# Patient Record
Sex: MALE | Race: WHITE | NOT HISPANIC OR LATINO | Employment: FULL TIME | URBAN - METROPOLITAN AREA
[De-identification: names, ages, dates, MRNs, and addresses within clinical notes are randomized per-mention and may not be internally consistent; named-entity substitution may affect disease eponyms.]

---

## 2018-02-13 ENCOUNTER — LAB (OUTPATIENT)
Dept: LAB | Facility: HOSPITAL | Age: 38
End: 2018-02-13

## 2018-02-13 DIAGNOSIS — E87.0 TYPE I DIABETES MELLITUS WITH HYPEROSMOLAR COMA (HCC): Primary | ICD-10-CM

## 2018-02-13 DIAGNOSIS — E10.69 TYPE I DIABETES MELLITUS WITH HYPEROSMOLAR COMA (HCC): Primary | ICD-10-CM

## 2018-02-13 DIAGNOSIS — E10.65 TYPE I DIABETES MELLITUS WITH HYPEROSMOLAR COMA (HCC): Primary | ICD-10-CM

## 2018-02-13 PROCEDURE — 36415 COLL VENOUS BLD VENIPUNCTURE: CPT

## 2018-02-13 PROCEDURE — 86341 ISLET CELL ANTIBODY: CPT

## 2018-02-14 LAB — PANC ISLET CELL AB TITR SER: NEGATIVE {TITER}

## 2018-02-15 LAB — GAD65 AB SER-ACNC: <5 U/ML (ref 0–5)

## 2020-12-07 ENCOUNTER — OFFICE VISIT (OUTPATIENT)
Dept: ENDOCRINOLOGY | Facility: CLINIC | Age: 40
End: 2020-12-07

## 2020-12-07 VITALS — WEIGHT: 210 LBS | BODY MASS INDEX: 26.11 KG/M2 | HEIGHT: 75 IN

## 2020-12-07 DIAGNOSIS — E78.00 HYPERCHOLESTEROLEMIA: ICD-10-CM

## 2020-12-07 DIAGNOSIS — E11.9 TYPE 2 DIABETES MELLITUS WITHOUT COMPLICATION, WITHOUT LONG-TERM CURRENT USE OF INSULIN (HCC): Primary | ICD-10-CM

## 2020-12-07 PROCEDURE — 99442 PR PHYS/QHP TELEPHONE EVALUATION 11-20 MIN: CPT | Performed by: PHYSICIAN ASSISTANT

## 2020-12-07 RX ORDER — METFORMIN HYDROCHLORIDE 500 MG/1
1000 TABLET, EXTENDED RELEASE ORAL DAILY
Qty: 60 TABLET | Refills: 5 | Status: SHIPPED | OUTPATIENT
Start: 2020-12-07 | End: 2020-12-29

## 2020-12-07 NOTE — PROGRESS NOTES
"You have chosen to receive care through a telephone visit. Do you consent to use a telephone visit for your medical care today?   Yes         Office Note      Date: 2020  Patient Name: David Sanchez  MRN: [id number]  : 1980    Chief Complaint   Patient presents with   • Follow-up   • Diabetes       History of Present Illness:   David Sanchez is a 40 y.o. male who presents today for type 2 diabetes.  He remains on metformin ER.  He is testing FSBS several times per week.  Most readings are around 130 or less.  He admits that his diet has not been good.  He asks about Ozempic.  [place]  He denies any problems with his feet.  Eye exam up-to-date.    Subjective      Review of Systems:   Review of Systems   Constitutional: Negative for appetite change, chills, fatigue, fever and unexpected weight change.   Respiratory: Negative for cough, shortness of breath and wheezing.    Cardiovascular: Negative for chest pain, palpitations and leg swelling.   Gastrointestinal: Negative for abdominal pain, constipation, diarrhea, nausea and vomiting.   Endocrine: Negative for cold intolerance, heat intolerance, polydipsia, polyphagia and polyuria.   Neurological: Negative for tremors, syncope, weakness, numbness and headaches.       The following portions of the patient's history were reviewed and updated as appropriate: allergies, current medications, past family history, past medical history, past social history, past surgical history and problem list.    Objective     Vitals:    20 1338   Weight: 95.3 kg (210 lb)   Height: 190.5 cm (75\")     Body mass index is 26.25 kg/m².    Physical Exam    Current Outpatient Medications   Medication Instructions   • glucose blood test strip Use to test blood sugar twice daily   • Lancets (ONETOUCH DELICA PLUS [id number]) misc Use to test blood sugar twice daily   • metFORMIN ER (GLUCOPHAGE-XR) 1,000 mg, " Oral, Daily       Assessment / Plan      Assessment & Plan:  1. Type 2 diabetes mellitus without complication, without long-term current use of insulin (CMS/[facility])  FSBS okay.  Discussed treatment options.  Continue metformin.  Encouraged to work on healthy dietary choices and regular exercise.  Lab order mailed for A1c.  - Hemoglobin A1c; Future      11 minutes spent on phone with patient.    Return in about 4 months (around 4/7/2021).     QI Ward  12/07/2020

## 2020-12-21 ENCOUNTER — TELEPHONE (OUTPATIENT)
Dept: ENDOCRINOLOGY | Facility: CLINIC | Age: 40
End: 2020-12-21

## 2020-12-29 ENCOUNTER — TELEPHONE (OUTPATIENT)
Dept: ENDOCRINOLOGY | Facility: CLINIC | Age: 40
End: 2020-12-29

## 2020-12-29 DIAGNOSIS — E11.65 TYPE 2 DIABETES MELLITUS WITH HYPERGLYCEMIA, WITHOUT LONG-TERM CURRENT USE OF INSULIN (HCC): Primary | ICD-10-CM

## 2020-12-29 RX ORDER — METFORMIN HYDROCHLORIDE 500 MG/1
2000 TABLET, EXTENDED RELEASE ORAL DAILY
Qty: 120 TABLET | Refills: 3 | Status: SHIPPED | OUTPATIENT
Start: 2020-12-29 | End: 2021-02-23 | Stop reason: SDUPTHER

## 2020-12-29 NOTE — TELEPHONE ENCOUNTER
I received copy of his A1c from last week - 7.9%.  I recommend increasing metformin ER to 4 pills/day (this is the most effective dose) in addition to working on his diet.  I sent in new Rx to his pharmacy.  Please let him know.  Thank you.

## 2021-02-12 ENCOUNTER — IMMUNIZATION (OUTPATIENT)
Dept: VACCINE CLINIC | Facility: HOSPITAL | Age: 41
End: 2021-02-12

## 2021-02-12 PROCEDURE — 0001A: CPT | Performed by: INTERNAL MEDICINE

## 2021-02-12 PROCEDURE — 91300 HC SARSCOV02 VAC 30MCG/0.3ML IM: CPT | Performed by: INTERNAL MEDICINE

## 2021-02-23 DIAGNOSIS — E11.65 TYPE 2 DIABETES MELLITUS WITH HYPERGLYCEMIA, WITHOUT LONG-TERM CURRENT USE OF INSULIN (HCC): ICD-10-CM

## 2021-02-24 RX ORDER — METFORMIN HYDROCHLORIDE 500 MG/1
2000 TABLET, EXTENDED RELEASE ORAL DAILY
Qty: 120 TABLET | Refills: 3 | Status: SHIPPED | OUTPATIENT
Start: 2021-02-24 | End: 2021-07-09 | Stop reason: SDUPTHER

## 2021-03-05 ENCOUNTER — IMMUNIZATION (OUTPATIENT)
Dept: VACCINE CLINIC | Facility: HOSPITAL | Age: 41
End: 2021-03-05

## 2021-03-05 ENCOUNTER — APPOINTMENT (OUTPATIENT)
Dept: VACCINE CLINIC | Facility: HOSPITAL | Age: 41
End: 2021-03-05

## 2021-03-05 PROCEDURE — 0002A: CPT | Performed by: INTERNAL MEDICINE

## 2021-03-05 PROCEDURE — 91300 HC SARSCOV02 VAC 30MCG/0.3ML IM: CPT | Performed by: INTERNAL MEDICINE

## 2021-06-08 ENCOUNTER — TELEPHONE (OUTPATIENT)
Dept: ENDOCRINOLOGY | Facility: CLINIC | Age: 41
End: 2021-06-08

## 2021-06-08 DIAGNOSIS — E11.65 TYPE 2 DIABETES MELLITUS WITH HYPERGLYCEMIA, WITHOUT LONG-TERM CURRENT USE OF INSULIN (HCC): Primary | ICD-10-CM

## 2021-06-08 DIAGNOSIS — E78.00 HYPERCHOLESTEROLEMIA: ICD-10-CM

## 2021-06-08 NOTE — TELEPHONE ENCOUNTER
Is he coming here for labs, going to a [facility] [place], operated by [facility] lab, or does he need them mailed to him?

## 2021-06-08 NOTE — TELEPHONE ENCOUNTER
PATIENT WANTS ORDERS IN CHART FOR ALL BLOOD WORK NOT JUST A1C. HE WANTS TO HAVE THEM DRAWN BEFORE NEXT APPOINTMENT.

## 2021-07-09 DIAGNOSIS — E11.65 TYPE 2 DIABETES MELLITUS WITH HYPERGLYCEMIA, WITHOUT LONG-TERM CURRENT USE OF INSULIN (HCC): ICD-10-CM

## 2021-07-09 RX ORDER — METFORMIN HYDROCHLORIDE 500 MG/1
2000 TABLET, EXTENDED RELEASE ORAL DAILY
Qty: 120 TABLET | Refills: 5 | Status: SHIPPED | OUTPATIENT
Start: 2021-07-09 | End: 2021-09-10 | Stop reason: SDUPTHER

## 2021-07-09 RX ORDER — LANCETS 33 GAUGE
EACH MISCELLANEOUS 2 TIMES DAILY
Qty: 100 EACH | Refills: 5 | Status: SHIPPED | OUTPATIENT
Start: 2021-07-09

## 2021-08-05 DIAGNOSIS — E78.00 HYPERCHOLESTEROLEMIA: ICD-10-CM

## 2021-08-05 DIAGNOSIS — E11.65 TYPE 2 DIABETES MELLITUS WITH HYPERGLYCEMIA, WITHOUT LONG-TERM CURRENT USE OF INSULIN (HCC): ICD-10-CM

## 2021-09-10 ENCOUNTER — OFFICE VISIT (OUTPATIENT)
Dept: ENDOCRINOLOGY | Facility: CLINIC | Age: 41
End: 2021-09-10

## 2021-09-10 VITALS
HEIGHT: 75 IN | BODY MASS INDEX: 25.61 KG/M2 | DIASTOLIC BLOOD PRESSURE: 80 MMHG | OXYGEN SATURATION: 98 % | HEART RATE: 77 BPM | SYSTOLIC BLOOD PRESSURE: 120 MMHG | WEIGHT: 206 LBS

## 2021-09-10 DIAGNOSIS — E11.9 TYPE 2 DIABETES MELLITUS WITHOUT COMPLICATION, WITHOUT LONG-TERM CURRENT USE OF INSULIN (HCC): Primary | Chronic | ICD-10-CM

## 2021-09-10 DIAGNOSIS — E78.00 HYPERCHOLESTEROLEMIA: Chronic | ICD-10-CM

## 2021-09-10 PROCEDURE — 99214 OFFICE O/P EST MOD 30 MIN: CPT | Performed by: PHYSICIAN ASSISTANT

## 2021-09-10 RX ORDER — BLOOD SUGAR DIAGNOSTIC
STRIP MISCELLANEOUS
Qty: 100 EACH | Refills: 3 | Status: SHIPPED | OUTPATIENT
Start: 2021-09-10

## 2021-09-10 RX ORDER — METFORMIN HYDROCHLORIDE 500 MG/1
2000 TABLET, EXTENDED RELEASE ORAL DAILY
Qty: 180 TABLET | Refills: 1 | Status: SHIPPED | OUTPATIENT
Start: 2021-09-10 | End: 2021-12-17 | Stop reason: SDUPTHER

## 2021-09-10 RX ORDER — ROSUVASTATIN CALCIUM 10 MG/1
10 TABLET, COATED ORAL DAILY
Qty: 90 TABLET | Refills: 1 | Status: SHIPPED | OUTPATIENT
Start: 2021-09-10 | End: 2021-12-17

## 2021-09-10 NOTE — PROGRESS NOTES
"     Office Note      Date: 09/10/2021  Patient Name: David Sanchez  MRN: [id number]  : 1980    Chief Complaint   Patient presents with   • Diabetes       History of Present Illness:   David Sanchez is a 40 y.o. male who presents today for follow up on type 2 diabetes, diagnosed in 2017.  Known diabetic complications: none.  Current diabetic medications include metformin.  He does miss pills some days.  Past diabetic medications include none.  Current monitoring regimen: none recently.  Home blood sugar records: n/a  Any episodes of hypoglycemia? no  Feet: No sores or other issues.   Eye exam current (within one year): yes, 2020. No retinopathy with macular edema.  Current diet: Eating fast food regularly, diet low in vegetables and fruit.  Current exercise: active at work, no dedicated exercise otherwise.  ACE inhibitor/ARB: No.  Statin: No.  He reports taking a atorvastatin in the past.  He stopped taking this, but cannot remember why.  He thinks that he was having a side effect though.    Subjective      Review of Systems:   Review of Systems   Constitutional: Negative.    Cardiovascular: Negative.    Gastrointestinal: Negative.    Endocrine: Negative.        The following portions of the patient's history were reviewed and updated as appropriate: allergies, current medications, past family history, past medical history, past social history, past surgical history and problem list.    Objective     Vitals:    09/10/21 1032   BP: 120/80   Pulse: 77   SpO2: 98%   Weight: 93.4 kg (206 lb)   Height: 190.5 cm (75\")   PainSc: 0-No pain     Body mass index is 25.75 kg/m².    Physical Exam  Vitals reviewed.   Constitutional:       General: He is not in acute distress.  Cardiovascular:      Pulses:           Dorsalis pedis pulses are 2+ on the right side and 2+ on the left side.        Posterior tibial pulses are 2+ on the right side and 2+ on the left side.   Musculoskeletal:      Right foot: No deformity.    "   Left foot: No deformity.   Feet:      Right foot:      Protective Sensation: 10 sites tested. 10 sites sensed.      Skin integrity: Skin integrity normal.      Toenail Condition: Right toenails are normal.      Left foot:      Protective Sensation: 10 sites tested. 10 sites sensed.      Skin integrity: Skin integrity normal.      Toenail Condition: Left toenails are normal.      Comments:   Neurological:      Mental Status: He is alert and oriented to person, place, and time.   Psychiatric:         Mood and Affect: Affect normal.       Labs on 7/29/2021  A1c 6.6%  Urine albumin/creatinine ratio 6  CMP - glucose 130, creatinine 0.97, eGFR 97, potassium 5.0, AST 35, ALT 69  Lipids - , , HDL 45, triglycerides 112  TSH 2.79    Current Outpatient Medications   Medication Instructions   • Lancets (OneTouch Delica Plus [id number]) misc Use to test blood sugar twice daily   • metFORMIN ER (GLUCOPHAGE-XR) 2,000 mg, Oral, Daily   • OneTouch Verio test strip Use to test blood sugar once daily   • rosuvastatin (CRESTOR) 10 mg, Oral, Daily       Assessment / Plan      Assessment & Plan:  1. Type 2 diabetes mellitus without complication, without long-term current use of insulin (CMS/[facility])  A1c at goal.  His diet is unhealthy.  Encouraged to limit fast food, increase vegetables and fruits, whole grains, lean protein.  ALT is mildly elevated.  He is often drinking 5-6 light beers on Saturdays.  He does not typically drink alcohol on any other days.  Encouraged to reduce to 2 drinks or less.  - metFORMIN ER (GLUCOPHAGE-XR) 500 MG 24 hr tablet; Take 4 tablets by mouth Daily.  Dispense: 180 tablet; Refill: 1  - Lipid Panel; Future  - Comprehensive Metabolic Panel; Future  - Hemoglobin A1c; Future    2. Hypercholesterolemia  Encouraged nutritious dietary choices and regular exercise.  Add rosuvastatin.  - rosuvastatin (Crestor) 10 MG tablet; Take 1 tablet by mouth Daily.  Dispense: 90 tablet; Refill: 1  - Lipid Panel;  Future    Encouraged to call with any questions or concerns before next visit.  Return in about 3 months (around 12/10/2021) for next scheduled follow up. He will have A1c, lipids, CMP prior to visit.  Lab order provided today.    QI Ward  Endocrinology  09/10/2021

## 2021-09-15 ENCOUNTER — TELEPHONE (OUTPATIENT)
Dept: ENDOCRINOLOGY | Facility: CLINIC | Age: 41
End: 2021-09-15

## 2021-09-15 NOTE — TELEPHONE ENCOUNTER
PT CALLED REQUESTING A CONSULT ON HIS MEDICATION HE STARTED ON Friday. HE STATED HIS NECK HAS BEEN STIFF SENSE HE STARTED IT. PLEASE REACH OUT TO PT AT EARLIEST CONVENIENCE. THANK YOU

## 2021-09-15 NOTE — TELEPHONE ENCOUNTER
It is not likely related, but have him stop the rosuvastatin.  He can try restarting again in about 2 weeks.  If symptoms recur, then stop and let me know.  Thank you.

## 2021-12-02 ENCOUNTER — TELEPHONE (OUTPATIENT)
Dept: ENDOCRINOLOGY | Facility: CLINIC | Age: 41
End: 2021-12-02

## 2021-12-02 NOTE — TELEPHONE ENCOUNTER
Pt called stated his blood pressure has been running anywhere from 130/100 to 155/100. Pt wanted to know if Tammy would call in Blood pressure medicine or if pt needs to wait until his appt on 12/17/21. Please notify pt

## 2021-12-03 NOTE — TELEPHONE ENCOUNTER
Spoke with patient.  BP this morning 130/90.  More often 130-150/100.  He reports resting heart rate upper 80s-120.  He reports that his resting heart rate has been in the 90s/low 100s for many years.  He reports being on blood pressure a few years back, but this was stopped and blood pressure remained okay.  He does not know the name of the medication.  He is not established with a PCP at this time.  Started paying attention to elevated resting heart rate after getting an apple watch about a month ago.  He is asymptomatic.  He denies any new medications.  No recent illness.  He reports that he vapes occasionally, no other nicotine use.  Encouraged him to significantly reduce fast food and decrease sodium in diet.  He has an appointment in 2 weeks.  He has lab orders to be done prior to that visit and may have this done on Saturday.  He will continue to monitor BP and call with readings early next week.

## 2021-12-17 ENCOUNTER — OFFICE VISIT (OUTPATIENT)
Dept: ENDOCRINOLOGY | Facility: CLINIC | Age: 41
End: 2021-12-17

## 2021-12-17 VITALS
DIASTOLIC BLOOD PRESSURE: 79 MMHG | BODY MASS INDEX: 26.11 KG/M2 | HEIGHT: 75 IN | HEART RATE: 95 BPM | WEIGHT: 210 LBS | OXYGEN SATURATION: 97 % | SYSTOLIC BLOOD PRESSURE: 118 MMHG

## 2021-12-17 DIAGNOSIS — E11.65 TYPE 2 DIABETES MELLITUS WITH HYPERGLYCEMIA, WITHOUT LONG-TERM CURRENT USE OF INSULIN: Primary | Chronic | ICD-10-CM

## 2021-12-17 DIAGNOSIS — E78.00 HYPERCHOLESTEROLEMIA: Chronic | ICD-10-CM

## 2021-12-17 DIAGNOSIS — R03.0 ELEVATED BLOOD PRESSURE READING WITHOUT DIAGNOSIS OF HYPERTENSION: ICD-10-CM

## 2021-12-17 PROBLEM — E11.9 TYPE 2 DIABETES MELLITUS (HCC): Chronic | Status: ACTIVE | Noted: 2017-01-01

## 2021-12-17 LAB
EXPIRATION DATE: ABNORMAL
GLUCOSE BLDC GLUCOMTR-MCNC: 161 MG/DL (ref 70–130)
Lab: ABNORMAL

## 2021-12-17 PROCEDURE — 99214 OFFICE O/P EST MOD 30 MIN: CPT | Performed by: PHYSICIAN ASSISTANT

## 2021-12-17 PROCEDURE — 82947 ASSAY GLUCOSE BLOOD QUANT: CPT | Performed by: PHYSICIAN ASSISTANT

## 2021-12-17 RX ORDER — METFORMIN HYDROCHLORIDE 500 MG/1
2000 TABLET, EXTENDED RELEASE ORAL DAILY
Qty: 180 TABLET | Refills: 2 | Status: SHIPPED | OUTPATIENT
Start: 2021-12-17 | End: 2022-03-18 | Stop reason: SDUPTHER

## 2021-12-17 RX ORDER — IBUPROFEN 800 MG/1
800 TABLET ORAL EVERY 6 HOURS PRN
Qty: 20 TABLET | Refills: 0 | Status: CANCELLED | OUTPATIENT
Start: 2021-12-17

## 2022-01-03 DIAGNOSIS — E11.9 TYPE 2 DIABETES MELLITUS WITHOUT COMPLICATION, WITHOUT LONG-TERM CURRENT USE OF INSULIN: Chronic | ICD-10-CM

## 2022-01-03 DIAGNOSIS — E78.00 HYPERCHOLESTEROLEMIA: Chronic | ICD-10-CM

## 2022-01-14 RX ORDER — IBUPROFEN 800 MG/1
800 TABLET ORAL EVERY 6 HOURS PRN
Qty: 20 TABLET | Refills: 0 | Status: SHIPPED | OUTPATIENT
Start: 2022-01-14 | End: 2022-03-18 | Stop reason: SDUPTHER

## 2022-01-14 NOTE — TELEPHONE ENCOUNTER
Spoke with patient.  States he thought you had filled it in the past.  States he takes it for his back and arms every once in a while.  Asking if you would refill.

## 2022-01-17 ENCOUNTER — IMMUNIZATION (OUTPATIENT)
Dept: VACCINE CLINIC | Facility: HOSPITAL | Age: 42
End: 2022-01-17

## 2022-01-17 PROCEDURE — 0004A HC ADM SARSCOV2 30MCG/0.3ML BOOSTER: CPT | Performed by: INTERNAL MEDICINE

## 2022-01-17 PROCEDURE — 91300 HC SARSCOV02 VAC 30MCG/0.3ML IM: CPT | Performed by: INTERNAL MEDICINE

## 2022-03-18 ENCOUNTER — OFFICE VISIT (OUTPATIENT)
Dept: ENDOCRINOLOGY | Facility: CLINIC | Age: 42
End: 2022-03-18

## 2022-03-18 ENCOUNTER — SPECIALTY PHARMACY (OUTPATIENT)
Dept: PHARMACY | Facility: HOSPITAL | Age: 42
End: 2022-03-18

## 2022-03-18 VITALS
WEIGHT: 206 LBS | SYSTOLIC BLOOD PRESSURE: 141 MMHG | DIASTOLIC BLOOD PRESSURE: 89 MMHG | BODY MASS INDEX: 25.61 KG/M2 | OXYGEN SATURATION: 96 % | TEMPERATURE: 98.4 F | HEART RATE: 96 BPM | HEIGHT: 75 IN

## 2022-03-18 DIAGNOSIS — E11.65 TYPE 2 DIABETES MELLITUS WITH HYPERGLYCEMIA, WITHOUT LONG-TERM CURRENT USE OF INSULIN: Chronic | ICD-10-CM

## 2022-03-18 DIAGNOSIS — E11.65 TYPE 2 DIABETES MELLITUS WITH HYPERGLYCEMIA, WITHOUT LONG-TERM CURRENT USE OF INSULIN: Primary | Chronic | ICD-10-CM

## 2022-03-18 LAB
EXPIRATION DATE: ABNORMAL
GLUCOSE BLDC GLUCOMTR-MCNC: 135 MG/DL (ref 70–130)
HBA1C MFR BLD: 7.1 %
Lab: ABNORMAL

## 2022-03-18 PROCEDURE — 82962 GLUCOSE BLOOD TEST: CPT | Performed by: NURSE PRACTITIONER

## 2022-03-18 PROCEDURE — 99213 OFFICE O/P EST LOW 20 MIN: CPT | Performed by: NURSE PRACTITIONER

## 2022-03-18 PROCEDURE — 83036 HEMOGLOBIN GLYCOSYLATED A1C: CPT | Performed by: NURSE PRACTITIONER

## 2022-03-18 RX ORDER — IBUPROFEN 800 MG/1
800 TABLET ORAL EVERY 8 HOURS PRN
Qty: 20 TABLET | Refills: 1 | Status: SHIPPED | OUTPATIENT
Start: 2022-03-18 | End: 2022-12-02 | Stop reason: SDUPTHER

## 2022-03-18 RX ORDER — METFORMIN HYDROCHLORIDE 500 MG/1
1000 TABLET, EXTENDED RELEASE ORAL 2 TIMES DAILY
Qty: 120 TABLET | Refills: 5 | Status: SHIPPED | OUTPATIENT
Start: 2022-03-18 | End: 2022-09-22 | Stop reason: SDUPTHER

## 2022-03-18 NOTE — PROGRESS NOTES
Specialty Pharmacy Patient Management Program  Endocrinology Initial Assessment       David Sanchez is a 41 y.o. male with Type 2 Diabetes seen by an Endocrinology provider and enrolled in the Endocrinology Patient Management program offered by [facility].  An initial outreach was conducted, including assessment of therapy appropriateness and specialty medication education for Metformin. The patient was introduced to services offered by [facility], including: regular assessments, refill coordination, curbside pick-up or mail order delivery options, prior authorization maintenance, and financial assistance programs as applicable. The patient was also provided with contact information for the pharmacy team.     Patient is taking metformin 500 mg ER 2,000 mg daily to control BG. He checks his sugar occasionally. He reports no low BG <70. Metformin is the only medication he has tried. He reports occasional stomach upset, but nothing that is affecting QOL.     Insurance Coverage & Financial Support  [facility] employee insurance    Relevant Past Medical History and Comorbidities  Relevant medical history and concomitant health conditions were discussed with the patient. The patient's chart has been reviewed for relevant past medical history and comorbid health conditions and updated as necessary.   Past Medical History:   Diagnosis Date   • Disorder associated with type 2 diabetes mellitus (HCC)    • Hypercholesterolemia    • Type 2 diabetes mellitus (HCC) 2017     Social History     Socioeconomic History   • Marital status:    Tobacco Use   • Smoking status: Former Smoker     Types: Cigarettes     Quit date:      Years since quittin.2   • Smokeless tobacco: Never Used   Vaping Use   • Vaping Use: Former   Substance and Sexual Activity   • Alcohol use: Yes     Comment: occ   • Drug use: Never   • Sexual activity: Defer       Allergies  Known  allergies and reactions were discussed with the patient. The patient's chart has been reviewed for  allergy information and updated as necessary.   Patient has no known allergies.    Current Medication List  This medication list has been reviewed with the patient and evaluated for any interactions or necessary modifications/recommendations, and updated to include all prescription medications, OTC medications, and supplements the patient is currently taking.  This list reflects what is contained in the patient's profile, which has also been marked as reviewed to communicate to other providers it is the most up to date version of the patient's current medication therapy.     Current Outpatient Medications:   •  ibuprofen (ADVIL,MOTRIN) 800 MG tablet, Take 1 tablet by mouth Every 8 (Eight) Hours As Needed (pain)., Disp: 20 tablet, Rfl: 1  •  Lancets (OneTouch Delica Plus [id number]) misc, Use to test blood sugar twice daily, Disp: 100 each, Rfl: 5  •  metFORMIN ER (GLUCOPHAGE-XR) 500 MG 24 hr tablet, Take 2 tablets by mouth 2 (Two) Times a Day for 30 days., Disp: 120 tablet, Rfl: 5  •  OneTouch Verio test strip, Use to test blood sugar once daily (Patient taking differently: Use to test blood sugar once daily), Disp: 100 each, Rfl: 3    Drug Interactions  Coadministration of metformin and NSAIDs may increase the risk of acute renal failure and lactic acidosis - we will continue to monitor kidney function (he only uses IBU PRN)    Recommended Medications Assessment  • Aspirin - not indicated  • Statin -  not taking (he was on before but medication was stopped as PCP thought it was increasing his BG)  • ACEi/ARB - not taking      Relevant Laboratory Values  A1C Last 3 Results    HGBA1C Last 3 Results 3/18/22   Hemoglobin A1C 7.1           Lab Results   Component Value Date    HGBA1C 7.1 03/18/2022     No results found for: GLUCOSE, CALCIUM, NA, K, CO2, CL, BUN, CREATININE, EGFRIFAFRI, EGFRIFNONA, BCR, ANIONGAP  No results  found for: CHOL, CHLPL, TRIG, HDL, LDL, LDLDIRECT      Initial Education Provided for Specialty Medication  None    Adherence and Self-Administration  • Barriers to Patient Adherence and/or Self-Administration: None  • Methods for Supporting Patient Adherence and/or Self-Administration: None required       Vaccination Status:   COVID 19: UTD (boosted)  Influenza: UTD  Pneumococcal: not vaccinated  Hep B: not vaccinated      Goals of Therapy  • Patient Goals of Therapy: Take medication everyday and monitor blood sugar more frequently  • Clinical Goals or Therapeutic Targets, If Applicable: A1C reduction <7%      Reassessment Plan & Follow-Up  1. Pharmacist to perform regular reassessments no more than (6) months from the previous assessment.  2. Welcome information and patient satisfaction survey to be sent by retail team with patient's initial fill.  3. Care Coordinator to set up future refill outreaches, coordinate prescription delivery, and escalate clinical questions to pharmacist.     Additional Plans, Therapy Recommendations or Therapy Problems to Be Addressed: Patient's diabetes is improving with current treatment - A1C: 7.1% at today's visit. No pharmacological recommendations at this time. He needs vaccinations - recommended to patient, reassess at next visit.   Recent Provider Changes:  None    Attestation  I attest that the initiated specialty medication(s) are appropriate for the patient based on my assessment.  If the prescribed therapy is at any point deemed not appropriate based on the current or future assessments, a consultation will be initiated with the patient's specialty care provider to determine the best course of action. The revised plan of therapy will be documented along with any additional patient education provided.       Breanna Ramirez, PharmD  3/18/2022  11:55 EDT

## 2022-03-18 NOTE — PROGRESS NOTES
"Chief Complaint   Patient presents with   • Diabetes     Follow up          HPI   David Sanchez is a 41 y.o. male had concerns including Diabetes (Follow up).      He is checking blood sugars rarely times per day.  Current medications for diabetes include Metformin 1,000 mg BID.  Most recent optho exam: 1 year ago  Place of optho exam: [facility]    The following portions of the patient's history were reviewed and updated as appropriate: allergies, current medications, past family history, past medical history, past social history, past surgical history and problem list.      Review of Systems   Constitutional: Positive for fatigue.   Eyes: Negative.    Endocrine: Negative for polydipsia, polyphagia and polyuria.   Musculoskeletal: Positive for arthralgias.   Psychiatric/Behavioral: Negative for sleep disturbance.   All other systems reviewed and are negative.       Physical Exam  Vitals reviewed.   Constitutional:       Appearance: Normal appearance.   Eyes:      Extraocular Movements: Extraocular movements intact.   Pulmonary:      Effort: Pulmonary effort is normal.   Neurological:      Mental Status: He is alert and oriented to person, place, and time.   Psychiatric:         Mood and Affect: Mood normal.         Behavior: Behavior normal.         Thought Content: Thought content normal.         Judgment: Judgment normal.        /89 (BP Location: Left arm, Patient Position: Sitting, Cuff Size: Adult)   Pulse 96   Temp 98.4 °F (36.9 °C) (Infrared)   Ht 190.5 cm (75\")   Wt 93.4 kg (206 lb)   SpO2 96%   BMI 25.75 kg/m²      Labs and imaging    CMP:     Lipid Panel:  No results found for: CHOL, TRIG, HDL, VLDL, LDL  HbA1c:  Lab Results   Component Value Date    HGBA1C 7.1 03/18/2022     Glucose:    Lab Results   Component Value Date    POCGLU 135 (A) 03/18/2022     Microalbumin:  No results found for: MALBCRERATIO  TSH:  No results found for: TSH    Assessment and plan  Diagnoses and all orders for " this visit:    1. Type 2 diabetes mellitus with hyperglycemia, without long-term current use of insulin (HCC) (Primary)  Assessment & Plan:  Diabetes is improving with treatment.   Continue current treatment regimen.  Diabetes will be reassessed in 3 months.    Orders:  -     POC Glucose Fingerstick  -     POC Glycosylated Hemoglobin (Hb A1C)       Return in about 3 months (around 6/18/2022) for Follow-up appointment, A1C. The patient was instructed to contact the clinic with any interval questions or concerns.    AKIL Byrne   Endocrinology

## 2022-04-13 ENCOUNTER — SPECIALTY PHARMACY (OUTPATIENT)
Dept: PHARMACY | Facility: HOSPITAL | Age: 42
End: 2022-04-13

## 2022-05-06 ENCOUNTER — SPECIALTY PHARMACY (OUTPATIENT)
Dept: PHARMACY | Facility: HOSPITAL | Age: 42
End: 2022-05-06

## 2022-06-14 ENCOUNTER — SPECIALTY PHARMACY (OUTPATIENT)
Dept: PHARMACY | Facility: HOSPITAL | Age: 42
End: 2022-06-14

## 2022-07-06 ENCOUNTER — SPECIALTY PHARMACY (OUTPATIENT)
Dept: PHARMACY | Facility: HOSPITAL | Age: 42
End: 2022-07-06

## 2022-07-08 ENCOUNTER — LAB (OUTPATIENT)
Dept: LAB | Facility: HOSPITAL | Age: 42
End: 2022-07-08

## 2022-07-08 ENCOUNTER — OFFICE VISIT (OUTPATIENT)
Dept: ENDOCRINOLOGY | Facility: CLINIC | Age: 42
End: 2022-07-08

## 2022-07-08 VITALS
OXYGEN SATURATION: 98 % | HEART RATE: 89 BPM | DIASTOLIC BLOOD PRESSURE: 84 MMHG | BODY MASS INDEX: 24.94 KG/M2 | HEIGHT: 75 IN | SYSTOLIC BLOOD PRESSURE: 146 MMHG | WEIGHT: 200.6 LBS

## 2022-07-08 DIAGNOSIS — E11.65 TYPE 2 DIABETES MELLITUS WITH HYPERGLYCEMIA, WITHOUT LONG-TERM CURRENT USE OF INSULIN: Primary | ICD-10-CM

## 2022-07-08 DIAGNOSIS — I10 ESSENTIAL HYPERTENSION: ICD-10-CM

## 2022-07-08 LAB
EXPIRATION DATE: NORMAL
GLUCOSE BLDC GLUCOMTR-MCNC: 160 MG/DL (ref 70–130)
HBA1C MFR BLD: 7.5 %
Lab: NORMAL

## 2022-07-08 PROCEDURE — 83036 HEMOGLOBIN GLYCOSYLATED A1C: CPT | Performed by: NURSE PRACTITIONER

## 2022-07-08 PROCEDURE — 99214 OFFICE O/P EST MOD 30 MIN: CPT | Performed by: NURSE PRACTITIONER

## 2022-07-08 PROCEDURE — 82962 GLUCOSE BLOOD TEST: CPT | Performed by: NURSE PRACTITIONER

## 2022-07-08 RX ORDER — LISINOPRIL 5 MG/1
5 TABLET ORAL DAILY
Qty: 30 TABLET | Refills: 2 | Status: SHIPPED | OUTPATIENT
Start: 2022-07-08 | End: 2022-09-22 | Stop reason: SDUPTHER

## 2022-07-08 RX ORDER — DAPAGLIFLOZIN 5 MG/1
5 TABLET, FILM COATED ORAL DAILY
Qty: 30 TABLET | Refills: 2 | Status: SHIPPED | OUTPATIENT
Start: 2022-07-08 | End: 2022-09-22 | Stop reason: SDUPTHER

## 2022-07-08 NOTE — ASSESSMENT & PLAN NOTE
Hypertension is newly identified.  Start lisinopril 5 mg daily  Blood pressure will be reassessed at the next regular appointment.

## 2022-07-08 NOTE — PROGRESS NOTES
Chief Complaint   Patient presents with   • Diabetes        David Sanchez is a 41 y.o. male had concerns including Diabetes.      He is checking blood sugars rarely.  Current medications for diabetes include Metformin 1,000 mg BID.  Most recent optho exam: is upcoming  Place of optho exam: [facility]  Hypos: none  He has not been following any dietary/exercise guidelines.  He does not plan to change this at this time.    The following portions of the patient's history were reviewed and updated as appropriate: allergies, current medications, past family history, past medical history, past social history, past surgical history and problem list.      Review of Systems   Constitutional: Positive for fatigue.   Eyes: Negative.    Endocrine: Negative for polydipsia, polyphagia and polyuria.   Musculoskeletal: Positive for arthralgias.   Psychiatric/Behavioral: Negative for sleep disturbance.   All other systems reviewed and are negative.       Physical Exam  Vitals reviewed.   Constitutional:       Appearance: Normal appearance.   Eyes:      Extraocular Movements: Extraocular movements intact.   Cardiovascular:      Rate and Rhythm: Normal rate.      Pulses: Normal pulses.           Dorsalis pedis pulses are 2+ on the right side and 2+ on the left side.        Posterior tibial pulses are 2+ on the right side.   Pulmonary:      Effort: Pulmonary effort is normal.   Feet:      Right foot:      Protective Sensation: 10 sites tested. 10 sites sensed.      Skin integrity: Skin integrity normal.      Toenail Condition: Right toenails are normal.      Left foot:      Protective Sensation: 10 sites tested. 10 sites sensed.      Skin integrity: Skin integrity normal.      Toenail Condition: Left toenails are normal.      Comments: Diabetic Foot Exam Performed and Monofilament Test Performed  Neurological:      Mental Status: He is alert and oriented to person, place, and time.   Psychiatric:         Mood and Affect: Mood  "normal.         Behavior: Behavior normal.         Thought Content: Thought content normal.         Judgment: Judgment normal.        /84 (BP Location: Left arm, Patient Position: Sitting, Cuff Size: Adult)   Pulse 89   Ht 190.5 cm (75\")   Wt 91 kg (200 lb 9.6 oz)   SpO2 98%   BMI 25.07 kg/m²      Labs and imaging    CMP:     Lipid Panel:  No results found for: CHOL, TRIG, HDL, VLDL, LDL  HbA1c:  Lab Results   Component Value Date    HGBA1C 7.5 07/08/2022    HGBA1C 7.1 03/18/2022     Glucose:    Lab Results   Component Value Date    POCGLU 160 (A) 07/08/2022     Microalbumin:  No results found for: MALBCRERATIO  TSH:  No results found for: TSH    Assessment and plan  Diagnoses and all orders for this visit:    1. Type 2 diabetes mellitus with hyperglycemia, without long-term current use of insulin (HCC) (Primary)  Assessment & Plan:  - Diabetes is above goal with A1c 7.5.  -Discussed dietary and exercise guidelines.  -Discussed importance of yearly eye exams.  -Discussed importance of maintaining optimal blood sugars.  -Continue metformin 1000 mg twice daily.  -Start Farxiga 5 mg daily. Discussed the medication's MOA and that it may cause more frequent urination particularly upon starting the medication. Take one tablet in the morning with or without food. Encouraged to drink plenty of water as to not get dehydrated especially in warmer weather or with activity. Also discussed there may be an increased incidence of UTIs or yeast infections and to monitor for signs/symptoms.  -Signs and symptoms of hypoglycemia reviewed with rule 15's advised.  -Fasting labs today, will follow as indicated.  -Follow-up in 3 months.      Orders:  -     POC Glucose Fingerstick  -     POC Glycosylated Hemoglobin (Hb A1C)  -     Comprehensive Metabolic Panel  -     Lipid Panel  -     Microalbumin / Creatinine Urine Ratio - Urine, Clean Catch  -     TSH    2. Essential hypertension  Assessment & Plan:  Hypertension is newly " identified.  Start lisinopril 5 mg daily  Blood pressure will be reassessed at the next regular appointment.      Other orders  -     lisinopril (PRINIVIL,ZESTRIL) 5 MG tablet; Take 1 tablet by mouth Daily.  Dispense: 30 tablet; Refill: 2  -     dapagliflozin (Farxiga) 5 MG tablet tablet; Take 1 tablet by mouth Daily.  Dispense: 30 tablet; Refill: 2       Return in about 3 months (around 10/8/2022) for Follow-up appointment, A1C. The patient was instructed to contact the clinic with any interval questions or concerns.    This document has been electronically signed by AKIL Byrne  July 8, 2022 10:12 EDT  Endocrinology    Please note that portions of this document were completed with a voice recognition program. Efforts were made to edit the dictations, but occasionally words are mis-transcribed.

## 2022-07-08 NOTE — ASSESSMENT & PLAN NOTE
- Diabetes is above goal with A1c 7.5.  -Discussed dietary and exercise guidelines.  -Discussed importance of yearly eye exams.  -Discussed importance of maintaining optimal blood sugars.  -Continue metformin 1000 mg twice daily.  -Start Farxiga 5 mg daily. Discussed the medication's MOA and that it may cause more frequent urination particularly upon starting the medication. Take one tablet in the morning with or without food. Encouraged to drink plenty of water as to not get dehydrated especially in warmer weather or with activity. Also discussed there may be an increased incidence of UTIs or yeast infections and to monitor for signs/symptoms.  -Signs and symptoms of hypoglycemia reviewed with rule 15's advised.  -Fasting labs today, will follow as indicated.  -Follow-up in 3 months.

## 2022-07-27 ENCOUNTER — SPECIALTY PHARMACY (OUTPATIENT)
Dept: PHARMACY | Facility: HOSPITAL | Age: 42
End: 2022-07-27

## 2022-08-15 ENCOUNTER — SPECIALTY PHARMACY (OUTPATIENT)
Dept: PHARMACY | Facility: HOSPITAL | Age: 42
End: 2022-08-15

## 2022-09-06 ENCOUNTER — SPECIALTY PHARMACY (OUTPATIENT)
Dept: PHARMACY | Facility: HOSPITAL | Age: 42
End: 2022-09-06

## 2022-09-22 ENCOUNTER — SPECIALTY PHARMACY (OUTPATIENT)
Dept: PHARMACY | Facility: HOSPITAL | Age: 42
End: 2022-09-22

## 2022-09-22 DIAGNOSIS — E11.65 TYPE 2 DIABETES MELLITUS WITH HYPERGLYCEMIA, WITHOUT LONG-TERM CURRENT USE OF INSULIN: Chronic | ICD-10-CM

## 2022-09-22 RX ORDER — METFORMIN HYDROCHLORIDE 500 MG/1
1000 TABLET, EXTENDED RELEASE ORAL 2 TIMES DAILY
Qty: 120 TABLET | Refills: 5 | Status: SHIPPED | OUTPATIENT
Start: 2022-09-22 | End: 2022-10-14 | Stop reason: SDUPTHER

## 2022-09-22 RX ORDER — LISINOPRIL 5 MG/1
5 TABLET ORAL DAILY
Qty: 30 TABLET | Refills: 5 | Status: SHIPPED | OUTPATIENT
Start: 2022-09-22 | End: 2022-10-14 | Stop reason: SDUPTHER

## 2022-09-22 RX ORDER — DAPAGLIFLOZIN 5 MG/1
5 TABLET, FILM COATED ORAL DAILY
Qty: 30 TABLET | Refills: 5 | Status: SHIPPED | OUTPATIENT
Start: 2022-09-22 | End: 2022-10-14 | Stop reason: SDUPTHER

## 2022-09-22 NOTE — PROGRESS NOTES
Specialty Pharmacy Patient Management Program  Endocrinology Reassessment     David Sanchez is a 41 y.o. male with Type 2 Diabetes enrolled in the Endocrinology Patient Management program offered by [facility].  A follow-up was conducted, including assessment of continued therapy appropriateness, medication adherence, and side effect incidence and management for Metformin and Farxiga.     Patient is taking metformin ER 2,000 mg daily and Farxiga 5 mg daily to control BG. He reports tolerating the medications well, denies changes in medications, and denies missing any doses of his medications.     Changes to Insurance Coverage or Financial Support  None     Relevant Past Medical History and Comorbidities  Relevant medical history and concomitant health conditions were discussed with the patient. The patient's chart has been reviewed for relevant past medical history and comorbid health conditions and updated as necessary.   Past Medical History:   Diagnosis Date   • Disorder associated with type 2 diabetes mellitus (HCC)    • Hypercholesterolemia    • Type 2 diabetes mellitus (HCC) 2017     Social History     Socioeconomic History   • Marital status:    Tobacco Use   • Smoking status: Former Smoker     Types: Cigarettes     Quit date:      Years since quittin.7   • Smokeless tobacco: Never Used   Vaping Use   • Vaping Use: Former   Substance and Sexual Activity   • Alcohol use: Yes     Comment: occ   • Drug use: Never   • Sexual activity: Defer       Problem list reviewed by Breanna Ramirez RPH on 2022 at 12:20 PM    Allergies  Known allergies and reactions were discussed with the patient. The patient's chart has been reviewed for allergy information and updated as necessary.   Patient has no known allergies.    Allergies reviewed by Breanna Ramirez RPH on 2022 at 12:19 PM    Relevant Laboratory Values  A1C Last 3 Results    HGBA1C Last 3 Results 3/18/22 7/8/22    Hemoglobin A1C 7.1 7.5           Lab Results   Component Value Date    HGBA1C 7.5 07/08/2022     No results found for: GLUCOSE, CALCIUM, NA, K, CO2, CL, BUN, CREATININE, EGFRIFAFRI, EGFRIFNONA, BCR, ANIONGAP  No results found for: CHOL, CHLPL, TRIG, HDL, LDL, LDLDIRECT      Current Medication List  This medication list has been reviewed with the patient and evaluated for any interactions or necessary modifications/recommendations, and updated to include all prescription medications, OTC medications, and supplements the patient is currently taking.  This list reflects what is contained in the patient's profile, which has also been marked as reviewed to communicate to other providers it is the most up to date version of the patient's current medication therapy.     Current Outpatient Medications:   •  dapagliflozin (Farxiga) 5 MG tablet tablet, Take 1 tablet by mouth Daily., Disp: 30 tablet, Rfl: 5  •  ibuprofen (ADVIL,MOTRIN) 800 MG tablet, Take 1 tablet by mouth Every 8 (Eight) Hours As Needed (pain)., Disp: 20 tablet, Rfl: 1  •  Lancets (OneTouch Delica Plus [id number]) misc, Use to test blood sugar twice daily, Disp: 100 each, Rfl: 5  •  lisinopril (PRINIVIL,ZESTRIL) 5 MG tablet, Take 1 tablet by mouth Daily., Disp: 30 tablet, Rfl: 5  •  metFORMIN ER (GLUCOPHAGE-XR) 500 MG 24 hr tablet, Take 2 tablets by mouth 2 (Two) Times a Day for 30 days., Disp: 120 tablet, Rfl: 5  •  OneTouch Verio test strip, Use to test blood sugar once daily (Patient taking differently: Use to test blood sugar once daily), Disp: 100 each, Rfl: 3    Medicines reviewed by Breanna Ramirez [facility] on 9/22/2022 at 12:20 PM    Drug Interactions  · Coadministration of metformin and NSAIDs may increase the risk of acute renal failure and lactic acidosis - we will continue to monitor kidney function (he only uses IBU PRN)  · ACE inhibitors may enhance the adverse/toxic effect of metformin. This includes both a risk for hypoglycemia and for lactic  acidosis.    Adverse Drug Reactions  • Adverse Reactions Experienced: None  • Plan for ADR Management: Not Required    Hospitalizations and Urgent Care Since Last Assessment  • Hospitalizations or Admissions: None  • ED Visits: None  • Urgent Office Visits: None    Adherence and Self-Administration  Adherence related patient's specialty therapy was discussed with the patient. The Adherence segment of this outreach has been reviewed and updated.     • Ongoing or New Barriers to Patient Adherence and/or Self-Administration: None   • Methods for Supporting Patient Adherence and/or Self-Administration: None Required     Goals of Therapy  Goals related to the patient's specialty therapy was discussed with the patient. The Patient Goals segment of this outreach has been reviewed and updated.    Goals     • Consistently take medications as prescribed     • HEMOGLOBIN A1C < 7           Reassessment Plan & Follow-Up  1. Medication Therapy Changes: None today. Patient needs refills on all medications. [facility] Farxiga, Metformin, and Lisinopril.   2. Additional Plans, Therapy Recommendations, or Therapy Problems to Be Addressed: None - Will follow-up with patient next month at scheduled office visit.   3. Pharmacist to perform regular reassessments no more than (6) months from the previous assessment.  4. Care Coordinator to set up future refill outreaches, coordinate prescription delivery, and escalate clinical questions to pharmacist.     Attestation  I attest that the specialty medication(s) addressed above are appropriate for the patient based on my reassessment.  If the prescribed therapy is at any point deemed not appropriate based on the current or future assessments, a consultation will be initiated with the patient's specialty care provider to determine the best course of action. The revised plan of therapy will be documented along with any additional patient education provided.     Breanna  James PharmTANVIR   9/22/2022  12:33 EDT

## 2022-10-05 DIAGNOSIS — E11.65 TYPE 2 DIABETES MELLITUS WITH HYPERGLYCEMIA, WITHOUT LONG-TERM CURRENT USE OF INSULIN: Primary | ICD-10-CM

## 2022-10-08 LAB
ALBUMIN SERPL-MCNC: 4.7 G/DL (ref 4–5)
ALBUMIN/CREAT UR: 17 MG/G CREAT (ref 0–29)
ALBUMIN/GLOB SERPL: 2 {RATIO} (ref 1.2–2.2)
ALP SERPL-CCNC: 76 IU/L (ref 44–121)
ALT SERPL-CCNC: 97 IU/L (ref 0–44)
AMBIG ABBREV CMP14 DEFAULT: NORMAL
AMBIG ABBREV LP DEFAULT: NORMAL
AST SERPL-CCNC: 43 IU/L (ref 0–40)
BILIRUB SERPL-MCNC: 0.6 MG/DL (ref 0–1.2)
BUN SERPL-MCNC: 10 MG/DL (ref 6–24)
BUN/CREAT SERPL: 9 (ref 9–20)
CALCIUM SERPL-MCNC: 9.6 MG/DL (ref 8.7–10.2)
CHLORIDE SERPL-SCNC: 102 MMOL/L (ref 96–106)
CHOLEST SERPL-MCNC: 219 MG/DL (ref 100–199)
CO2 SERPL-SCNC: 22 MMOL/L (ref 20–29)
CREAT SERPL-MCNC: 1.08 MG/DL (ref 0.76–1.27)
CREAT UR-MCNC: 231.3 MG/DL
EGFRCR SERPLBLD CKD-EPI 2021: 88 ML/MIN/1.73
GLOBULIN SER CALC-MCNC: 2.3 G/DL (ref 1.5–4.5)
GLUCOSE SERPL-MCNC: 114 MG/DL (ref 70–99)
HDLC SERPL-MCNC: 40 MG/DL
LDLC SERPL CALC-MCNC: 146 MG/DL (ref 0–99)
MICROALBUMIN UR-MCNC: 39.6 UG/ML
POTASSIUM SERPL-SCNC: 4.1 MMOL/L (ref 3.5–5.2)
PROT SERPL-MCNC: 7 G/DL (ref 6–8.5)
SODIUM SERPL-SCNC: 142 MMOL/L (ref 134–144)
TRIGL SERPL-MCNC: 184 MG/DL (ref 0–149)
TSH SERPL DL<=0.005 MIU/L-ACNC: 1.33 UIU/ML (ref 0.45–4.5)
VLDLC SERPL CALC-MCNC: 33 MG/DL (ref 5–40)

## 2022-10-10 RX ORDER — ROSUVASTATIN CALCIUM 5 MG/1
5 TABLET, COATED ORAL NIGHTLY
Qty: 30 TABLET | Refills: 11 | Status: SHIPPED | OUTPATIENT
Start: 2022-10-10 | End: 2023-01-19

## 2022-10-14 ENCOUNTER — OFFICE VISIT (OUTPATIENT)
Dept: ENDOCRINOLOGY | Facility: CLINIC | Age: 42
End: 2022-10-14

## 2022-10-14 ENCOUNTER — SPECIALTY PHARMACY (OUTPATIENT)
Dept: PHARMACY | Facility: HOSPITAL | Age: 42
End: 2022-10-14

## 2022-10-14 VITALS
HEART RATE: 90 BPM | OXYGEN SATURATION: 98 % | HEIGHT: 76 IN | SYSTOLIC BLOOD PRESSURE: 128 MMHG | DIASTOLIC BLOOD PRESSURE: 70 MMHG | WEIGHT: 205.6 LBS | BODY MASS INDEX: 25.04 KG/M2

## 2022-10-14 DIAGNOSIS — E11.65 TYPE 2 DIABETES MELLITUS WITH HYPERGLYCEMIA, WITHOUT LONG-TERM CURRENT USE OF INSULIN: Primary | Chronic | ICD-10-CM

## 2022-10-14 DIAGNOSIS — E11.65 TYPE 2 DIABETES MELLITUS WITH HYPERGLYCEMIA, WITHOUT LONG-TERM CURRENT USE OF INSULIN: Chronic | ICD-10-CM

## 2022-10-14 LAB
EXPIRATION DATE: ABNORMAL
GLUCOSE BLDC GLUCOMTR-MCNC: 134 MG/DL (ref 70–130)
HBA1C MFR BLD: 6.6 %
Lab: ABNORMAL

## 2022-10-14 PROCEDURE — 82962 GLUCOSE BLOOD TEST: CPT | Performed by: NURSE PRACTITIONER

## 2022-10-14 PROCEDURE — 99213 OFFICE O/P EST LOW 20 MIN: CPT | Performed by: NURSE PRACTITIONER

## 2022-10-14 PROCEDURE — 83036 HEMOGLOBIN GLYCOSYLATED A1C: CPT | Performed by: NURSE PRACTITIONER

## 2022-10-14 RX ORDER — METFORMIN HYDROCHLORIDE 500 MG/1
1000 TABLET, EXTENDED RELEASE ORAL 2 TIMES DAILY
Qty: 360 TABLET | Refills: 1 | Status: SHIPPED | OUTPATIENT
Start: 2022-10-14 | End: 2023-01-19 | Stop reason: SDUPTHER

## 2022-10-14 RX ORDER — DAPAGLIFLOZIN 5 MG/1
5 TABLET, FILM COATED ORAL DAILY
Qty: 90 TABLET | Refills: 1 | Status: SHIPPED | OUTPATIENT
Start: 2022-10-14 | End: 2023-01-19

## 2022-10-14 RX ORDER — LISINOPRIL 5 MG/1
5 TABLET ORAL DAILY
Qty: 90 TABLET | Refills: 1 | Status: SHIPPED | OUTPATIENT
Start: 2022-10-14 | End: 2023-01-19 | Stop reason: SDUPTHER

## 2022-10-14 RX ORDER — LORATADINE 10 MG/1
CAPSULE, LIQUID FILLED ORAL
COMMUNITY
End: 2023-03-10

## 2022-10-14 RX ORDER — LANSOPRAZOLE 15 MG/1
15 CAPSULE, DELAYED RELEASE ORAL DAILY
COMMUNITY
End: 2023-03-10

## 2022-10-14 NOTE — PROGRESS NOTES
Specialty Pharmacy Patient Management Program  Endocrinology Reassessment     David Sanchez is a 41 y.o. male with Type 2 Diabetes enrolled in the Endocrinology Patient Management program offered by [facility].  A follow-up was conducted, including assessment of continued therapy appropriateness, medication adherence, and side effect incidence and management for Metformin and Farxiga.     Patient is taking metformin ER 2,000 mg daily and Farxiga 5 mg daily to control BG. He reports tolerating the medications well, denies changes in medications, and denies missing any doses of his medications.     Changes to Insurance Coverage or Financial Support  None     Relevant Past Medical History and Comorbidities  Relevant medical history and concomitant health conditions were discussed with the patient. The patient's chart has been reviewed for relevant past medical history and comorbid health conditions and updated as necessary.   Past Medical History:   Diagnosis Date   • Disorder associated with type 2 diabetes mellitus (HCC)    • Hypercholesterolemia    • Type 2 diabetes mellitus (HCC) 2017     Social History     Socioeconomic History   • Marital status:    Tobacco Use   • Smoking status: Former     Years: 5.00     Types: Cigarettes     Start date: 2000     Quit date: 2008     Years since quittin.7   • Smokeless tobacco: Never   Vaping Use   • Vaping Use: Former   Substance and Sexual Activity   • Alcohol use: Yes     Alcohol/week: 10.0 standard drinks     Types: 10 Cans of beer per week     Comment: occ   • Drug use: Never   • Sexual activity: Yes     Partners: Male     Birth control/protection: I.U.D.       Problem list reviewed by Delia Aguiar, PharmD on 10/14/2022 at  9:16 AM    Allergies  Known allergies and reactions were discussed with the patient. The patient's chart has been reviewed for allergy information and updated as necessary.   Patient has no known  allergies.    Allergies reviewed by Delia Aguiar, PharmD on 10/14/2022 at  9:16 AM    Relevant Laboratory Values  A1C Last 3 Results    HGBA1C Last 3 Results 3/18/22 7/8/22 10/14/22   Hemoglobin A1C 7.1 7.5 6.6           Lab Results   Component Value Date    HGBA1C 6.6 10/14/2022     Lab Results   Component Value Date    GLUCOSE 114 (H) 10/07/2022    CALCIUM 9.6 10/07/2022     10/07/2022    K 4.1 10/07/2022    CO2 22 10/07/2022     10/07/2022    BUN 10 10/07/2022    CREATININE 1.08 10/07/2022    BCR 9 10/07/2022     Lab Results   Component Value Date    CHLPL 219 (H) 10/07/2022    TRIG 184 (H) 10/07/2022    HDL 40 10/07/2022     (H) 10/07/2022     Microalbumin    Microalbumin 10/7/22   Microalbumin, Urine 39.6             Current Medication List  This medication list has been reviewed with the patient and evaluated for any interactions or necessary modifications/recommendations, and updated to include all prescription medications, OTC medications, and supplements the patient is currently taking.  This list reflects what is contained in the patient's profile, which has also been marked as reviewed to communicate to other providers it is the most up to date version of the patient's current medication therapy.     Current Outpatient Medications:   •  dapagliflozin (Farxiga) 5 MG tablet tablet, Take 1 tablet by mouth Daily., Disp: 90 tablet, Rfl: 1  •  lisinopril (PRINIVIL,ZESTRIL) 5 MG tablet, Take 1 tablet by mouth Daily., Disp: 90 tablet, Rfl: 1  •  metFORMIN ER (GLUCOPHAGE-XR) 500 MG 24 hr tablet, Take 2 tablets by mouth 2 (Two) Times a Day for 30 days., Disp: 360 tablet, Rfl: 1  •  ibuprofen (ADVIL,MOTRIN) 800 MG tablet, Take 1 tablet by mouth Every 8 (Eight) Hours As Needed (pain)., Disp: 20 tablet, Rfl: 1  •  Lancets (OneTouch Delica Plus [id number]) misc, Use to test blood sugar twice daily, Disp: 100 each, Rfl: 5  •  lansoprazole (PREVACID) 15 MG capsule, Take 1 capsule by mouth  Daily., Disp: , Rfl:   •  Loratadine (Claritin) 10 MG capsule, Take  by mouth., Disp: , Rfl:   •  OneTouch Verio test strip, Use to test blood sugar once daily (Patient taking differently: Use to test blood sugar once daily), Disp: 100 each, Rfl: 3  •  rosuvastatin (Crestor) 5 MG tablet, Take 1 tablet by mouth Every Night., Disp: 30 tablet, Rfl: 11    Medicines reviewed by Delia Aguiar, PharmD on 10/14/2022 at  9:16 AM    Drug Interactions  · Coadministration of metformin and NSAIDs may increase the risk of acute renal failure and lactic acidosis - we will continue to monitor kidney function (he only uses IBU PRN)  · ACE inhibitors may enhance the adverse/toxic effect of metformin. This includes both a risk for hypoglycemia and for lactic acidosis.    Adverse Drug Reactions  • Adverse Reactions Experienced: None  • Plan for ADR Management: Not Required    Hospitalizations and Urgent Care Since Last Assessment  • Hospitalizations or Admissions: None  • ED Visits: None  • Urgent Office Visits: None    Adherence and Self-Administration  Adherence related patient's specialty therapy was discussed with the patient. The Adherence segment of this outreach has been reviewed and updated.     • Ongoing or New Barriers to Patient Adherence and/or Self-Administration: None   • Methods for Supporting Patient Adherence and/or Self-Administration: None Required     Goals of Therapy  Goals related to the patient's specialty therapy was discussed with the patient. The Patient Goals segment of this outreach has been reviewed and updated.    Goals     • Consistently take medications as prescribed     • HEMOGLOBIN A1C < 7           Reassessment Plan & Follow-Up  1. Medication Therapy Changes: None today. Patient needs refills on all medications. Will send new RX to [facility] for Farxiga, Metformin, and Lisinopril.   2. Additional Plans, Therapy Recommendations, or Therapy Problems to Be Addressed: None - Will follow-up with  patient next month at scheduled office visit.   3. Pharmacist to perform regular reassessments no more than (6) months from the previous assessment.  4. Care Coordinator to set up future refill outreaches, coordinate prescription delivery, and escalate clinical questions to pharmacist.     Attestation  I attest that the specialty medication(s) addressed above are appropriate for the patient based on my reassessment.  If the prescribed therapy is at any point deemed not appropriate based on the current or future assessments, a consultation will be initiated with the patient's specialty care provider to determine the best course of action. The revised plan of therapy will be documented along with any additional patient education provided.     Delia Aguiar, PharmTANVIR  10/14/2022  09:20 EDT

## 2022-10-14 NOTE — ASSESSMENT & PLAN NOTE
-Diabetes is improving with A1c down from 7.5 to 6.6.  -Discussed dietary and exercise guidelines.  -Discussed importance of yearly eye exams.  -Discussed importance of maintaining optimal blood sugars.  -Continue metformin 1000 mg twice daily.  -Continue Farxiga 5 mg daily. Discussed the medication's MOA and that it may cause more frequent urination particularly upon starting the medication. Take one tablet in the morning with or without food. Encouraged to drink plenty of water as to not get dehydrated especially in warmer weather or with activity. Also discussed there may be an increased incidence of UTIs or yeast infections and to monitor for signs/symptoms.  -Signs and symptoms of hypoglycemia reviewed with rule 15's advised.  -Follow-up in 3 months.

## 2022-10-14 NOTE — PROGRESS NOTES
"Chief Complaint   Patient presents with   • Diabetes     Type 2        David Sanchez is a 41 y.o. male had concerns including Diabetes (Type 2).    T2DM.     He is checking blood sugars rarely.  Current medications for diabetes include Metformin 1,000 mg BID, Farxiga 5 mg QD.  Most recent optho exam: is upcoming  Place of optho exam: [facility]  Hypos: none  He has not been following any dietary/exercise guidelines.  He does not plan to change this at this time.    He is on Lisinopril.  He has Crestor but has not started that yet.    The following portions of the patient's history were reviewed and updated as appropriate: allergies, current medications, past family history, past medical history, past social history, past surgical history and problem list.      Review of Systems   Constitutional: Positive for fatigue.   Eyes: Negative.    Endocrine: Negative for polydipsia, polyphagia and polyuria.   Musculoskeletal: Positive for arthralgias.   Psychiatric/Behavioral: Negative for sleep disturbance.   All other systems reviewed and are negative.       Physical Exam  Vitals reviewed.   Constitutional:       Appearance: Normal appearance.   Eyes:      Extraocular Movements: Extraocular movements intact.   Cardiovascular:      Rate and Rhythm: Normal rate.   Pulmonary:      Effort: Pulmonary effort is normal.   Neurological:      Mental Status: He is alert and oriented to person, place, and time.   Psychiatric:         Mood and Affect: Mood normal.         Behavior: Behavior normal.         Thought Content: Thought content normal.         Judgment: Judgment normal.        /70 (BP Location: Left arm, Patient Position: Sitting, Cuff Size: Adult)   Pulse 90   Ht 193 cm (76\")   Wt 93.3 kg (205 lb 9.6 oz)   SpO2 98%   BMI 25.03 kg/m²      Labs and imaging    CMP:  Lab Results   Component Value Date    BUN 10 10/07/2022    CREATININE 1.08 10/07/2022    BCR 9 10/07/2022     10/07/2022    K 4.1 " 10/07/2022    CO2 22 10/07/2022    CALCIUM 9.6 10/07/2022    PROTENTOTREF 7.0 10/07/2022    ALBUMIN 4.7 10/07/2022    LABGLOBREF 2.3 10/07/2022    LABIL2 2.0 10/07/2022    BILITOT 0.6 10/07/2022    ALKPHOS 76 10/07/2022    AST 43 (H) 10/07/2022    ALT 97 (H) 10/07/2022     Lipid Panel:  Lab Results   Component Value Date    TRIG 184 (H) 10/07/2022    HDL 40 10/07/2022    VLDL 33 10/07/2022     (H) 10/07/2022     HbA1c:  Lab Results   Component Value Date    HGBA1C 6.6 10/14/2022    HGBA1C 7.5 07/08/2022     Glucose:    Lab Results   Component Value Date    POCGLU 134 (A) 10/14/2022     Microalbumin:  Lab Results   Component Value Date    MALBCRERATIO 17 10/07/2022     TSH:  Lab Results   Component Value Date    TSH 1.330 10/07/2022       Assessment and plan  Diagnoses and all orders for this visit:    1. Type 2 diabetes mellitus with hyperglycemia, without long-term current use of insulin (HCC) (Primary)  Assessment & Plan:  -Diabetes is improving with A1c down from 7.5 to 6.6.  -Discussed dietary and exercise guidelines.  -Discussed importance of yearly eye exams.  -Discussed importance of maintaining optimal blood sugars.  -Continue metformin 1000 mg twice daily.  -Continue Farxiga 5 mg daily. Discussed the medication's MOA and that it may cause more frequent urination particularly upon starting the medication. Take one tablet in the morning with or without food. Encouraged to drink plenty of water as to not get dehydrated especially in warmer weather or with activity. Also discussed there may be an increased incidence of UTIs or yeast infections and to monitor for signs/symptoms.  -Signs and symptoms of hypoglycemia reviewed with rule 15's advised.  -Follow-up in 3 months.    Orders:  -     POC Glucose Fingerstick  -     POC Glycosylated Hemoglobin (Hb A1C)       Return in about 3 months (around 1/14/2023) for Follow-up appointment, A1C. The patient was instructed to contact the clinic with any interval  questions or concerns.    This document has been electronically signed by AKIL Byrne  October 14, 2022 09:29 EDT  Endocrinology

## 2022-11-02 ENCOUNTER — SPECIALTY PHARMACY (OUTPATIENT)
Dept: PHARMACY | Facility: HOSPITAL | Age: 42
End: 2022-11-02

## 2022-12-02 ENCOUNTER — SPECIALTY PHARMACY (OUTPATIENT)
Dept: PHARMACY | Facility: HOSPITAL | Age: 42
End: 2022-12-02

## 2022-12-02 RX ORDER — IBUPROFEN 800 MG/1
800 TABLET ORAL EVERY 8 HOURS PRN
Qty: 20 TABLET | Refills: 1 | Status: SHIPPED | OUTPATIENT
Start: 2022-12-02 | End: 2023-02-17 | Stop reason: SDUPTHER

## 2022-12-19 ENCOUNTER — SPECIALTY PHARMACY (OUTPATIENT)
Dept: PHARMACY | Facility: HOSPITAL | Age: 42
End: 2022-12-19

## 2023-01-09 ENCOUNTER — SPECIALTY PHARMACY (OUTPATIENT)
Dept: PHARMACY | Facility: HOSPITAL | Age: 43
End: 2023-01-09
Payer: COMMERCIAL

## 2023-01-19 ENCOUNTER — OFFICE VISIT (OUTPATIENT)
Dept: ENDOCRINOLOGY | Facility: CLINIC | Age: 43
End: 2023-01-19
Payer: COMMERCIAL

## 2023-01-19 ENCOUNTER — SPECIALTY PHARMACY (OUTPATIENT)
Dept: PHARMACY | Facility: HOSPITAL | Age: 43
End: 2023-01-19
Payer: COMMERCIAL

## 2023-01-19 ENCOUNTER — LAB (OUTPATIENT)
Dept: LAB | Facility: HOSPITAL | Age: 43
End: 2023-01-19
Payer: COMMERCIAL

## 2023-01-19 VITALS
OXYGEN SATURATION: 97 % | BODY MASS INDEX: 24.84 KG/M2 | HEART RATE: 90 BPM | SYSTOLIC BLOOD PRESSURE: 116 MMHG | HEIGHT: 75 IN | WEIGHT: 199.8 LBS | DIASTOLIC BLOOD PRESSURE: 82 MMHG

## 2023-01-19 DIAGNOSIS — I10 ESSENTIAL HYPERTENSION: ICD-10-CM

## 2023-01-19 DIAGNOSIS — E78.00 HYPERCHOLESTEROLEMIA: Chronic | ICD-10-CM

## 2023-01-19 DIAGNOSIS — E11.65 TYPE 2 DIABETES MELLITUS WITH HYPERGLYCEMIA, WITHOUT LONG-TERM CURRENT USE OF INSULIN: Chronic | ICD-10-CM

## 2023-01-19 DIAGNOSIS — E11.65 TYPE 2 DIABETES MELLITUS WITH HYPERGLYCEMIA, WITHOUT LONG-TERM CURRENT USE OF INSULIN: Primary | ICD-10-CM

## 2023-01-19 LAB
ALBUMIN SERPL-MCNC: 4.9 G/DL (ref 3.5–5.2)
ALBUMIN UR-MCNC: 1.2 MG/DL
ALBUMIN/GLOB SERPL: 2 G/DL
ALP SERPL-CCNC: 71 U/L (ref 39–117)
ALT SERPL W P-5'-P-CCNC: 68 U/L (ref 1–41)
ANION GAP SERPL CALCULATED.3IONS-SCNC: 9 MMOL/L (ref 5–15)
AST SERPL-CCNC: 37 U/L (ref 1–40)
BILIRUB SERPL-MCNC: 0.7 MG/DL (ref 0–1.2)
BUN SERPL-MCNC: 18 MG/DL (ref 6–20)
BUN/CREAT SERPL: 19.1 (ref 7–25)
CALCIUM SPEC-SCNC: 9.5 MG/DL (ref 8.6–10.5)
CHLORIDE SERPL-SCNC: 101 MMOL/L (ref 98–107)
CHOLEST SERPL-MCNC: 201 MG/DL (ref 0–200)
CO2 SERPL-SCNC: 27 MMOL/L (ref 22–29)
CREAT SERPL-MCNC: 0.94 MG/DL (ref 0.76–1.27)
CREAT UR-MCNC: 217.9 MG/DL
EGFRCR SERPLBLD CKD-EPI 2021: 103.8 ML/MIN/1.73
EXPIRATION DATE: NORMAL
GLOBULIN UR ELPH-MCNC: 2.4 GM/DL
GLUCOSE BLDC GLUCOMTR-MCNC: 126 MG/DL (ref 70–130)
GLUCOSE SERPL-MCNC: 117 MG/DL (ref 65–99)
HBA1C MFR BLD: 6.4 %
HDLC SERPL-MCNC: 43 MG/DL (ref 40–60)
LDLC SERPL CALC-MCNC: 139 MG/DL (ref 0–100)
LDLC/HDLC SERPL: 3.2 {RATIO}
Lab: NORMAL
MICROALBUMIN/CREAT UR: 5.5 MG/G
POTASSIUM SERPL-SCNC: 4.4 MMOL/L (ref 3.5–5.2)
PROT SERPL-MCNC: 7.3 G/DL (ref 6–8.5)
SODIUM SERPL-SCNC: 137 MMOL/L (ref 136–145)
T4 FREE SERPL-MCNC: 1.29 NG/DL (ref 0.93–1.7)
TRIGL SERPL-MCNC: 103 MG/DL (ref 0–150)
TSH SERPL DL<=0.05 MIU/L-ACNC: 1.72 UIU/ML (ref 0.27–4.2)
VLDLC SERPL-MCNC: 19 MG/DL (ref 5–40)

## 2023-01-19 PROCEDURE — 82043 UR ALBUMIN QUANTITATIVE: CPT | Performed by: NURSE PRACTITIONER

## 2023-01-19 PROCEDURE — 80053 COMPREHEN METABOLIC PANEL: CPT | Performed by: NURSE PRACTITIONER

## 2023-01-19 PROCEDURE — 36415 COLL VENOUS BLD VENIPUNCTURE: CPT | Performed by: NURSE PRACTITIONER

## 2023-01-19 PROCEDURE — 84439 ASSAY OF FREE THYROXINE: CPT | Performed by: NURSE PRACTITIONER

## 2023-01-19 PROCEDURE — 83036 HEMOGLOBIN GLYCOSYLATED A1C: CPT | Performed by: NURSE PRACTITIONER

## 2023-01-19 PROCEDURE — 99214 OFFICE O/P EST MOD 30 MIN: CPT | Performed by: NURSE PRACTITIONER

## 2023-01-19 PROCEDURE — 84443 ASSAY THYROID STIM HORMONE: CPT | Performed by: NURSE PRACTITIONER

## 2023-01-19 PROCEDURE — 82570 ASSAY OF URINE CREATININE: CPT | Performed by: NURSE PRACTITIONER

## 2023-01-19 PROCEDURE — 82962 GLUCOSE BLOOD TEST: CPT | Performed by: NURSE PRACTITIONER

## 2023-01-19 PROCEDURE — 80061 LIPID PANEL: CPT | Performed by: NURSE PRACTITIONER

## 2023-01-19 RX ORDER — ROSUVASTATIN CALCIUM 5 MG/1
5 TABLET, COATED ORAL NIGHTLY
Qty: 30 TABLET | Refills: 11 | Status: CANCELLED | OUTPATIENT
Start: 2023-01-19 | End: 2024-01-19

## 2023-01-19 RX ORDER — LISINOPRIL 5 MG/1
5 TABLET ORAL DAILY
Qty: 90 TABLET | Refills: 1 | Status: SHIPPED | OUTPATIENT
Start: 2023-01-19 | End: 2024-01-19

## 2023-01-19 RX ORDER — METFORMIN HYDROCHLORIDE 500 MG/1
1000 TABLET, EXTENDED RELEASE ORAL 2 TIMES DAILY
Qty: 360 TABLET | Refills: 1 | Status: SHIPPED | OUTPATIENT
Start: 2023-01-19 | End: 2023-04-25

## 2023-01-19 RX ORDER — DAPAGLIFLOZIN 10 MG/1
10 TABLET, FILM COATED ORAL DAILY
Qty: 90 TABLET | Refills: 1 | Status: SHIPPED | OUTPATIENT
Start: 2023-01-19

## 2023-01-19 RX ORDER — DAPAGLIFLOZIN 5 MG/1
5 TABLET, FILM COATED ORAL DAILY
Qty: 90 TABLET | Refills: 1 | Status: CANCELLED | OUTPATIENT
Start: 2023-01-19

## 2023-01-19 NOTE — PROGRESS NOTES
"Chief Complaint   Patient presents with   • Diabetes     F/U        David Sanchez is a 42 y.o. male had concerns including Diabetes (F/U).    T2DM.     He is checking blood sugars rarely.  Current medications for diabetes include Metformin 1,000 mg BID, Farxiga 5 mg QD.  Most recent optho exam: is upcoming  Place of optho exam: [facility]  Hypos: none  He has not been following any dietary/exercise guidelines.  He does not plan to change this at this time.    He is on Lisinopril.  He has Crestor but has not started that yet.    The following portions of the patient's history were reviewed and updated as appropriate: allergies, current medications, past family history, past medical history, past social history, past surgical history and problem list.      Review of Systems   Constitutional: Positive for fatigue.   Eyes: Negative.    Endocrine: Negative for polydipsia, polyphagia and polyuria.   Musculoskeletal: Positive for arthralgias.   Psychiatric/Behavioral: Negative for sleep disturbance.   All other systems reviewed and are negative.       Physical Exam  Vitals reviewed.   Constitutional:       Appearance: Normal appearance.   Eyes:      Extraocular Movements: Extraocular movements intact.   Cardiovascular:      Rate and Rhythm: Normal rate.   Pulmonary:      Effort: Pulmonary effort is normal.   Neurological:      Mental Status: He is alert and oriented to person, place, and time.   Psychiatric:         Mood and Affect: Mood normal.         Behavior: Behavior normal.         Thought Content: Thought content normal.         Judgment: Judgment normal.        /82 (BP Location: Left arm, Patient Position: Sitting, Cuff Size: Adult)   Pulse 90   Ht 190.5 cm (75\")   Wt 90.6 kg (199 lb 12.8 oz)   SpO2 97%   BMI 24.97 kg/m²      Labs and imaging    CMP:  Lab Results   Component Value Date    BUN 10 10/07/2022    CREATININE 1.08 10/07/2022    BCR 9 10/07/2022     10/07/2022    K 4.1 10/07/2022 "    CO2 22 10/07/2022    CALCIUM 9.6 10/07/2022    PROTENTOTREF 7.0 10/07/2022    ALBUMIN 4.7 10/07/2022    LABGLOBREF 2.3 10/07/2022    LABIL2 2.0 10/07/2022    BILITOT 0.6 10/07/2022    ALKPHOS 76 10/07/2022    AST 43 (H) 10/07/2022    ALT 97 (H) 10/07/2022     Lipid Panel:  Lab Results   Component Value Date    TRIG 184 (H) 10/07/2022    HDL 40 10/07/2022    VLDL 33 10/07/2022     (H) 10/07/2022     HbA1c:  Lab Results   Component Value Date    HGBA1C 6.4 01/19/2023    HGBA1C 6.6 10/14/2022     Glucose:    Lab Results   Component Value Date    POCGLU 126 01/19/2023     Microalbumin:  Lab Results   Component Value Date    MALBCRERATIO 17 10/07/2022     TSH:  Lab Results   Component Value Date    TSH 1.330 10/07/2022       Assessment and plan  Diagnoses and all orders for this visit:    1. Type 2 diabetes mellitus with hyperglycemia, without long-term current use of insulin (HCC) (Primary)  Assessment & Plan:  -Diabetes is improving with A1c down from 6.6 to 6.4.  -Discussed dietary and exercise guidelines.  -Discussed importance of yearly eye exams.  -Discussed importance of maintaining optimal blood sugars.  -Continue metformin 1000 mg twice daily.  -Increase Farxiga 10 mg daily. Discussed the medication's MOA and that it may cause more frequent urination particularly upon starting the medication. Take one tablet in the morning with or without food. Encouraged to drink plenty of water as to not get dehydrated especially in warmer weather or with activity. Also discussed there may be an increased incidence of UTIs or yeast infections and to monitor for signs/symptoms.  -Signs and symptoms of hypoglycemia reviewed with rule 15's advised.  -Follow-up in 6 months.    Orders:  -     POC Glucose Fingerstick  -     POC Glycosylated Hemoglobin (Hb A1C)  -     Comprehensive Metabolic Panel  -     Lipid Panel  -     Microalbumin / Creatinine Urine Ratio - Urine, Clean Catch  -     TSH  -     T4, free    2. Essential  hypertension  Assessment & Plan:  Hypertension is improving with treatment.  Continue current treatment regimen.  Blood pressure will be reassessed at the next regular appointment.      3. Hypercholesterolemia  Assessment & Plan:  Lipid abnormalities are unchanged.  Pharmacotherapy as ordered.  Lipids will be reassessed in 6 months.         Return in about 6 months (around 7/19/2023) for Follow-up appointment, A1C. The patient was instructed to contact the clinic with any interval questions or concerns.    This document has been electronically signed by AKIL Byrne  January 19, 2023 08:52 EST  Endocrinology

## 2023-01-19 NOTE — LETTER
January 19, 2023     Patient: David Sanchez   YOB: 1980   Date of Visit: 1/19/2023       To Whom It May Concern:    It is my medical opinion that David Sanchez may return to work on 01/20/2023.           Sincerely,        AKIL Byrne

## 2023-01-19 NOTE — PROGRESS NOTES
Specialty Pharmacy Patient Management Program  Endocrinology Reassessment     David Sanchez is a 42 y.o. male with Type 2 Diabetes seen by an Endocrinology Provider and enrolled in the Endocrinology Patient Management program offered by [facility]. A follow-up was conducted, including assessment of continued therapy appropriateness, medication adherence, and side effect incidence and management for Metformin and Farxiga.     Patient is taking metformin ER 2,000 mg daily and Farxiga 5 mg daily to control BG. He reports tolerating the medications well, denies changes in medications, and denies missing any doses of his medications. Patient reports that he never started taking Rosuvastatin and doesn't want to start.     Changes to Insurance Coverage or Financial Support  None     Relevant Past Medical History and Comorbidities  Relevant medical history and concomitant health conditions were discussed with the patient. The patient's chart has been reviewed for relevant past medical history and comorbid health conditions and updated as necessary.   Past Medical History:   Diagnosis Date   • Disorder associated with type 2 diabetes mellitus (HCC)    • Hypercholesterolemia    • Type 2 diabetes mellitus (HCC) 2017     Social History     Socioeconomic History   • Marital status:    Tobacco Use   • Smoking status: Former     Years: 5.00     Types: Cigarettes     Start date: 12/20/2000     Quit date: 1/1/2008     Years since quitting: 15.0   • Smokeless tobacco: Never   Vaping Use   • Vaping Use: Former   Substance and Sexual Activity   • Alcohol use: Yes     Alcohol/week: 10.0 standard drinks     Types: 10 Cans of beer per week     Comment: occ   • Drug use: Never   • Sexual activity: Yes     Partners: Male     Birth control/protection: I.U.D.       Problem list reviewed by Breanna Ramirez RP on 1/19/2023 at  8:40 AM    Allergies  Known allergies and reactions were discussed with the patient.  The patient's chart has been reviewed for allergy information and updated as necessary.   Patient has no known allergies.    Allergies reviewed by Breanna Ramirez RP on 1/19/2023 at  8:40 AM    Relevant Laboratory Values  A1C Last 3 Results    HGBA1C Last 3 Results 7/8/22 10/14/22 1/19/23   Hemoglobin A1C 7.5 6.6 6.4           Lab Results   Component Value Date    HGBA1C 6.4 01/19/2023     Lab Results   Component Value Date    GLUCOSE 114 (H) 10/07/2022    CALCIUM 9.6 10/07/2022     10/07/2022    K 4.1 10/07/2022    CO2 22 10/07/2022     10/07/2022    BUN 10 10/07/2022    CREATININE 1.08 10/07/2022    BCR 9 10/07/2022     Lab Results   Component Value Date    CHLPL 219 (H) 10/07/2022    TRIG 184 (H) 10/07/2022    HDL 40 10/07/2022     (H) 10/07/2022     Microalbumin    Microalbumin 10/7/22   Microalbumin, Urine 39.6             Current Medication List  This medication list has been reviewed with the patient and evaluated for any interactions or necessary modifications/recommendations, and updated to include all prescription medications, OTC medications, and supplements the patient is currently taking.  This list reflects what is contained in the patient's profile, which has also been marked as reviewed to communicate to other providers it is the most up to date version of the patient's current medication therapy.     Current Outpatient Medications:   •  ibuprofen (ADVIL,MOTRIN) 800 MG tablet, Take 1 tablet by mouth Every 8 (Eight) Hours As Needed (pain)., Disp: 20 tablet, Rfl: 1  •  Lancets (OneTouch Delica Plus [id number]) misc, Use to test blood sugar twice daily, Disp: 100 each, Rfl: 5  •  lansoprazole (PREVACID) 15 MG capsule, Take 1 capsule by mouth Daily., Disp: , Rfl:   •  lisinopril (PRINIVIL,ZESTRIL) 5 MG tablet, Take 1 tablet by mouth Daily., Disp: 90 tablet, Rfl: 1  •  Loratadine 10 MG capsule, Take  by mouth., Disp: , Rfl:   •  metFORMIN ER (GLUCOPHAGE-XR) 500 MG 24 hr tablet, Take 2  tablets by mouth 2 (Two) Times a Day for 30 days., Disp: 360 tablet, Rfl: 1  •  OneTouch Verio test strip, Use to test blood sugar once daily (Patient taking differently: Use to test blood sugar once daily), Disp: 100 each, Rfl: 3  •  dapagliflozin Propanediol (Farxiga) 10 MG tablet, Take 1 tablet by mouth Daily., Disp: 90 tablet, Rfl: 1    Medicines reviewed by Breanna Ramirez [facility] on 1/19/2023 at  8:40 AM    Drug Interactions  · Coadministration of metformin and NSAIDs may increase the risk of acute renal failure and lactic acidosis - we will continue to monitor kidney function (he only uses IBU PRN)  · ACE inhibitors may enhance the adverse/toxic effect of metformin. This includes both a risk for hypoglycemia and for lactic acidosis.    Adverse Drug Reactions  • Adverse Reactions Experienced: None  • Plan for ADR Management: Not Required    Hospitalizations and Urgent Care Since Last Assessment  • Hospitalizations or Admissions: None  • ED Visits: None  • Urgent Office Visits: None    Adherence and Self-Administration  Adherence related patient's specialty therapy was discussed with the patient. The Adherence segment of this outreach has been reviewed and updated.     • Ongoing or New Barriers to Patient Adherence and/or Self-Administration: None   • Methods for Supporting Patient Adherence and/or Self-Administration: None Required     Goals of Therapy  Goals related to the patient's specialty therapy was discussed with the patient. The Patient Goals segment of this outreach has been reviewed and updated.    Goals     • Consistently take medications as prescribed     • HEMOGLOBIN A1C < 7           Reassessment Plan & Follow-Up  1. Patient's diabetes continues to improve with A1C down to 6.4% from 6.6%.  2. Medication Therapy Changes:   · Increase to Farxiga 10 mg daily    · Continue metformin 1,000 mg twice daily   3. Additional Plans, Therapy Recommendations, or Therapy Problems to Be Addressed:   · Patient needs  refills on all medications. Will send new RX to [facility] for Farxiga, Metformin, and Lisinopril.   4. Pharmacist to perform regular reassessments no more than (6) months from the previous assessment.  5. Care Coordinator to set up future refill outreaches, coordinate prescription delivery, and escalate clinical questions to pharmacist.     Attestation  I attest that the specialty medication(s) addressed above are appropriate for the patient based on my reassessment.  If the prescribed therapy is at any point deemed not appropriate based on the current or future assessments, a consultation will be initiated with the patient's specialty care provider to determine the best course of action. The revised plan of therapy will be documented along with any additional patient education provided.      Breanna Ramirez, PharmD  1/19/2023  08:46 EST

## 2023-01-19 NOTE — ASSESSMENT & PLAN NOTE
-Diabetes is improving with A1c down from 6.6 to 6.4.  -Discussed dietary and exercise guidelines.  -Discussed importance of yearly eye exams.  -Discussed importance of maintaining optimal blood sugars.  -Continue metformin 1000 mg twice daily.  -Increase Farxiga 10 mg daily. Discussed the medication's MOA and that it may cause more frequent urination particularly upon starting the medication. Take one tablet in the morning with or without food. Encouraged to drink plenty of water as to not get dehydrated especially in warmer weather or with activity. Also discussed there may be an increased incidence of UTIs or yeast infections and to monitor for signs/symptoms.  -Signs and symptoms of hypoglycemia reviewed with rule 15's advised.  -Follow-up in 6 months.

## 2023-01-30 ENCOUNTER — SPECIALTY PHARMACY (OUTPATIENT)
Dept: PHARMACY | Facility: HOSPITAL | Age: 43
End: 2023-01-30
Payer: COMMERCIAL

## 2023-02-17 ENCOUNTER — SPECIALTY PHARMACY (OUTPATIENT)
Dept: ENDOCRINOLOGY | Facility: CLINIC | Age: 43
End: 2023-02-17
Payer: COMMERCIAL

## 2023-02-17 RX ORDER — IBUPROFEN 800 MG/1
800 TABLET ORAL EVERY 8 HOURS PRN
Qty: 20 TABLET | Refills: 1 | Status: SHIPPED | OUTPATIENT
Start: 2023-02-17

## 2023-02-24 ENCOUNTER — OFFICE VISIT (OUTPATIENT)
Dept: FAMILY MEDICINE CLINIC | Facility: CLINIC | Age: 43
End: 2023-02-24
Payer: COMMERCIAL

## 2023-02-24 VITALS
TEMPERATURE: 97.1 F | WEIGHT: 204.2 LBS | HEART RATE: 90 BPM | SYSTOLIC BLOOD PRESSURE: 128 MMHG | DIASTOLIC BLOOD PRESSURE: 80 MMHG | OXYGEN SATURATION: 97 % | BODY MASS INDEX: 25.39 KG/M2 | HEIGHT: 75 IN

## 2023-02-24 DIAGNOSIS — J98.8 CONGESTION OF UPPER AIRWAY: ICD-10-CM

## 2023-02-24 DIAGNOSIS — Z00.00 ANNUAL PHYSICAL EXAM: Primary | ICD-10-CM

## 2023-02-24 DIAGNOSIS — K63.5 POLYP OF COLON, UNSPECIFIED PART OF COLON, UNSPECIFIED TYPE: ICD-10-CM

## 2023-02-24 DIAGNOSIS — B02.9 HERPES ZOSTER WITHOUT COMPLICATION: ICD-10-CM

## 2023-02-24 DIAGNOSIS — R93.3 ABNORMAL COLONOSCOPY: ICD-10-CM

## 2023-02-24 DIAGNOSIS — Z76.89 ENCOUNTER TO ESTABLISH CARE: ICD-10-CM

## 2023-02-24 PROCEDURE — 99386 PREV VISIT NEW AGE 40-64: CPT | Performed by: FAMILY MEDICINE

## 2023-02-24 RX ORDER — ACYCLOVIR 50 MG/G
1 CREAM TOPICAL
Qty: 50 G | Refills: 0 | Status: SHIPPED | OUTPATIENT
Start: 2023-02-24 | End: 2023-03-10

## 2023-02-24 RX ORDER — VALACYCLOVIR HYDROCHLORIDE 500 MG/1
500 TABLET, FILM COATED ORAL 2 TIMES DAILY
COMMUNITY
End: 2023-02-28 | Stop reason: SDUPTHER

## 2023-02-24 RX ORDER — ACYCLOVIR 50 MG/G
1 CREAM TOPICAL
COMMUNITY
End: 2023-02-24 | Stop reason: SDUPTHER

## 2023-02-24 RX ORDER — CEPHALEXIN 500 MG/1
500 CAPSULE ORAL 2 TIMES DAILY
COMMUNITY
End: 2023-02-24

## 2023-02-24 RX ORDER — GUAIFENESIN AND DEXTROMETHORPHAN HYDROBROMIDE 600; 30 MG/1; MG/1
2 TABLET, EXTENDED RELEASE ORAL 2 TIMES DAILY PRN
Qty: 60 TABLET | Refills: 0 | Status: SHIPPED | OUTPATIENT
Start: 2023-02-24 | End: 2023-03-02

## 2023-02-24 NOTE — PROGRESS NOTES
Subjective   David Sanchez is a 42 y.o. male.   Pt presents today with CC of Herpes Zoster and Establish Care      History of Present Illness   History of Present Illness  1.  Patient is a 42-year-old male here to establish care.  #2 he reports that he had rectal bleeding about 10 years ago, he had a colonoscopy with Dr. Langley, polyps were found.  He would like to follow-up with colonoscopy.  #3 he has herpes zoster.  He was diagnosed couple days ago at urgent care.  He reportedly got back earlier this week from [place].  He developed lymphadenopathy in his posterior cervical chain, neck pain, and burning of his skin on his left posterior scalp.  It has been 50% improved after 2 days of valacyclovir pills and topical cream.  #4 he complains of congestion in his upper airway for 1 week.  He would like to discuss treatment options         The following portions of the patient's history were reviewed and updated as appropriate: allergies, current medications, past family history, past medical history, past social history, past surgical history and problem list.    Review of Systems   Constitutional: Negative for chills, fever and unexpected weight loss.   HENT: Negative for congestion and sore throat.    Eyes: Negative for blurred vision and visual disturbance.   Respiratory: Negative for cough and wheezing.    Cardiovascular: Negative for chest pain and palpitations.   Gastrointestinal: Negative for abdominal pain and diarrhea.   Endocrine: Negative for cold intolerance and heat intolerance.   Genitourinary: Negative for dysuria.   Musculoskeletal: Negative for arthralgias and neck stiffness.   Neurological: Negative for dizziness, seizures and syncope.   Psychiatric/Behavioral: Negative for self-injury, suicidal ideas and depressed mood.       Objective   Physical Exam  Vitals and nursing note reviewed.   Constitutional:       Appearance: He is well-developed.   HENT:      Head: Normocephalic and atraumatic.       Comments: Normal exam of upper airway.     Right Ear: External ear normal.      Left Ear: External ear normal.      Nose: Nose normal.   Eyes:      Conjunctiva/sclera: Conjunctivae normal.      Pupils: Pupils are equal, round, and reactive to light.   Cardiovascular:      Rate and Rhythm: Normal rate and regular rhythm.      Heart sounds: Normal heart sounds.   Pulmonary:      Effort: Pulmonary effort is normal.      Breath sounds: Normal breath sounds.   Abdominal:      General: Bowel sounds are normal.      Palpations: Abdomen is soft.   Musculoskeletal:      Cervical back: Normal range of motion and neck supple.   Skin:     General: Skin is warm and dry.      Comments: Faint rash that has dried on his posterior scalp.  Zoster is resolving.   Neurological:      Mental Status: He is alert and oriented to person, place, and time.   Psychiatric:         Behavior: Behavior normal.           Assessment & Plan   Diagnoses and all orders for this visit:    1. Annual physical exam (Primary)  Patient Counseling:  --Nutrition: Stressed importance of moderation in sodium/caffeine intake, saturated fat and cholesterol, caloric balance, sufficient intake of fresh fruits, vegetables, fiber, calcium, iron, and 1 mg of folate supplement per day (for females capable of pregnancy).  --Discussed the issue of estrogen replacement, calcium supplement, and the daily use of baby aspirin.  --Exercise: Stressed the importance of regular exercise.   --Substance Abuse: Discussed cessation/primary prevention of tobacco, alcohol, or other drug use; driving or other dangerous activities under the influence; availability of treatment for abuse.    --Sexuality: Discussed sexually transmitted diseases, partner selection, use of condoms, avoidance of unintended pregnancy  and contraceptive alternatives.   --Injury prevention: Discussed safety belts, safety helmets, smoke detector, smoking near bedding or upholstery.   --Dental health: Discussed  importance of regular tooth brushing, flossing, and dental visits.  --Immunizations reviewed.  --Discussed benefits of screening colonoscopy.  --After hours service discussed with patient  2. Polyp of colon, unspecified part of colon, unspecified type  -     Ambulatory Referral For Screening Colonoscopy    3. Abnormal colonoscopy  -     Ambulatory Referral For Screening Colonoscopy    4. Herpes zoster without complication  -     silver sulfadiazine (SILVADENE, SSD) 1 % cream; Apply 1 application topically to the appropriate area as directed 2 (Two) Times a Day.  Dispense: 50 g; Refill: 0  -     acyclovir (ZOVIRAX) 5 % cream; Apply 1 application topically to the appropriate area as directed Every 3 (Three) Hours.  Dispense: 50 g; Refill: 0    5. Encounter to establish care  He has diabetes.  He follows with diabetes specialist.  Does not need refills today.  6. Congestion of upper airway  -     guaifenesin-dextromethorphan (MUCINEX DM)  MG tablet sustained-release 12 hour tablet; Take 2 tablets by mouth 2 (Two) Times a Day As Needed (congestion).  Dispense: 60 tablet; Refill: 0               BMI is >= 25 and <30. (Overweight) The following options were offered after discussion;: exercise counseling/recommendations and nutrition counseling/recommendations          This document has been electronically signed by Mitch Reed DO  February 24, 2023 10:15 EST    Dictated Utilizing Dragon Dictation: Part of this note may be an electronic transcription/translation of spoken language to printed text using the Dragon Dictation System.

## 2023-02-28 ENCOUNTER — OFFICE VISIT (OUTPATIENT)
Dept: FAMILY MEDICINE CLINIC | Facility: CLINIC | Age: 43
End: 2023-02-28
Payer: COMMERCIAL

## 2023-02-28 VITALS
DIASTOLIC BLOOD PRESSURE: 80 MMHG | OXYGEN SATURATION: 98 % | TEMPERATURE: 97.8 F | HEART RATE: 117 BPM | WEIGHT: 203.8 LBS | BODY MASS INDEX: 25.34 KG/M2 | HEIGHT: 75 IN | SYSTOLIC BLOOD PRESSURE: 116 MMHG

## 2023-02-28 DIAGNOSIS — B02.9 HERPES ZOSTER WITHOUT COMPLICATION: ICD-10-CM

## 2023-02-28 DIAGNOSIS — F41.9 ANXIETY: Primary | ICD-10-CM

## 2023-02-28 PROCEDURE — 99214 OFFICE O/P EST MOD 30 MIN: CPT | Performed by: FAMILY MEDICINE

## 2023-02-28 RX ORDER — VALACYCLOVIR HYDROCHLORIDE 500 MG/1
500 TABLET, FILM COATED ORAL 2 TIMES DAILY
Qty: 10 TABLET | Refills: 0 | Status: SHIPPED | OUTPATIENT
Start: 2023-02-28 | End: 2023-03-10

## 2023-02-28 RX ORDER — HYDROXYZINE HYDROCHLORIDE 10 MG/1
10 TABLET, FILM COATED ORAL EVERY 8 HOURS PRN
Qty: 90 TABLET | Refills: 1 | Status: SHIPPED | OUTPATIENT
Start: 2023-02-28

## 2023-02-28 RX ORDER — CITALOPRAM 10 MG/1
10 TABLET ORAL DAILY
Qty: 30 TABLET | Refills: 1 | Status: SHIPPED | OUTPATIENT
Start: 2023-02-28 | End: 2023-03-10

## 2023-03-10 ENCOUNTER — OFFICE VISIT (OUTPATIENT)
Dept: SURGERY | Facility: CLINIC | Age: 43
End: 2023-03-10
Payer: COMMERCIAL

## 2023-03-10 VITALS
BODY MASS INDEX: 24.74 KG/M2 | SYSTOLIC BLOOD PRESSURE: 118 MMHG | WEIGHT: 199 LBS | HEART RATE: 100 BPM | HEIGHT: 75 IN | DIASTOLIC BLOOD PRESSURE: 78 MMHG

## 2023-03-10 DIAGNOSIS — Z12.11 SCREENING FOR COLON CANCER: Primary | ICD-10-CM

## 2023-03-10 DIAGNOSIS — Z86.010 HISTORY OF COLONIC POLYPS: ICD-10-CM

## 2023-03-10 PROCEDURE — 99213 OFFICE O/P EST LOW 20 MIN: CPT

## 2023-03-10 RX ORDER — POLYETHYLENE GLYCOL 3350 17 G/17G
POWDER, FOR SOLUTION ORAL
Qty: 238 G | Refills: 0 | Status: SHIPPED | OUTPATIENT
Start: 2023-03-10 | End: 2023-03-31 | Stop reason: HOSPADM

## 2023-03-10 RX ORDER — BISACODYL 5 MG
5 TABLET, DELAYED RELEASE (ENTERIC COATED) ORAL TAKE AS DIRECTED
Qty: 4 TABLET | Refills: 0 | Status: SHIPPED | OUTPATIENT
Start: 2023-03-10 | End: 2023-03-31 | Stop reason: HOSPADM

## 2023-03-10 NOTE — H&P (VIEW-ONLY)
Subjective   David Sanchez is a 42 y.o. male who presents today for Initial Evaluation    Chief Complaint:    Chief Complaint   Patient presents with   • Colonoscopy        History of Present Illness:    History of Present Illness David is a 42-year-old male who presents for screening colonoscopy.  He reports that he has had a colonoscopy in the past and has a personal history of colonic polyps.  Reports that he had a colonoscopy last 5 to 6 years ago.  He states they suggested he had a colonoscopy approximately every 3 years.  Reports daily bowel movements.  Reports that he has had 1 episode of bright red blood on tissue paper.  States he may have a hemorrhoid.  Denies any known family history of colon cancer.  Denies any changes in his bowel habits.  He also denies any unintentional weight loss. Patient does not take any blood thinning medications.    The following portions of the patient's history were reviewed and updated as appropriate: allergies, current medications, past family history, past medical history, past social history, past surgical history and problem list.    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)    • Hypercholesterolemia    • Hypertension    • Type 2 diabetes mellitus (HCC) 2017       Social History:  Social History     Socioeconomic History   • Marital status:    Tobacco Use   • Smoking status: Former     Packs/day: 0.25     Years: 5.00     Pack years: 1.25     Types: Cigarettes, Electronic Cigarette     Start date: 12/20/2000     Quit date: 1/1/2008     Years since quitting: 15.1   • Smokeless tobacco: Never   Vaping Use   • Vaping Use: Former   Substance and Sexual Activity   • Alcohol use: Yes     Alcohol/week: 10.0 standard drinks     Types: 10 Cans of beer per week     Comment: occ   • Drug use: Never   • Sexual activity: Yes     Partners: Male     Birth control/protection: I.U.D.       Family  History:  Family History   Problem Relation Age of Onset   • Diabetes Father    • Diabetes Maternal Grandmother        Past Surgical History:  Past Surgical History:   Procedure Laterality Date   • APPENDECTOMY     • LASIK         Problem List:  Patient Active Problem List   Diagnosis   • Type 2 diabetes mellitus (HCC)   • Hypercholesterolemia   • Essential hypertension       Allergy:   No Known Allergies     Current Medications:   Current Outpatient Medications   Medication Sig Dispense Refill   • dapagliflozin Propanediol (Farxiga) 10 MG tablet Take 1 tablet by mouth Daily. 90 tablet 1   • hydrOXYzine (ATARAX) 10 MG tablet Take 1 tablet by mouth Every 8 (Eight) Hours As Needed for Anxiety. 90 tablet 1   • ibuprofen (ADVIL,MOTRIN) 800 MG tablet Take 1 tablet by mouth Every 8 (Eight) Hours As Needed (pain). 20 tablet 1   • Lancets (OneTouch Delica Plus [id number]) misc Use to test blood sugar twice daily 100 each 5   • lisinopril (PRINIVIL,ZESTRIL) 5 MG tablet Take 1 tablet by mouth Daily. 90 tablet 1   • metFORMIN ER (GLUCOPHAGE-XR) 500 MG 24 hr tablet Take 2 tablets by mouth 2 (Two) Times a Day for 30 days. 360 tablet 1   • OneTouch Verio test strip Use to test blood sugar once daily (Patient taking differently: Use to test blood sugar once daily) 100 each 3   • bisacodyl (Dulcolax) 5 MG EC tablet Take 1 tablet by mouth Take As Directed. 4 tablet 0   • polyethylene glycol (MIRALAX) 17 GM/SCOOP powder Take by mouth as directed for colonosocpy. 238 g 0     No current facility-administered medications for this visit.       Review of Systems:    Review of Systems   Constitutional: Negative for activity change.   HENT: Negative for congestion.    Eyes: Negative for blurred vision.   Respiratory: Negative for shortness of breath.    Cardiovascular: Negative for chest pain.   Gastrointestinal: Positive for anal bleeding. Negative for abdominal pain and blood in stool.   Endocrine: Negative for cold intolerance.  "  Genitourinary: Negative for flank pain.   Musculoskeletal: Negative for arthralgias.   Skin: Negative for bruise.   Allergic/Immunologic: Negative for environmental allergies.   Neurological: Negative for confusion.   Hematological: Negative for adenopathy.   Psychiatric/Behavioral: Negative for agitation.         Physical Exam:   Physical Exam  Constitutional:       Appearance: Normal appearance.   HENT:      Head: Normocephalic and atraumatic.      Right Ear: External ear normal.      Left Ear: External ear normal.   Eyes:      Conjunctiva/sclera: Conjunctivae normal.      Pupils: Pupils are equal, round, and reactive to light.   Cardiovascular:      Rate and Rhythm: Normal rate and regular rhythm.   Pulmonary:      Effort: Pulmonary effort is normal.   Abdominal:      General: Abdomen is flat.   Musculoskeletal:         General: Normal range of motion.      Cervical back: Normal range of motion and neck supple.   Skin:     General: Skin is warm and dry.      Capillary Refill: Capillary refill takes less than 2 seconds.   Neurological:      General: No focal deficit present.      Mental Status: He is alert and oriented to person, place, and time.   Psychiatric:         Mood and Affect: Mood normal.         Behavior: Behavior normal.         Vitals:  Blood pressure 118/78, pulse 100, height 190.5 cm (75\"), weight 90.3 kg (199 lb).   Body mass index is 24.87 kg/m².        Assessment & Plan   Diagnoses and all orders for this visit:    1. Screening for colon cancer (Primary)  -     Case Request; Standing  -     Case Request    2. History of colonic polyps    Other orders  -     polyethylene glycol (MIRALAX) 17 GM/SCOOP powder; Take by mouth as directed for colonosocpy.  Dispense: 238 g; Refill: 0  -     bisacodyl (Dulcolax) 5 MG EC tablet; Take 1 tablet by mouth Take As Directed.  Dispense: 4 tablet; Refill: 0  -     Follow Anesthesia Guidelines / Protocol; Future  -     Obtain Informed Consent; Future  -     " Provide NPO Instructions to Patient; Future  -     Chlorhexidine Skin Prep; Future  -     Follow Anesthesia Guidelines / Protocol; Standing  -     Verify / Perform Chlorhexidine Skin Prep; Standing  -     Verify / Perform Chlorhexidine Skin Prep if Indicated (If Not Already Completed); Standing        Visit Diagnoses:    ICD-10-CM ICD-9-CM   1. Screening for colon cancer  Z12.11 V76.51   2. History of colonic polyps  Z86.010 V12.72     David is a 42-year-old male who presents for screening colonoscopy.  He will undergo a colonoscopy with Dr. Garnett.  He verbalized understanding of prep instructions and procedure and wishes to proceed.      MEDS ORDERED DURING VISIT:  New Medications Ordered This Visit   Medications   • polyethylene glycol (MIRALAX) 17 GM/SCOOP powder     Sig: Take by mouth as directed for colonosocpy.     Dispense:  238 g     Refill:  0   • bisacodyl (Dulcolax) 5 MG EC tablet     Sig: Take 1 tablet by mouth Take As Directed.     Dispense:  4 tablet     Refill:  0       Return for Follow up post op.             This document has been electronically signed by AKIL Jackson  March 10, 2023 12:09 EST    Please note that portions of this note were completed with a voice recognition program.

## 2023-03-10 NOTE — PROGRESS NOTES
Subjective   David Sanchez is a 42 y.o. male who presents today for Initial Evaluation    Chief Complaint:    Chief Complaint   Patient presents with   • Colonoscopy        History of Present Illness:    History of Present Illness David is a 42-year-old male who presents for screening colonoscopy.  He reports that he has had a colonoscopy in the past and has a personal history of colonic polyps.  Reports that he had a colonoscopy last 5 to 6 years ago.  He states they suggested he had a colonoscopy approximately every 3 years.  Reports daily bowel movements.  Reports that he has had 1 episode of bright red blood on tissue paper.  States he may have a hemorrhoid.  Denies any known family history of colon cancer.  Denies any changes in his bowel habits.  He also denies any unintentional weight loss. Patient does not take any blood thinning medications.    The following portions of the patient's history were reviewed and updated as appropriate: allergies, current medications, past family history, past medical history, past social history, past surgical history and problem list.    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)    • Hypercholesterolemia    • Hypertension    • Type 2 diabetes mellitus (HCC) 2017       Social History:  Social History     Socioeconomic History   • Marital status:    Tobacco Use   • Smoking status: Former     Packs/day: 0.25     Years: 5.00     Pack years: 1.25     Types: Cigarettes, Electronic Cigarette     Start date: 12/20/2000     Quit date: 1/1/2008     Years since quitting: 15.1   • Smokeless tobacco: Never   Vaping Use   • Vaping Use: Former   Substance and Sexual Activity   • Alcohol use: Yes     Alcohol/week: 10.0 standard drinks     Types: 10 Cans of beer per week     Comment: occ   • Drug use: Never   • Sexual activity: Yes     Partners: Male     Birth control/protection: I.U.D.       Family  History:  Family History   Problem Relation Age of Onset   • Diabetes Father    • Diabetes Maternal Grandmother        Past Surgical History:  Past Surgical History:   Procedure Laterality Date   • APPENDECTOMY     • LASIK         Problem List:  Patient Active Problem List   Diagnosis   • Type 2 diabetes mellitus (HCC)   • Hypercholesterolemia   • Essential hypertension       Allergy:   No Known Allergies     Current Medications:   Current Outpatient Medications   Medication Sig Dispense Refill   • dapagliflozin Propanediol (Farxiga) 10 MG tablet Take 1 tablet by mouth Daily. 90 tablet 1   • hydrOXYzine (ATARAX) 10 MG tablet Take 1 tablet by mouth Every 8 (Eight) Hours As Needed for Anxiety. 90 tablet 1   • ibuprofen (ADVIL,MOTRIN) 800 MG tablet Take 1 tablet by mouth Every 8 (Eight) Hours As Needed (pain). 20 tablet 1   • Lancets (OneTouch Delica Plus [id number]) misc Use to test blood sugar twice daily 100 each 5   • lisinopril (PRINIVIL,ZESTRIL) 5 MG tablet Take 1 tablet by mouth Daily. 90 tablet 1   • metFORMIN ER (GLUCOPHAGE-XR) 500 MG 24 hr tablet Take 2 tablets by mouth 2 (Two) Times a Day for 30 days. 360 tablet 1   • OneTouch Verio test strip Use to test blood sugar once daily (Patient taking differently: Use to test blood sugar once daily) 100 each 3   • bisacodyl (Dulcolax) 5 MG EC tablet Take 1 tablet by mouth Take As Directed. 4 tablet 0   • polyethylene glycol (MIRALAX) 17 GM/SCOOP powder Take by mouth as directed for colonosocpy. 238 g 0     No current facility-administered medications for this visit.       Review of Systems:    Review of Systems   Constitutional: Negative for activity change.   HENT: Negative for congestion.    Eyes: Negative for blurred vision.   Respiratory: Negative for shortness of breath.    Cardiovascular: Negative for chest pain.   Gastrointestinal: Positive for anal bleeding. Negative for abdominal pain and blood in stool.   Endocrine: Negative for cold intolerance.  "  Genitourinary: Negative for flank pain.   Musculoskeletal: Negative for arthralgias.   Skin: Negative for bruise.   Allergic/Immunologic: Negative for environmental allergies.   Neurological: Negative for confusion.   Hematological: Negative for adenopathy.   Psychiatric/Behavioral: Negative for agitation.         Physical Exam:   Physical Exam  Constitutional:       Appearance: Normal appearance.   HENT:      Head: Normocephalic and atraumatic.      Right Ear: External ear normal.      Left Ear: External ear normal.   Eyes:      Conjunctiva/sclera: Conjunctivae normal.      Pupils: Pupils are equal, round, and reactive to light.   Cardiovascular:      Rate and Rhythm: Normal rate and regular rhythm.   Pulmonary:      Effort: Pulmonary effort is normal.   Abdominal:      General: Abdomen is flat.   Musculoskeletal:         General: Normal range of motion.      Cervical back: Normal range of motion and neck supple.   Skin:     General: Skin is warm and dry.      Capillary Refill: Capillary refill takes less than 2 seconds.   Neurological:      General: No focal deficit present.      Mental Status: He is alert and oriented to person, place, and time.   Psychiatric:         Mood and Affect: Mood normal.         Behavior: Behavior normal.         Vitals:  Blood pressure 118/78, pulse 100, height 190.5 cm (75\"), weight 90.3 kg (199 lb).   Body mass index is 24.87 kg/m².        Assessment & Plan   Diagnoses and all orders for this visit:    1. Screening for colon cancer (Primary)  -     Case Request; Standing  -     Case Request    2. History of colonic polyps    Other orders  -     polyethylene glycol (MIRALAX) 17 GM/SCOOP powder; Take by mouth as directed for colonosocpy.  Dispense: 238 g; Refill: 0  -     bisacodyl (Dulcolax) 5 MG EC tablet; Take 1 tablet by mouth Take As Directed.  Dispense: 4 tablet; Refill: 0  -     Follow Anesthesia Guidelines / Protocol; Future  -     Obtain Informed Consent; Future  -     " Provide NPO Instructions to Patient; Future  -     Chlorhexidine Skin Prep; Future  -     Follow Anesthesia Guidelines / Protocol; Standing  -     Verify / Perform Chlorhexidine Skin Prep; Standing  -     Verify / Perform Chlorhexidine Skin Prep if Indicated (If Not Already Completed); Standing        Visit Diagnoses:    ICD-10-CM ICD-9-CM   1. Screening for colon cancer  Z12.11 V76.51   2. History of colonic polyps  Z86.010 V12.72     David is a 42-year-old male who presents for screening colonoscopy.  He will undergo a colonoscopy with Dr. Garnett.  He verbalized understanding of prep instructions and procedure and wishes to proceed.      MEDS ORDERED DURING VISIT:  New Medications Ordered This Visit   Medications   • polyethylene glycol (MIRALAX) 17 GM/SCOOP powder     Sig: Take by mouth as directed for colonosocpy.     Dispense:  238 g     Refill:  0   • bisacodyl (Dulcolax) 5 MG EC tablet     Sig: Take 1 tablet by mouth Take As Directed.     Dispense:  4 tablet     Refill:  0       Return for Follow up post op.             This document has been electronically signed by AKIL Jackson  March 10, 2023 12:09 EST    Please note that portions of this note were completed with a voice recognition program.

## 2023-03-13 ENCOUNTER — SPECIALTY PHARMACY (OUTPATIENT)
Dept: PHARMACY | Facility: HOSPITAL | Age: 43
End: 2023-03-13
Payer: COMMERCIAL

## 2023-03-17 ENCOUNTER — OFFICE VISIT (OUTPATIENT)
Dept: FAMILY MEDICINE CLINIC | Facility: CLINIC | Age: 43
End: 2023-03-17
Payer: COMMERCIAL

## 2023-03-17 VITALS
HEART RATE: 66 BPM | HEIGHT: 75 IN | BODY MASS INDEX: 24.79 KG/M2 | SYSTOLIC BLOOD PRESSURE: 128 MMHG | WEIGHT: 199.4 LBS | TEMPERATURE: 97.7 F | DIASTOLIC BLOOD PRESSURE: 82 MMHG | OXYGEN SATURATION: 97 %

## 2023-03-17 DIAGNOSIS — N52.8 OTHER MALE ERECTILE DYSFUNCTION: ICD-10-CM

## 2023-03-17 DIAGNOSIS — N52.8 OTHER MALE ERECTILE DYSFUNCTION: Primary | ICD-10-CM

## 2023-03-17 PROCEDURE — 99213 OFFICE O/P EST LOW 20 MIN: CPT | Performed by: FAMILY MEDICINE

## 2023-03-17 RX ORDER — SILDENAFIL CITRATE 20 MG/1
20 TABLET ORAL DAILY PRN
Qty: 60 TABLET | Refills: 0 | Status: SHIPPED | OUTPATIENT
Start: 2023-03-17 | End: 2023-03-24

## 2023-03-17 RX ORDER — SILDENAFIL CITRATE 20 MG/1
20 TABLET ORAL DAILY PRN
Qty: 60 TABLET | Refills: 0 | OUTPATIENT
Start: 2023-03-17

## 2023-03-17 NOTE — TELEPHONE ENCOUNTER
Caller: Mara Sanchez    Relationship: Emergency Contact    Best call back number: [phone]    What test/procedure requested: PRE-AUTHORIZATION FOR SILDENAFIL 20MG     When is it needed: AS SOON AS POSSIBLE      Additional information or concerns: PATIENTS WIFE WOULD LIKE TO BE CONTACTED WHEN THIS HAS BEEN DONE PLEASE ADVISE

## 2023-03-17 NOTE — TELEPHONE ENCOUNTER
Caller: Mara Sanchez    Relationship: Emergency Contact    Best call back number: [phone]    What test/procedure requested: PRE-AUTHORIZATION FOR SILDENAFIL 20MG     When is it needed: AS SOON AS POSSIBLE      Additional information or concerns: PATIENTS WIFE WOULD LIKE TO BE CONTACTED WHEN THIS HAS BEEN DONE PLEASE ADVISE       Left a detailed message.

## 2023-03-17 NOTE — PROGRESS NOTES
Subjective   David Sanchez is a 42 y.o. male.   Pt presents today with CC of Diabetes      History of Present Illness   History of Present Illness  Patient is a 42-year-old diabetic male who reports difficulty maintaining erection starting about 6 months ago.  He tried a friend sildenafil with good result.  He would like a prescription.       The following portions of the patient's history were reviewed and updated as appropriate: allergies, current medications, past family history, past medical history, past social history, past surgical history and problem list.    Review of Systems   Constitutional: Negative for chills, fever and unexpected weight loss.   HENT: Negative for congestion and sore throat.    Eyes: Negative for blurred vision and visual disturbance.   Respiratory: Negative for cough and wheezing.    Cardiovascular: Negative for chest pain and palpitations.   Gastrointestinal: Negative for abdominal pain and diarrhea.   Endocrine: Negative for cold intolerance and heat intolerance.   Genitourinary: Positive for erectile dysfunction. Negative for dysuria.   Musculoskeletal: Negative for arthralgias and neck stiffness.   Neurological: Negative for dizziness, seizures and syncope.   Psychiatric/Behavioral: Negative for self-injury, suicidal ideas and depressed mood.       Objective   Physical Exam  Vitals and nursing note reviewed.   Constitutional:       Appearance: Normal appearance.   Cardiovascular:      Rate and Rhythm: Normal rate and regular rhythm.   Pulmonary:      Effort: No respiratory distress.   Genitourinary:     Comments: Not examined  Neurological:      Mental Status: He is alert.           Assessment & Plan   Diagnoses and all orders for this visit:    1. Other male erectile dysfunction (Primary)  -     sildenafil (REVATIO) 20 MG tablet; Take 1 tablet by mouth Daily As Needed (ED).  Dispense: 60 tablet; Refill: 0    He has done well with this medication in the past.  We discussed side  effects of this medication, we discussed priapism and use with nitrates.  He had no further questions.  He is can take this medication as needed.                BMI is within normal parameters. No other follow-up for BMI required.          This document has been electronically signed by Mitch Reed DO  March 17, 2023 13:40 EDT    Dictated Utilizing Dragon Dictation: Part of this note may be an electronic transcription/translation of spoken language to printed text using the Dragon Dictation System.    Answers for HPI/ROS submitted by the patient on 3/17/2023  What is the primary reason for your visit?: Other  Please describe your symptoms.: Medicine  Have you had these symptoms before?: No  How long have you been having these symptoms?: Greater than 2 weeks

## 2023-03-21 ENCOUNTER — TELEPHONE (OUTPATIENT)
Dept: FAMILY MEDICINE CLINIC | Facility: CLINIC | Age: 43
End: 2023-03-21
Payer: COMMERCIAL

## 2023-03-24 DIAGNOSIS — N52.8 OTHER MALE ERECTILE DYSFUNCTION: Primary | ICD-10-CM

## 2023-03-24 RX ORDER — SILDENAFIL 50 MG/1
25 TABLET, FILM COATED ORAL DAILY PRN
Qty: 30 TABLET | Refills: 0 | Status: SHIPPED | OUTPATIENT
Start: 2023-03-24

## 2023-03-24 NOTE — TELEPHONE ENCOUNTER
03/24/2023 PA for sildenafil (Viagra) 50 MG tablet was submitted and denied, patient's insurance stated:This medication excluded from the patient's benefit, Drug is not covered by plan  Please advise

## 2023-03-24 NOTE — TELEPHONE ENCOUNTER
03/24/2023 PA for Sildenafil (Revatio) 20MG was denied.   Patient's insurance stated reason for denial: Coverage is provided in situations where the requested medication is being used for the treatment of pulmonary arterial hypertension.  Please advise

## 2023-03-27 PROBLEM — Z12.11 SCREENING FOR COLON CANCER: Status: ACTIVE | Noted: 2023-03-27

## 2023-03-30 ENCOUNTER — ANESTHESIA EVENT (OUTPATIENT)
Dept: PERIOP | Facility: HOSPITAL | Age: 43
End: 2023-03-30
Payer: COMMERCIAL

## 2023-03-30 ENCOUNTER — SPECIALTY PHARMACY (OUTPATIENT)
Dept: PHARMACY | Facility: HOSPITAL | Age: 43
End: 2023-03-30
Payer: COMMERCIAL

## 2023-03-31 ENCOUNTER — ANESTHESIA (OUTPATIENT)
Dept: PERIOP | Facility: HOSPITAL | Age: 43
End: 2023-03-31
Payer: COMMERCIAL

## 2023-03-31 ENCOUNTER — HOSPITAL ENCOUNTER (OUTPATIENT)
Facility: HOSPITAL | Age: 43
Setting detail: HOSPITAL OUTPATIENT SURGERY
Discharge: HOME | End: 2023-03-31
Attending: SURGERY | Admitting: SURGERY
Payer: COMMERCIAL

## 2023-03-31 VITALS
WEIGHT: 195 LBS | RESPIRATION RATE: 20 BRPM | OXYGEN SATURATION: 97 % | SYSTOLIC BLOOD PRESSURE: 128 MMHG | BODY MASS INDEX: 24.25 KG/M2 | HEIGHT: 75 IN | TEMPERATURE: 97.5 F | DIASTOLIC BLOOD PRESSURE: 90 MMHG | HEART RATE: 105 BPM

## 2023-03-31 PROCEDURE — 25010000002 FENTANYL CITRATE (PF) 50 MCG/ML SOLUTION: Performed by: NURSE ANESTHETIST, CERTIFIED REGISTERED

## 2023-03-31 PROCEDURE — 25010000002 PROPOFOL 10 MG/ML EMULSION: Performed by: NURSE ANESTHETIST, CERTIFIED REGISTERED

## 2023-03-31 PROCEDURE — 25010000002 MIDAZOLAM PER 1 MG: Performed by: NURSE ANESTHETIST, CERTIFIED REGISTERED

## 2023-03-31 RX ORDER — SODIUM CHLORIDE 0.9 % (FLUSH) 0.9 %
10 SYRINGE (ML) INJECTION EVERY 12 HOURS SCHEDULED
Status: DISCONTINUED | OUTPATIENT
Start: 2023-03-31 | End: 2023-03-31 | Stop reason: HOSPADM

## 2023-03-31 RX ORDER — IPRATROPIUM BROMIDE AND ALBUTEROL SULFATE 2.5; .5 MG/3ML; MG/3ML
3 SOLUTION RESPIRATORY (INHALATION) ONCE AS NEEDED
Status: DISCONTINUED | OUTPATIENT
Start: 2023-03-31 | End: 2023-03-31 | Stop reason: HOSPADM

## 2023-03-31 RX ORDER — SODIUM CHLORIDE 0.9 % (FLUSH) 0.9 %
10 SYRINGE (ML) INJECTION AS NEEDED
Status: DISCONTINUED | OUTPATIENT
Start: 2023-03-31 | End: 2023-03-31 | Stop reason: HOSPADM

## 2023-03-31 RX ORDER — FENTANYL CITRATE 50 UG/ML
INJECTION, SOLUTION INTRAMUSCULAR; INTRAVENOUS AS NEEDED
Status: DISCONTINUED | OUTPATIENT
Start: 2023-03-31 | End: 2023-03-31 | Stop reason: SURG

## 2023-03-31 RX ORDER — PROPOFOL 10 MG/ML
VIAL (ML) INTRAVENOUS AS NEEDED
Status: DISCONTINUED | OUTPATIENT
Start: 2023-03-31 | End: 2023-03-31 | Stop reason: SURG

## 2023-03-31 RX ORDER — MIDAZOLAM HYDROCHLORIDE 1 MG/ML
1 INJECTION INTRAMUSCULAR; INTRAVENOUS
Status: DISCONTINUED | OUTPATIENT
Start: 2023-03-31 | End: 2023-03-31 | Stop reason: HOSPADM

## 2023-03-31 RX ORDER — MEPERIDINE HYDROCHLORIDE 25 MG/ML
12.5 INJECTION INTRAMUSCULAR; INTRAVENOUS; SUBCUTANEOUS
Status: DISCONTINUED | OUTPATIENT
Start: 2023-03-31 | End: 2023-03-31 | Stop reason: HOSPADM

## 2023-03-31 RX ORDER — OXYCODONE HYDROCHLORIDE AND ACETAMINOPHEN 5; 325 MG/1; MG/1
1 TABLET ORAL ONCE AS NEEDED
Status: DISCONTINUED | OUTPATIENT
Start: 2023-03-31 | End: 2023-03-31 | Stop reason: HOSPADM

## 2023-03-31 RX ORDER — FENTANYL CITRATE 50 UG/ML
50 INJECTION, SOLUTION INTRAMUSCULAR; INTRAVENOUS
Status: DISCONTINUED | OUTPATIENT
Start: 2023-03-31 | End: 2023-03-31 | Stop reason: HOSPADM

## 2023-03-31 RX ORDER — SODIUM CHLORIDE, SODIUM LACTATE, POTASSIUM CHLORIDE, CALCIUM CHLORIDE 600; 310; 30; 20 MG/100ML; MG/100ML; MG/100ML; MG/100ML
125 INJECTION, SOLUTION INTRAVENOUS ONCE
Status: COMPLETED | OUTPATIENT
Start: 2023-03-31 | End: 2023-03-31

## 2023-03-31 RX ORDER — SODIUM CHLORIDE, SODIUM LACTATE, POTASSIUM CHLORIDE, CALCIUM CHLORIDE 600; 310; 30; 20 MG/100ML; MG/100ML; MG/100ML; MG/100ML
100 INJECTION, SOLUTION INTRAVENOUS ONCE AS NEEDED
Status: DISCONTINUED | OUTPATIENT
Start: 2023-03-31 | End: 2023-03-31 | Stop reason: HOSPADM

## 2023-03-31 RX ORDER — ONDANSETRON 2 MG/ML
4 INJECTION INTRAMUSCULAR; INTRAVENOUS AS NEEDED
Status: DISCONTINUED | OUTPATIENT
Start: 2023-03-31 | End: 2023-03-31 | Stop reason: HOSPADM

## 2023-03-31 RX ORDER — MIDAZOLAM HYDROCHLORIDE 1 MG/ML
INJECTION INTRAMUSCULAR; INTRAVENOUS AS NEEDED
Status: DISCONTINUED | OUTPATIENT
Start: 2023-03-31 | End: 2023-03-31 | Stop reason: SURG

## 2023-03-31 RX ORDER — SODIUM CHLORIDE 9 MG/ML
40 INJECTION, SOLUTION INTRAVENOUS AS NEEDED
Status: DISCONTINUED | OUTPATIENT
Start: 2023-03-31 | End: 2023-03-31 | Stop reason: HOSPADM

## 2023-03-31 RX ADMIN — PROPOFOL 160 MCG/KG/MIN: 10 INJECTION, EMULSION INTRAVENOUS at 08:19

## 2023-03-31 RX ADMIN — PROPOFOL 150 MG: 10 INJECTION, EMULSION INTRAVENOUS at 08:19

## 2023-03-31 RX ADMIN — FENTANYL CITRATE 100 MCG: 50 INJECTION INTRAMUSCULAR; INTRAVENOUS at 08:19

## 2023-03-31 RX ADMIN — MIDAZOLAM HYDROCHLORIDE 2 MG: 1 INJECTION, SOLUTION INTRAMUSCULAR; INTRAVENOUS at 08:17

## 2023-03-31 RX ADMIN — SODIUM CHLORIDE, POTASSIUM CHLORIDE, SODIUM LACTATE AND CALCIUM CHLORIDE: 600; 310; 30; 20 INJECTION, SOLUTION INTRAVENOUS at 08:17

## 2023-03-31 NOTE — ANESTHESIA POSTPROCEDURE EVALUATION
Patient: David Sanchez    Procedure Summary     Date: 03/31/23 Room / Location: [facility] OR  /  [facility] OR    Anesthesia Start: 0817 Anesthesia Stop: 0833    Procedure: COLONOSCOPY Diagnosis:       Screening for colon cancer      (Screening for colon cancer [Z12.11])    Surgeons: Satnam Garnett MD Provider: Alen Zarco MD    Anesthesia Type: general ASA Status: 2          Anesthesia Type: general    Vitals  Vitals Value Taken Time   /90 03/31/23 0905   Temp 97.5 °F (36.4 °C) 03/31/23 0835   Pulse 105 03/31/23 0905   Resp 20 03/31/23 0905   SpO2 97 % 03/31/23 0905           Post Anesthesia Care and Evaluation    Patient location during evaluation: PACU  Patient participation: complete - patient participated  Level of consciousness: awake  Pain score: 1  Pain management: adequate    Airway patency: patent  Anesthetic complications: No anesthetic complications  PONV Status: controlled  Cardiovascular status: acceptable and blood pressure returned to baseline  Respiratory status: acceptable and room air  Hydration status: acceptable    Comments: Patient comfortable with discharge at this time.

## 2023-03-31 NOTE — ANESTHESIA PREPROCEDURE EVALUATION
Anesthesia Evaluation     Patient summary reviewed and Nursing notes reviewed   no history of anesthetic complications:  NPO Solid Status: > 8 hours  NPO Liquid Status: > 8 hours           Airway   Mallampati: II  TM distance: >3 FB  Neck ROM: full  No difficulty expected  Dental - normal exam     Pulmonary - negative pulmonary ROS and normal exam    breath sounds clear to auscultation  (-) not a smoker  Cardiovascular - normal exam    Rhythm: regular  Rate: normal    (+) hypertension, hyperlipidemia,   (-) past MI      Neuro/Psych- negative ROS  (-) seizures, CVA  GI/Hepatic/Renal/Endo    (+)   diabetes mellitus type 2,     Musculoskeletal (-) negative ROS    Abdominal  - normal exam   Substance History - negative use     OB/GYN negative ob/gyn ROS         Other - negative ROS                     Anesthesia Plan    ASA 2     general   total IV anesthesia  intravenous induction     Anesthetic plan, risks, benefits, and alternatives have been provided, discussed and informed consent has been obtained with: patient.        CODE STATUS:

## 2023-03-31 NOTE — INTERVAL H&P NOTE
H&P reviewed.  The patient was examined and there are no changes to the H&P.  To endoscopy for colonoscopy

## 2023-04-21 ENCOUNTER — SPECIALTY PHARMACY (OUTPATIENT)
Dept: PHARMACY | Facility: HOSPITAL | Age: 43
End: 2023-04-21
Payer: COMMERCIAL

## 2023-04-21 RX ORDER — IBUPROFEN 800 MG/1
800 TABLET ORAL EVERY 8 HOURS PRN
Qty: 20 TABLET | Refills: 1 | Status: SHIPPED | OUTPATIENT
Start: 2023-04-21

## 2023-05-10 ENCOUNTER — SPECIALTY PHARMACY (OUTPATIENT)
Dept: PHARMACY | Facility: HOSPITAL | Age: 43
End: 2023-05-10
Payer: COMMERCIAL

## 2023-05-30 ENCOUNTER — SPECIALTY PHARMACY (OUTPATIENT)
Dept: PHARMACY | Facility: HOSPITAL | Age: 43
End: 2023-05-30

## 2023-07-28 ENCOUNTER — OFFICE VISIT (OUTPATIENT)
Dept: ENDOCRINOLOGY | Facility: CLINIC | Age: 43
End: 2023-07-28
Payer: COMMERCIAL

## 2023-07-28 ENCOUNTER — SPECIALTY PHARMACY (OUTPATIENT)
Dept: PHARMACY | Facility: HOSPITAL | Age: 43
End: 2023-07-28
Payer: COMMERCIAL

## 2023-07-28 VITALS
HEIGHT: 75 IN | DIASTOLIC BLOOD PRESSURE: 70 MMHG | WEIGHT: 196.6 LBS | SYSTOLIC BLOOD PRESSURE: 118 MMHG | HEART RATE: 95 BPM | OXYGEN SATURATION: 98 % | BODY MASS INDEX: 24.45 KG/M2

## 2023-07-28 DIAGNOSIS — E11.65 TYPE 2 DIABETES MELLITUS WITH HYPERGLYCEMIA, WITHOUT LONG-TERM CURRENT USE OF INSULIN: Chronic | ICD-10-CM

## 2023-07-28 DIAGNOSIS — E11.65 TYPE 2 DIABETES MELLITUS WITH HYPERGLYCEMIA, WITHOUT LONG-TERM CURRENT USE OF INSULIN: Primary | ICD-10-CM

## 2023-07-28 LAB
EXPIRATION DATE: NORMAL
EXPIRATION DATE: NORMAL
GLUCOSE BLDC GLUCOMTR-MCNC: 128 MG/DL (ref 70–130)
HBA1C MFR BLD: 6.2 %
Lab: NORMAL
Lab: NORMAL

## 2023-07-28 RX ORDER — LISINOPRIL 5 MG/1
5 TABLET ORAL DAILY
Qty: 90 TABLET | Refills: 1 | Status: SHIPPED | OUTPATIENT
Start: 2023-07-28 | End: 2024-07-27

## 2023-07-28 RX ORDER — DAPAGLIFLOZIN 10 MG/1
10 TABLET, FILM COATED ORAL DAILY
Qty: 90 TABLET | Refills: 1 | Status: SHIPPED | OUTPATIENT
Start: 2023-07-28

## 2023-07-28 RX ORDER — METFORMIN HYDROCHLORIDE 500 MG/1
1000 TABLET, EXTENDED RELEASE ORAL 2 TIMES DAILY
Qty: 120 TABLET | Refills: 5 | Status: SHIPPED | OUTPATIENT
Start: 2023-07-28 | End: 2024-01-24

## 2023-07-28 NOTE — PROGRESS NOTES
Specialty Pharmacy Patient Management Program  Endocrinology Reassessment     David Sanchez is a 42 y.o. male with Type 2 Diabetes seen by an Endocrinology Provider and enrolled in the Endocrinology Patient Management program offered by [facility]. A follow-up was conducted, including assessment of continued therapy appropriateness, medication adherence, and side effect incidence and management for Metformin and Farxiga.     Patient is taking metformin ER 2,000 mg daily and Farxiga 10 mg daily to control BG. He reports tolerating the medications well, denies changes in medications, and denies missing any doses of his medications. He doesn't routinely monitor his BG at home. He denies any signs/symptoms of hypoglycemia at this time.     Changes to Insurance Coverage or Financial Support  None     Relevant Past Medical History and Comorbidities  Relevant medical history and concomitant health conditions were discussed with the patient. The patient's chart has been reviewed for relevant past medical history and comorbid health conditions and updated as necessary.   Past Medical History:   Diagnosis Date    Colon polyp     Disorder associated with type 2 diabetes mellitus     Elevated cholesterol     Hypercholesterolemia     Hypertension     Type 2 diabetes mellitus 2017     Social History     Socioeconomic History    Marital status:    Tobacco Use    Smoking status: Former     Packs/day: 0.25     Years: 5.00     Pack years: 1.25     Types: Cigarettes, Electronic Cigarette     Start date: 12/20/2000     Quit date: 1/1/2008     Years since quitting: 15.5    Smokeless tobacco: Never   Vaping Use    Vaping Use: Former   Substance and Sexual Activity    Alcohol use: Yes     Alcohol/week: 10.0 standard drinks     Types: 10 Cans of beer per week     Comment: occ    Drug use: Never    Sexual activity: Yes     Partners: Male     Birth control/protection: I.U.D.       Problem list reviewed by  Breanna Ramirez RPH on 7/28/2023 at  9:47 AM    Allergies  Known allergies and reactions were discussed with the patient. The patient's chart has been reviewed for allergy information and updated as necessary.   Patient has no known allergies.    Allergies reviewed by Breanna Ramirez RPH on 7/28/2023 at  9:46 AM    Relevant Laboratory Values  A1C Last 3 Results          10/14/2022    09:05 1/19/2023    08:37   HGBA1C Last 3 Results   Hemoglobin A1C 6.6  6.4      Lab Results   Component Value Date    HGBA1C 6.4 01/19/2023     Lab Results   Component Value Date    GLUCOSE 117 (H) 01/19/2023    CALCIUM 9.5 01/19/2023     01/19/2023    K 4.4 01/19/2023    CO2 27.0 01/19/2023     01/19/2023    BUN 18 01/19/2023    CREATININE 0.94 01/19/2023    BCR 19.1 01/19/2023    ANIONGAP 9.0 01/19/2023     Lab Results   Component Value Date    CHOL 201 (H) 01/19/2023    CHLPL 219 (H) 10/07/2022    TRIG 103 01/19/2023    HDL 43 01/19/2023     (H) 01/19/2023     Microalbumin          10/7/2022    10:05 1/19/2023    08:53   Microalbumin   Microalbumin, Urine 39.6  1.2        Current Medication List  This medication list has been reviewed with the patient and evaluated for any interactions or necessary modifications/recommendations, and updated to include all prescription medications, OTC medications, and supplements the patient is currently taking.  This list reflects what is contained in the patient's profile, which has also been marked as reviewed to communicate to other providers it is the most up to date version of the patient's current medication therapy.     Current Outpatient Medications:     dapagliflozin Propanediol (Farxiga) 10 MG tablet, Take 1 tablet by mouth Daily., Disp: 90 tablet, Rfl: 1    ibuprofen (ADVIL,MOTRIN) 800 MG tablet, Take 1 tablet by mouth Every 8 (Eight) Hours As Needed (pain)., Disp: 20 tablet, Rfl: 1    Lancets (OneTouch Delica Plus [id number]) misc, Use to test blood sugar twice daily,  Disp: 100 each, Rfl: 5    lisinopril (PRINIVIL,ZESTRIL) 5 MG tablet, Take 1 tablet by mouth Daily., Disp: 90 tablet, Rfl: 1    metFORMIN ER (GLUCOPHAGE-XR) 500 MG 24 hr tablet, Take 2 tablets by mouth 2 (Two) Times a Day for 180 days., Disp: 120 tablet, Rfl: 5    OneTouch Verio test strip, Use to test blood sugar once daily (Patient taking differently: Use to test blood sugar once daily), Disp: 100 each, Rfl: 3    sildenafil (Viagra) 50 MG tablet, Take 1/2 tablet by mouth Daily As Needed for Erectile Dysfunction., Disp: 30 tablet, Rfl: 0    hydrOXYzine (ATARAX) 10 MG tablet, Take 1 tablet by mouth Every 8 (Eight) Hours As Needed for Anxiety. (Patient not taking: Reported on 7/28/2023), Disp: 90 tablet, Rfl: 1    Medicines reviewed by Breanna Ramirez, [facility] on 7/28/2023 at  9:46 AM    Drug Interactions  Coadministration of metformin and NSAIDs may increase the risk of acute renal failure and lactic acidosis - we will continue to monitor kidney function (he only uses IBU PRN)  ACE inhibitors may enhance the adverse/toxic effect of metformin. This includes both a risk for hypoglycemia and for lactic acidosis.    Adverse Drug Reactions  Adverse Reactions Experienced: None  Plan for ADR Management: Not Required    Hospitalizations and Urgent Care Since Last Assessment  Hospitalizations or Admissions: None  ED Visits: None  Urgent Office Visits: None    Adherence and Self-Administration  Adherence related patient's specialty therapy was discussed with the patient. The Adherence segment of this outreach has been reviewed and updated.     Ongoing or New Barriers to Patient Adherence and/or Self-Administration: None   Methods for Supporting Patient Adherence and/or Self-Administration: None Required     Goals of Therapy  Goals related to the patient's specialty therapy was discussed with the patient. The Patient Goals segment of this outreach has been reviewed and updated.    Goals        Consistently take medications as  prescribed      HEMOGLOBIN A1C < 7      Specialty Pharmacy General Goal      Consistently take medications as prescribed              Reassessment Plan & Follow-Up  Patient's diabetes continues to improve with A1C down to 6.2% from 6.4%. No pharmacologic recommendations at this time.   Medication Therapy Changes: None today    Additional Plans, Therapy Recommendations, or Therapy Problems to Be Addressed:   Patient needs refills on all medications. Will send new RX to [facility] for Farxiga, Metformin, and Lisinopril.   Pharmacist to perform regular reassessments no more than (6) months from the previous assessment.  Care Coordinator to set up future refill outreaches, coordinate prescription delivery, and escalate clinical questions to pharmacist.     Attestation  I attest that the specialty medication(s) addressed above are appropriate for the patient based on my reassessment.  If the prescribed therapy is at any point deemed not appropriate based on the current or future assessments, a consultation will be initiated with the patient's specialty care provider to determine the best course of action. The revised plan of therapy will be documented along with any additional patient education provided.     Breanna Ramirez, PharmD, LISA MOTTA  Clinical Specialty Pharmacist, Endocrinology  7/28/2023  09:47 EDT

## 2023-07-28 NOTE — ASSESSMENT & PLAN NOTE
-Diabetes is improving with A1c down from 6.4 to 6.2.  -Discussed dietary and exercise guidelines.  -Discussed importance of yearly eye exams.  -Discussed importance of maintaining optimal blood sugars.  -Continue metformin 1000 mg twice daily.  -Continue Farxiga 10 mg daily. Discussed the medication's MOA and that it may cause more frequent urination particularly upon starting the medication. Take one tablet in the morning with or without food. Encouraged to drink plenty of water as to not get dehydrated especially in warmer weather or with activity. Also discussed there may be an increased incidence of UTIs or yeast infections and to monitor for signs/symptoms.  -Signs and symptoms of hypoglycemia reviewed with rule 15's advised.  -Follow-up in 6 months.

## 2023-07-28 NOTE — PROGRESS NOTES
Chief Complaint   Patient presents with    Diabetes        David Sanchez is a 42 y.o. male had concerns including Diabetes.    T2DM.     He is checking blood sugars rarely.  Current medications for diabetes include Metformin 1,000 mg BID, Farxiga 10 mg QD.  Most recent optho exam: is upcoming  Place of optho exam: [facility]  Hypos: none  He has not been following any dietary/exercise guidelines.  He does not plan to change this at this time.    He is on Lisinopril.      The following portions of the patient's history were reviewed and updated as appropriate: allergies, current medications, past family history, past medical history, past social history, past surgical history and problem list.      Review of Systems   Constitutional:  Positive for fatigue.   Eyes: Negative.    Endocrine: Negative for polydipsia, polyphagia and polyuria.   Musculoskeletal:  Positive for arthralgias.   Psychiatric/Behavioral:  Negative for sleep disturbance.    All other systems reviewed and are negative.     Physical Exam  Vitals reviewed.   Constitutional:       Appearance: Normal appearance.   Eyes:      Extraocular Movements: Extraocular movements intact.   Cardiovascular:      Rate and Rhythm: Normal rate.      Pulses:           Dorsalis pedis pulses are 2+ on the right side and 2+ on the left side.        Posterior tibial pulses are 2+ on the right side and 2+ on the left side.   Pulmonary:      Effort: Pulmonary effort is normal.   Feet:      Right foot:      Protective Sensation: 10 sites tested.  10 sites sensed.      Skin integrity: Skin integrity normal.      Toenail Condition: Right toenails are normal.      Left foot:      Protective Sensation: 10 sites tested.  10 sites sensed.      Skin integrity: Skin integrity normal.      Toenail Condition: Left toenails are normal.      Comments: Diabetic Foot Exam Performed and Monofilament Test Performed     Neurological:      Mental Status: He is alert and oriented to  "person, place, and time.   Psychiatric:         Mood and Affect: Mood normal.         Behavior: Behavior normal.         Thought Content: Thought content normal.         Judgment: Judgment normal.      /70 (BP Location: Right arm, Patient Position: Sitting, Cuff Size: Adult)   Pulse 95   Ht 190.5 cm (75\")   Wt 89.2 kg (196 lb 9.6 oz)   SpO2 98%   BMI 24.57 kg/m²      Labs and imaging    CMP:  Lab Results   Component Value Date    BUN 18 01/19/2023    CREATININE 0.94 01/19/2023    BCR 19.1 01/19/2023     01/19/2023    K 4.4 01/19/2023    CO2 27.0 01/19/2023    CALCIUM 9.5 01/19/2023    PROTENTOTREF 7.0 10/07/2022    ALBUMIN 4.9 01/19/2023    LABGLOBREF 2.3 10/07/2022    LABIL2 2.0 10/07/2022    BILITOT 0.7 01/19/2023    ALKPHOS 71 01/19/2023    AST 37 01/19/2023    ALT 68 (H) 01/19/2023     Lipid Panel:  Lab Results   Component Value Date    CHOL 201 (H) 01/19/2023    TRIG 103 01/19/2023    HDL 43 01/19/2023    VLDL 19 01/19/2023     (H) 01/19/2023     HbA1c:  Lab Results   Component Value Date    HGBA1C 6.2 07/28/2023    HGBA1C 6.4 01/19/2023     Glucose:      Lab Results   Component Value Date    POCGLU 128 07/28/2023     Microalbumin:  Lab Results   Component Value Date    MALBCRERATIO 5.5 01/19/2023     TSH:  Lab Results   Component Value Date    TSH 1.720 01/19/2023       Assessment and plan  Diagnoses and all orders for this visit:    1. Type 2 diabetes mellitus with hyperglycemia, without long-term current use of insulin (Primary)  Assessment & Plan:  -Diabetes is improving with A1c down from 6.4 to 6.2.  -Discussed dietary and exercise guidelines.  -Discussed importance of yearly eye exams.  -Discussed importance of maintaining optimal blood sugars.  -Continue metformin 1000 mg twice daily.  -Continue Farxiga 10 mg daily. Discussed the medication's MOA and that it may cause more frequent urination particularly upon starting the medication. Take one tablet in the morning with or without " food. Encouraged to drink plenty of water as to not get dehydrated especially in warmer weather or with activity. Also discussed there may be an increased incidence of UTIs or yeast infections and to monitor for signs/symptoms.  -Signs and symptoms of hypoglycemia reviewed with rule 15's advised.  -Follow-up in 6 months.    Orders:  -     POC Glucose, Blood  -     POC Glycosylated Hemoglobin (Hb A1C)         Return in about 6 months (around 1/28/2024) for Follow-up appointment, A1C. The patient was instructed to contact the clinic with any interval questions or concerns.    This document has been electronically signed by AKIL Byrne  July 28, 2023 09:54 EDT  Endocrinology

## 2023-08-04 ENCOUNTER — SPECIALTY PHARMACY (OUTPATIENT)
Dept: PHARMACY | Facility: HOSPITAL | Age: 43
End: 2023-08-04
Payer: COMMERCIAL

## 2023-08-25 ENCOUNTER — SPECIALTY PHARMACY (OUTPATIENT)
Dept: PHARMACY | Facility: HOSPITAL | Age: 43
End: 2023-08-25
Payer: COMMERCIAL

## 2023-08-25 RX ORDER — IBUPROFEN 800 MG/1
800 TABLET ORAL EVERY 8 HOURS PRN
Qty: 20 TABLET | Refills: 1 | Status: SHIPPED | OUTPATIENT
Start: 2023-08-25

## 2023-08-25 NOTE — PROGRESS NOTES
Specialty Pharmacy Refill Coordination Note      Name:  David Sanchez  :  1980  Date:  2023     Patient requested that I fill his Metformin and IBU as well. IBU was out of refills. I reached out to Nereida Paris about the IBU, which was last prescribed by Dr. Camacho and she authorized the refill.       Past Medical History:   Diagnosis Date    Colon polyp     Disorder associated with type 2 diabetes mellitus     Elevated cholesterol     Hypercholesterolemia     Hypertension     Type 2 diabetes mellitus        Past Surgical History:   Procedure Laterality Date    APPENDECTOMY      COLONOSCOPY N/A 3/31/2023    Procedure: COLONOSCOPY;  Surgeon: Satnam Garnett MD;  [facility];  Service: Gastroenterology;  Laterality: N/A;    EYE SURGERY      LASIK         Social History     Socioeconomic History    Marital status:    Tobacco Use    Smoking status: Former     Packs/day: 0.25     Years: 5.00     Pack years: 1.25     Types: Cigarettes, Electronic Cigarette     Start date: 2000     Quit date: 2008     Years since quitting: 15.6    Smokeless tobacco: Never   Vaping Use    Vaping Use: Former   Substance and Sexual Activity    Alcohol use: Yes     Alcohol/week: 10.0 standard drinks     Types: 10 Cans of beer per week     Comment: occ    Drug use: Never    Sexual activity: Yes     Partners: Male     Birth control/protection: I.U.D.       Family History   Problem Relation Age of Onset    Diabetes Father     Diabetes Maternal Grandmother        No Known Allergies    Current Outpatient Medications   Medication Sig Dispense Refill    ibuprofen (ADVIL,MOTRIN) 800 MG tablet Take 1 tablet by mouth Every 8 (Eight) Hours As Needed (pain). 20 tablet 1    dapagliflozin Propanediol (Farxiga) 10 MG tablet Take 1 tablet by mouth Daily. 90 tablet 1    hydrOXYzine (ATARAX) 10 MG tablet Take 1 tablet by mouth Every 8 (Eight) Hours As Needed for Anxiety. (Patient not taking: Reported on  7/28/2023) 90 tablet 1    Lancets (OneTouch Delica Plus [id number]) misc Use to test blood sugar twice daily 100 each 5    lisinopril (PRINIVIL,ZESTRIL) 5 MG tablet Take 1 tablet by mouth Daily. 90 tablet 1    metFORMIN ER (GLUCOPHAGE-XR) 500 MG 24 hr tablet Take 2 tablets by mouth 2 (Two) Times a Day for 180 days. 120 tablet 5    OneTouch Verio test strip Use to test blood sugar once daily (Patient taking differently: Use to test blood sugar once daily) 100 each 3    sildenafil (Viagra) 50 MG tablet Take 1/2 tablet by mouth Daily As Needed for Erectile Dysfunction. 30 tablet 0     No current facility-administered medications for this visit.         ASSESSMENT/PLAN:      David is a 42 y.o. male contacted today regarding refills of  Farxiga 10mg specialty medication(s).    Reviewed and verified with patient:       Specialty medication(s) and dose(s) confirmed: yes    Refill Questions      Flowsheet Row Most Recent Value   Changes to allergies? No   Changes to medications? No   New conditions since last clinic visit No   Unplanned office visit, urgent care, ED, or hospital admission in the last 4 weeks  No   How does patient/caregiver feel medication is working? Good   Financial problems or insurance changes  No   If yes, describe changes in insurance or financial issues. none   Since the previous refill, were any specialty medication doses or scheduled injections missed or delayed?  No   If yes, please provide the amount 0   Why were doses missed? na   Does this patient require a clinical escalation to a pharmacist? No                       Follow-up: 21 day(s)     Mari aR Bauman [facility]  Specialty Pharmacy Technician

## 2023-09-13 ENCOUNTER — SPECIALTY PHARMACY (OUTPATIENT)
Dept: PHARMACY | Facility: HOSPITAL | Age: 43
End: 2023-09-13
Payer: COMMERCIAL

## 2023-10-02 ENCOUNTER — SPECIALTY PHARMACY (OUTPATIENT)
Dept: PHARMACY | Facility: HOSPITAL | Age: 43
End: 2023-10-02
Payer: COMMERCIAL

## 2023-10-02 RX ORDER — IBUPROFEN 800 MG/1
800 TABLET ORAL EVERY 8 HOURS PRN
Qty: 20 TABLET | Refills: 1 | Status: SHIPPED | OUTPATIENT
Start: 2023-10-02

## 2023-10-23 ENCOUNTER — SPECIALTY PHARMACY (OUTPATIENT)
Dept: PHARMACY | Facility: HOSPITAL | Age: 43
End: 2023-10-23
Payer: COMMERCIAL

## 2023-11-10 ENCOUNTER — SPECIALTY PHARMACY (OUTPATIENT)
Dept: PHARMACY | Facility: HOSPITAL | Age: 43
End: 2023-11-10
Payer: COMMERCIAL

## 2023-11-29 ENCOUNTER — SPECIALTY PHARMACY (OUTPATIENT)
Dept: PHARMACY | Facility: HOSPITAL | Age: 43
End: 2023-11-29
Payer: COMMERCIAL

## 2023-12-19 ENCOUNTER — SPECIALTY PHARMACY (OUTPATIENT)
Dept: PHARMACY | Facility: HOSPITAL | Age: 43
End: 2023-12-19
Payer: COMMERCIAL

## 2024-01-09 ENCOUNTER — SPECIALTY PHARMACY (OUTPATIENT)
Dept: PHARMACY | Facility: HOSPITAL | Age: 44
End: 2024-01-09
Payer: COMMERCIAL

## 2024-01-09 DIAGNOSIS — E11.65 TYPE 2 DIABETES MELLITUS WITH HYPERGLYCEMIA, WITHOUT LONG-TERM CURRENT USE OF INSULIN: Chronic | ICD-10-CM

## 2024-01-09 RX ORDER — IBUPROFEN 800 MG/1
800 TABLET ORAL EVERY 8 HOURS PRN
Qty: 20 TABLET | Refills: 1 | Status: SHIPPED | OUTPATIENT
Start: 2024-01-09

## 2024-01-09 RX ORDER — LISINOPRIL 5 MG/1
5 TABLET ORAL DAILY
Qty: 21 TABLET | Refills: 7 | Status: SHIPPED | OUTPATIENT
Start: 2024-01-09 | End: 2025-01-08

## 2024-01-09 RX ORDER — METFORMIN HYDROCHLORIDE 500 MG/1
1000 TABLET, EXTENDED RELEASE ORAL 2 TIMES DAILY
Qty: 84 TABLET | Refills: 7 | Status: SHIPPED | OUTPATIENT
Start: 2024-01-09

## 2024-01-09 NOTE — PROGRESS NOTES
Specialty Pharmacy Patient Management Program  Endocrinology Reassessment     David Sanchez is a 43 y.o. male with Type 2 Diabetes seen by an Endocrinology Provider and enrolled in the Endocrinology Patient Management program offered by [facility]. A follow-up was conducted, including assessment of continued therapy appropriateness, medication adherence, and side effect incidence and management for Metformin and Farxiga.     Patient is taking metformin ER 2,000 mg daily and Farxiga 10 mg daily to control BG. He reports tolerating the medications well, denies changes in medications, and denies missing any doses of his medications. He doesn't routinely monitor his BG at home. He denies any signs/symptoms of hypoglycemia at this time. He needs refills on medications today.     Changes to Insurance Coverage or Financial Support  None     Relevant Past Medical History and Comorbidities  Relevant medical history and concomitant health conditions were discussed with the patient. The patient's chart has been reviewed for relevant past medical history and comorbid health conditions and updated as necessary.   Past Medical History:   Diagnosis Date    Colon polyp     Disorder associated with type 2 diabetes mellitus     Elevated cholesterol     Hypercholesterolemia     Hypertension     Type 2 diabetes mellitus 2017     Social History     Socioeconomic History    Marital status:    Tobacco Use    Smoking status: Former     Packs/day: 0.25     Years: 5.00     Additional pack years: 0.00     Total pack years: 1.25     Types: Cigarettes, Electronic Cigarette     Start date: 2000     Quit date: 2008     Years since quittin.0    Smokeless tobacco: Never   Vaping Use    Vaping Use: Former   Substance and Sexual Activity    Alcohol use: Yes     Alcohol/week: 10.0 standard drinks of alcohol     Types: 10 Cans of beer per week     Comment: occ    Drug use: Never    Sexual activity: Yes      Partners: Male     Birth control/protection: I.U.D.       Problem list reviewed by Breanna Ramirez RPH on 1/9/2024 at 11:21 AM    Allergies  Known allergies and reactions were discussed with the patient. The patient's chart has been reviewed for allergy information and updated as necessary.   Patient has no known allergies.    Allergies reviewed by Breanna Ramirez RPH on 1/9/2024 at 11:21 AM    Relevant Laboratory Values  A1C Last 3 Results          1/19/2023    08:37 7/28/2023    09:53   HGBA1C Last 3 Results   Hemoglobin A1C 6.4  6.2      Lab Results   Component Value Date    HGBA1C 6.2 07/28/2023     Lab Results   Component Value Date    GLUCOSE 117 (H) 01/19/2023    CALCIUM 9.5 01/19/2023     01/19/2023    K 4.4 01/19/2023    CO2 27.0 01/19/2023     01/19/2023    BUN 18 01/19/2023    CREATININE 0.94 01/19/2023    BCR 19.1 01/19/2023    ANIONGAP 9.0 01/19/2023     Lab Results   Component Value Date    CHOL 201 (H) 01/19/2023    CHLPL 219 (H) 10/07/2022    TRIG 103 01/19/2023    HDL 43 01/19/2023     (H) 01/19/2023     Microalbumin          1/19/2023    08:53   Microalbumin   Microalbumin, Urine 1.2        Current Medication List  This medication list has been reviewed with the patient and evaluated for any interactions or necessary modifications/recommendations, and updated to include all prescription medications, OTC medications, and supplements the patient is currently taking.  This list reflects what is contained in the patient's profile, which has also been marked as reviewed to communicate to other providers it is the most up to date version of the patient's current medication therapy.     Current Outpatient Medications:     dapagliflozin Propanediol (Farxiga) 10 MG tablet, Take 1 tablet by mouth Daily., Disp: 21 tablet, Rfl: 7    lisinopril (PRINIVIL,ZESTRIL) 5 MG tablet, Take 1 tablet by mouth Daily., Disp: 21 tablet, Rfl: 7    metFORMIN ER (GLUCOPHAGE-XR) 500 MG 24 hr tablet, Take 2  tablets by mouth 2 (Two) Times a Day., Disp: 84 tablet, Rfl: 7    hydrOXYzine (ATARAX) 10 MG tablet, Take 1 tablet by mouth Every 8 (Eight) Hours As Needed for Anxiety. (Patient not taking: Reported on 7/28/2023), Disp: 90 tablet, Rfl: 1    ibuprofen (ADVIL,MOTRIN) 800 MG tablet, Take 1 tablet by mouth Every 8 (Eight) Hours As Needed for pain., Disp: 20 tablet, Rfl: 1    Lancets (OneTouch Delica Plus [id number]) misc, Use to test blood sugar twice daily, Disp: 100 each, Rfl: 5    OneTouch Verio test strip, Use to test blood sugar once daily (Patient taking differently: Use to test blood sugar once daily), Disp: 100 each, Rfl: 3    sildenafil (Viagra) 50 MG tablet, Take 1/2 tablet by mouth Daily As Needed for Erectile Dysfunction., Disp: 30 tablet, Rfl: 0    Medicines reviewed by Breanna Ramirez, [facility] on 1/9/2024 at 11:21 AM    Drug Interactions  Coadministration of metformin and NSAIDs may increase the risk of acute renal failure and lactic acidosis - we will continue to monitor kidney function (he only uses IBU PRN)  ACE inhibitors may enhance the adverse/toxic effect of metformin. This includes both a risk for hypoglycemia and for lactic acidosis.    Adverse Drug Reactions  Adverse Reactions Experienced: None  Plan for ADR Management: Not Required    Hospitalizations and Urgent Care Since Last Assessment  Hospitalizations or Admissions: None  ED Visits: None  Urgent Office Visits: None    Adherence and Self-Administration  Adherence related patient's specialty therapy was discussed with the patient. The Adherence segment of this outreach has been reviewed and updated.     Ongoing or New Barriers to Patient Adherence and/or Self-Administration: None   Methods for Supporting Patient Adherence and/or Self-Administration: None Required     Goals of Therapy  Goals related to the patient's specialty therapy was discussed with the patient. The Patient Goals segment of this outreach has been reviewed and updated.    Goals  Addressed Today    None         Reassessment Plan & Follow-Up  Medication Therapy Changes: None today    Additional Plans, Therapy Recommendations, or Therapy Problems to Be Addressed:   Patient needs refills on all medications. [facility] Farxiga, Metformin, and Lisinopril per NEETU Byrne.   Pharmacist to perform regular reassessments no more than (6) months from the previous assessment.  Care Coordinator to set up future refill outreaches, coordinate prescription delivery, and escalate clinical questions to pharmacist.     Attestation  I attest the patient was actively involved in and has agreed to the above plan of care. If the prescribed therapy is at any point deemed not appropriate based on the current or future assessments, a consultation will be initiated with the patient's specialty care provider to determine the best course of action. The revised plan of therapy will be documented along with any required assessments and/or additional patient education provided.     Breanna Ramirez, PharmD, LISA MOTTA  Clinical Specialty Pharmacist, Endocrinology  1/9/2024  11:38 EST

## 2024-01-26 ENCOUNTER — SPECIALTY PHARMACY (OUTPATIENT)
Dept: PHARMACY | Facility: HOSPITAL | Age: 44
End: 2024-01-26
Payer: COMMERCIAL

## 2024-02-02 ENCOUNTER — OFFICE VISIT (OUTPATIENT)
Dept: ENDOCRINOLOGY | Facility: CLINIC | Age: 44
End: 2024-02-02
Payer: COMMERCIAL

## 2024-02-02 VITALS
WEIGHT: 207 LBS | DIASTOLIC BLOOD PRESSURE: 74 MMHG | HEART RATE: 108 BPM | OXYGEN SATURATION: 97 % | SYSTOLIC BLOOD PRESSURE: 116 MMHG | BODY MASS INDEX: 25.74 KG/M2 | HEIGHT: 75 IN

## 2024-02-02 DIAGNOSIS — E11.65 TYPE 2 DIABETES MELLITUS WITH HYPERGLYCEMIA, WITHOUT LONG-TERM CURRENT USE OF INSULIN: Primary | ICD-10-CM

## 2024-02-02 LAB
EXPIRATION DATE: ABNORMAL
EXPIRATION DATE: NORMAL
GLUCOSE BLDC GLUCOMTR-MCNC: 124 MG/DL (ref 70–130)
HBA1C MFR BLD: 7.9 % (ref 4.5–5.7)
Lab: ABNORMAL
Lab: NORMAL

## 2024-02-02 NOTE — ASSESSMENT & PLAN NOTE
Worse with A1c - 7.9  Would not add glp1 as he does not need to lose more weight.  Will add dpp4   And recheck in 6 months.

## 2024-02-02 NOTE — PROGRESS NOTES
"     Office Note      Date: 2024  Patient Name: David Sanchez  MRN: [id number]  : 1980    Chief Complaint   Patient presents with    Diabetes       History of Present Illness:   David Sanchez is a 43 y.o. male who presents for Diabetes type 2.   Current RX metformin + sglt2    Bg checks are done:seldom   Hypoglycemia :rare       Last A1c:  Hemoglobin A1C   Date Value Ref Range Status   2024 7.9 (A) 4.5 - 5.7 % Final       Changes in health since last visit: has not been as diligent with his diet . Last eye exam up to date.    Subjective              Review of Systems:   Review of Systems   Endocrine: Negative for polydipsia and polyuria.       The following portions of the patient's history were reviewed and updated as appropriate: allergies, current medications, past family history, past medical history, past social history, past surgical history, and problem list.    Objective     Visit Vitals  /74   Pulse 108   Ht 190.5 cm (75\")   Wt 93.9 kg (207 lb)   SpO2 97%   BMI 25.87 kg/m²           Physical Exam:  Physical Exam  Vitals reviewed.   Constitutional:       Appearance: Normal appearance.   Neurological:      Mental Status: He is alert.   Psychiatric:         Mood and Affect: Mood normal.         Behavior: Behavior normal.         Thought Content: Thought content normal.         Judgment: Judgment normal.          Assessment / Plan      Assessment & Plan:  Problem List Items Addressed This Visit          Other    Type 2 diabetes mellitus - Primary (Chronic)    Overview     Diagnosed          Current Assessment & Plan     Worse with A1c - 7.9  Would not add glp1 as he does not need to lose more weight.  Will add dpp4   And recheck in 6 months.           Relevant Medications    dapagliflozin Propanediol (Farxiga) 10 MG tablet    lisinopril (PRINIVIL,ZESTRIL) 5 MG tablet    metFORMIN ER (GLUCOPHAGE-XR) 500 MG 24 hr tablet    SITagliptin (Januvia) 100 MG tablet    Other Relevant " Orders    POC Glucose, Blood (Completed)    POC Glycosylated Hemoglobin (Hb A1C) (Completed)         Electronically signed by :Tacho Camacho MD   02/02/2024

## 2024-02-07 ENCOUNTER — SPECIALTY PHARMACY (OUTPATIENT)
Dept: PHARMACY | Facility: HOSPITAL | Age: 44
End: 2024-02-07
Payer: COMMERCIAL

## 2024-02-07 NOTE — PROGRESS NOTES
Specialty Pharmacy Patient Management Program  Endocrinology Reassessment     David Sanchez is a 43 y.o. male with Type 2 Diabetes seen by an Endocrinology Provider and enrolled in the Endocrinology Patient Management program offered by [facility]. A follow-up was conducted, including assessment of continued therapy appropriateness, medication adherence, and side effect incidence and management for Metformin and Farxiga.     Patient is taking metformin ER 2,000 mg daily and Farxiga 10 mg daily to control BG. He reports tolerating the medications well, denies changes in medications, and denies missing any doses of his medications. He doesn't routinely monitor his BG at home. He denies any signs/symptoms of hypoglycemia at this time. He was seen by Dr. Camacho in [place] office last week.     Changes to Insurance Coverage or Financial Support  None     Relevant Past Medical History and Comorbidities  Relevant medical history and concomitant health conditions were discussed with the patient. The patient's chart has been reviewed for relevant past medical history and comorbid health conditions and updated as necessary.   Past Medical History:   Diagnosis Date    Colon polyp     Disorder associated with type 2 diabetes mellitus     Elevated cholesterol     Hypercholesterolemia     Hypertension     Type 2 diabetes mellitus 2017     Social History     Socioeconomic History    Marital status:    Tobacco Use    Smoking status: Former     Packs/day: 0.25     Years: 5.00     Additional pack years: 0.00     Total pack years: 1.25     Types: Cigarettes, Electronic Cigarette     Start date: 2000     Quit date: 2008     Years since quittin.1    Smokeless tobacco: Never   Vaping Use    Vaping Use: Former   Substance and Sexual Activity    Alcohol use: Yes     Alcohol/week: 10.0 standard drinks of alcohol     Types: 10 Cans of beer per week     Comment: occ    Drug use: Never     Sexual activity: Yes     Partners: Female     Birth control/protection: I.U.D.       Problem list reviewed by Breanna Ramirez RPH on 2/7/2024 at 11:33 AM    Allergies  Known allergies and reactions were discussed with the patient. The patient's chart has been reviewed for allergy information and updated as necessary.   Patient has no known allergies.    Allergies reviewed by Breanna Ramirez RPH on 2/7/2024 at 11:33 AM    Relevant Laboratory Values  A1C Last 3 Results          7/28/2023    09:53 2/2/2024    15:08   HGBA1C Last 3 Results   Hemoglobin A1C 6.2  7.9      Lab Results   Component Value Date    HGBA1C 7.9 (A) 02/02/2024     Lab Results   Component Value Date    GLUCOSE 117 (H) 01/19/2023    CALCIUM 9.5 01/19/2023     01/19/2023    K 4.4 01/19/2023    CO2 27.0 01/19/2023     01/19/2023    BUN 18 01/19/2023    CREATININE 0.94 01/19/2023    BCR 19.1 01/19/2023    ANIONGAP 9.0 01/19/2023     Lab Results   Component Value Date    CHOL 201 (H) 01/19/2023    CHLPL 219 (H) 10/07/2022    TRIG 103 01/19/2023    HDL 43 01/19/2023     (H) 01/19/2023           Current Medication List  This medication list has been reviewed with the patient and evaluated for any interactions or necessary modifications/recommendations, and updated to include all prescription medications, OTC medications, and supplements the patient is currently taking.  This list reflects what is contained in the patient's profile, which has also been marked as reviewed to communicate to other providers it is the most up to date version of the patient's current medication therapy.     Current Outpatient Medications:     dapagliflozin Propanediol (Farxiga) 10 MG tablet, Take 1 tablet by mouth Daily., Disp: 21 tablet, Rfl: 7    ibuprofen (ADVIL,MOTRIN) 800 MG tablet, Take 1 tablet by mouth Every 8 (Eight) Hours As Needed for pain., Disp: 20 tablet, Rfl: 1    Lancets (OneTouch Delica Plus Ojfrmn07D) misc, Use to test blood sugar twice daily,  Disp: 100 each, Rfl: 5    lisinopril (PRINIVIL,ZESTRIL) 5 MG tablet, Take 1 tablet by mouth Daily., Disp: 21 tablet, Rfl: 7    metFORMIN ER (GLUCOPHAGE-XR) 500 MG 24 hr tablet, Take 2 tablets by mouth 2 (Two) Times a Day., Disp: 84 tablet, Rfl: 7    OneTouch Verio test strip, Use to test blood sugar once daily (Patient taking differently: Use to test blood sugar once daily), Disp: 100 each, Rfl: 3    SITagliptin (Januvia) 100 MG tablet, Take 1 tablet by mouth Daily., Disp: 90 tablet, Rfl: 3    sildenafil (Viagra) 50 MG tablet, Take 1/2 tablet by mouth Daily As Needed for Erectile Dysfunction. (Patient not taking: Reported on 2/7/2024), Disp: 30 tablet, Rfl: 0    Medicines reviewed by Breanna Ramirez [facility] on 2/7/2024 at 11:34 AM    Drug Interactions  Coadministration of metformin and IBU may increase the risk of acute renal failure and lactic acidosis - provider will continue to routinely monitor kidney function (he only uses IBU PRN)  Lisinopril may enhance the adverse/toxic effect of metformin. This includes both a risk for hypoglycemia and for lactic acidosis.    Adverse Drug Reactions  Adverse Reactions Experienced: None  Plan for ADR Management: Not Required    Hospitalizations and Urgent Care Since Last Assessment  Hospitalizations or Admissions: None  ED Visits: None  Urgent Office Visits: None    Adherence and Self-Administration  Adherence related patient's specialty therapy was discussed with the patient. The Adherence segment of this outreach has been reviewed and updated.     Ongoing or New Barriers to Patient Adherence and/or Self-Administration: None   Methods for Supporting Patient Adherence and/or Self-Administration: None Required     Goals of Therapy  Goals related to the patient's specialty therapy was discussed with the patient. The Patient Goals segment of this outreach has been reviewed and updated.    Goals Addressed Today        Consistently take medications as prescribed      HEMOGLOBIN A1C < 7       Specialty Pharmacy General Goal      Prevent hypoglcyemia              Reassessment Plan & Follow-Up  Recent Provider Medication Therapy Changes: Per Dr. Camacho  Start Januvia 100 mg daily    Additional Plans, Therapy Recommendations, or Therapy Problems to Be Addressed:   None at this time. Downloaded and added savings card for [facility] in [facility]. Medication has been mailed to patient.   Pharmacist to perform regular reassessments no more than (6) months from the previous assessment.  Care Coordinator to set up future refill outreaches, coordinate prescription delivery, and escalate clinical questions to pharmacist.     Attestation  I attest the patient was actively involved in and has agreed to the above plan of care. If the prescribed therapy is at any point deemed not appropriate based on the current or future assessments, a consultation will be initiated with the patient's specialty care provider to determine the best course of action. The revised plan of therapy will be documented along with any required assessments and/or additional patient education provided.     Breanna Ramirez, PharmD, LISA MOTTA  Clinical Specialty Pharmacist, Endocrinology  2/7/2024  11:34 EST

## 2024-02-16 ENCOUNTER — SPECIALTY PHARMACY (OUTPATIENT)
Dept: PHARMACY | Facility: HOSPITAL | Age: 44
End: 2024-02-16
Payer: COMMERCIAL

## 2024-02-19 RX ORDER — IBUPROFEN 800 MG/1
800 TABLET ORAL EVERY 8 HOURS PRN
Qty: 20 TABLET | Refills: 1 | Status: SHIPPED | OUTPATIENT
Start: 2024-02-19

## 2024-03-07 ENCOUNTER — SPECIALTY PHARMACY (OUTPATIENT)
Dept: PHARMACY | Facility: HOSPITAL | Age: 44
End: 2024-03-07
Payer: COMMERCIAL

## 2024-03-28 ENCOUNTER — SPECIALTY PHARMACY (OUTPATIENT)
Dept: PHARMACY | Facility: HOSPITAL | Age: 44
End: 2024-03-28
Payer: COMMERCIAL

## 2024-04-11 ENCOUNTER — SPECIALTY PHARMACY (OUTPATIENT)
Dept: PHARMACY | Facility: HOSPITAL | Age: 44
End: 2024-04-11
Payer: COMMERCIAL

## 2024-04-11 NOTE — PROGRESS NOTES
Specialty Pharmacy Refill Coordination Note     David is a 43 y.o. male contacted today regarding refills of  Januvia, Farxiga, and Metformin specialty medication(s).    Reviewed and verified with patient:       Specialty medication(s) and dose(s) confirmed: yes    Refill Questions      Flowsheet Row Most Recent Value   Changes to allergies? No   Changes to medications? No   New conditions or infections since last clinic visit No   Unplanned office visit, urgent care, ED, or hospital admission in the last 4 weeks  No   How does patient/caregiver feel medication is working? Very good   Financial problems or insurance changes  No   If yes, describe changes in insurance or financial issues. na   Since the previous refill, were any specialty medication doses or scheduled injections missed or delayed?  No   If yes, please provide the amount na   Why were doses missed? na   Does this patient require a clinical escalation to a pharmacist? No            Delivery Questions      Flowsheet Row Most Recent Value   Copay verified? No   Copay amount na   Copay form of payment No copayment ($0)                   Follow-up: 21 day(s)     Alda Arnett, Pharmacy Technician  Specialty Pharmacy Technician

## 2024-05-10 ENCOUNTER — SPECIALTY PHARMACY (OUTPATIENT)
Dept: PHARMACY | Facility: HOSPITAL | Age: 44
End: 2024-05-10
Payer: COMMERCIAL

## 2024-05-10 NOTE — PROGRESS NOTES
Specialty Pharmacy Refill Coordination Note     David is a 43 y.o. male contacted today regarding refills of  Januvia  and Metformin specialty medication(s).    Reviewed and verified with patient:       Specialty medication(s) and dose(s) confirmed: yes    Refill Questions      Flowsheet Row Most Recent Value   Changes to allergies? No   Changes to medications? No   New conditions or infections since last clinic visit No   Unplanned office visit, urgent care, ED, or hospital admission in the last 4 weeks  No   How does patient/caregiver feel medication is working? Very good   Financial problems or insurance changes  No   If yes, describe changes in insurance or financial issues. na   Since the previous refill, were any specialty medication doses or scheduled injections missed or delayed?  No   If yes, please provide the amount na   Why were doses missed? na   Does this patient require a clinical escalation to a pharmacist? No            Delivery Questions      Flowsheet Row Most Recent Value   Copay verified? No   Copay amount na   Copay form of payment No copayment ($0)                   Follow-up: 30 day(s)     Alda Arnett, Pharmacy Technician  Specialty Pharmacy Technician

## 2024-05-20 ENCOUNTER — LAB (OUTPATIENT)
Dept: FAMILY MEDICINE CLINIC | Facility: CLINIC | Age: 44
End: 2024-05-20
Payer: COMMERCIAL

## 2024-05-20 ENCOUNTER — OFFICE VISIT (OUTPATIENT)
Dept: FAMILY MEDICINE CLINIC | Facility: CLINIC | Age: 44
End: 2024-05-20
Payer: COMMERCIAL

## 2024-05-20 VITALS
OXYGEN SATURATION: 98 % | WEIGHT: 205.6 LBS | BODY MASS INDEX: 25.56 KG/M2 | RESPIRATION RATE: 16 BRPM | SYSTOLIC BLOOD PRESSURE: 124 MMHG | TEMPERATURE: 97.8 F | DIASTOLIC BLOOD PRESSURE: 80 MMHG | HEART RATE: 99 BPM | HEIGHT: 75 IN

## 2024-05-20 DIAGNOSIS — R10.13 DYSPEPSIA: ICD-10-CM

## 2024-05-20 DIAGNOSIS — R07.89 ATYPICAL CHEST PAIN: ICD-10-CM

## 2024-05-20 DIAGNOSIS — E78.2 MIXED HYPERLIPIDEMIA: ICD-10-CM

## 2024-05-20 DIAGNOSIS — R07.89 ATYPICAL CHEST PAIN: Primary | ICD-10-CM

## 2024-05-20 PROCEDURE — 80061 LIPID PANEL: CPT | Performed by: STUDENT IN AN ORGANIZED HEALTH CARE EDUCATION/TRAINING PROGRAM

## 2024-05-20 PROCEDURE — 99214 OFFICE O/P EST MOD 30 MIN: CPT | Performed by: STUDENT IN AN ORGANIZED HEALTH CARE EDUCATION/TRAINING PROGRAM

## 2024-05-20 PROCEDURE — 80053 COMPREHEN METABOLIC PANEL: CPT | Performed by: STUDENT IN AN ORGANIZED HEALTH CARE EDUCATION/TRAINING PROGRAM

## 2024-05-20 PROCEDURE — 36415 COLL VENOUS BLD VENIPUNCTURE: CPT

## 2024-05-20 PROCEDURE — 85027 COMPLETE CBC AUTOMATED: CPT | Performed by: STUDENT IN AN ORGANIZED HEALTH CARE EDUCATION/TRAINING PROGRAM

## 2024-05-20 PROCEDURE — 83013 H PYLORI (C-13) BREATH: CPT | Performed by: STUDENT IN AN ORGANIZED HEALTH CARE EDUCATION/TRAINING PROGRAM

## 2024-05-20 PROCEDURE — 84443 ASSAY THYROID STIM HORMONE: CPT | Performed by: STUDENT IN AN ORGANIZED HEALTH CARE EDUCATION/TRAINING PROGRAM

## 2024-05-20 RX ORDER — OMEPRAZOLE 40 MG/1
40 CAPSULE, DELAYED RELEASE ORAL DAILY
Qty: 30 CAPSULE | Refills: 2 | Status: SHIPPED | OUTPATIENT
Start: 2024-05-20

## 2024-05-20 RX ORDER — LORATADINE 10 MG/1
10 TABLET ORAL DAILY
COMMUNITY

## 2024-05-20 RX ORDER — CALCIUM CARBONATE/VITAMIN D3 600 MG-10
100 TABLET ORAL DAILY
COMMUNITY

## 2024-05-20 RX ORDER — MAGNESIUM OXIDE 400 MG/1
400 TABLET ORAL DAILY
COMMUNITY

## 2024-05-20 RX ORDER — ROSUVASTATIN CALCIUM 5 MG/1
5 TABLET, COATED ORAL DAILY
Qty: 30 TABLET | Refills: 2 | Status: SHIPPED | OUTPATIENT
Start: 2024-05-20

## 2024-05-20 RX ORDER — MELATONIN
1000 DAILY
COMMUNITY

## 2024-05-21 ENCOUNTER — TELEPHONE (OUTPATIENT)
Dept: FAMILY MEDICINE CLINIC | Facility: CLINIC | Age: 44
End: 2024-05-21
Payer: COMMERCIAL

## 2024-05-21 LAB
ALBUMIN SERPL-MCNC: 4.8 G/DL (ref 3.5–5.2)
ALBUMIN/GLOB SERPL: 2 G/DL
ALP SERPL-CCNC: 72 U/L (ref 39–117)
ALT SERPL W P-5'-P-CCNC: 74 U/L (ref 1–41)
ANION GAP SERPL CALCULATED.3IONS-SCNC: 12 MMOL/L (ref 5–15)
AST SERPL-CCNC: 31 U/L (ref 1–40)
BILIRUB SERPL-MCNC: 1.1 MG/DL (ref 0–1.2)
BUN SERPL-MCNC: 17 MG/DL (ref 6–20)
BUN/CREAT SERPL: 16.8 (ref 7–25)
CALCIUM SPEC-SCNC: 9.7 MG/DL (ref 8.6–10.5)
CHLORIDE SERPL-SCNC: 101 MMOL/L (ref 98–107)
CHOLEST SERPL-MCNC: 210 MG/DL (ref 0–200)
CO2 SERPL-SCNC: 26 MMOL/L (ref 22–29)
CREAT SERPL-MCNC: 1.01 MG/DL (ref 0.76–1.27)
DEPRECATED RDW RBC AUTO: 38.7 FL (ref 37–54)
EGFRCR SERPLBLD CKD-EPI 2021: 94.6 ML/MIN/1.73
ERYTHROCYTE [DISTWIDTH] IN BLOOD BY AUTOMATED COUNT: 12.7 % (ref 12.3–15.4)
GLOBULIN UR ELPH-MCNC: 2.4 GM/DL
GLUCOSE SERPL-MCNC: 172 MG/DL (ref 65–99)
HCT VFR BLD AUTO: 46.1 % (ref 37.5–51)
HDLC SERPL-MCNC: 43 MG/DL (ref 40–60)
HGB BLD-MCNC: 16.4 G/DL (ref 13–17.7)
LDLC SERPL CALC-MCNC: 112 MG/DL (ref 0–100)
LDLC/HDLC SERPL: 2.4 {RATIO}
MCH RBC QN AUTO: 30.1 PG (ref 26.6–33)
MCHC RBC AUTO-ENTMCNC: 35.6 G/DL (ref 31.5–35.7)
MCV RBC AUTO: 84.7 FL (ref 79–97)
PLATELET # BLD AUTO: 242 10*3/MM3 (ref 140–450)
PMV BLD AUTO: 12.2 FL (ref 6–12)
POTASSIUM SERPL-SCNC: 4.1 MMOL/L (ref 3.5–5.2)
PROT SERPL-MCNC: 7.2 G/DL (ref 6–8.5)
RBC # BLD AUTO: 5.44 10*6/MM3 (ref 4.14–5.8)
SODIUM SERPL-SCNC: 139 MMOL/L (ref 136–145)
TRIGL SERPL-MCNC: 318 MG/DL (ref 0–150)
TSH SERPL DL<=0.05 MIU/L-ACNC: 1.76 UIU/ML (ref 0.27–4.2)
VLDLC SERPL-MCNC: 55 MG/DL (ref 5–40)
WBC NRBC COR # BLD AUTO: 5.76 10*3/MM3 (ref 3.4–10.8)

## 2024-05-21 NOTE — TELEPHONE ENCOUNTER
Caller: David Sanchez    Relationship: Self    Best call back number: [phone]     Caller requesting test results: YES    What test was performed: BLOOD WORK    When was the test performed: 05/20/2024    Where was the test performed: IN OFFICE    Additional notes: PATIENT WOULD LIKE A CALL BACK TO DISCUSS TEST RESULTS.

## 2024-05-21 NOTE — PROGRESS NOTES
Chief Complaint  Chest Pain (Intermittent)    HPI:   Chest Pain        David Sanchez is a 43 y.o. male who presents today to [facility] for Chest Pain (Intermittent). Patient has a history of type II diabetes following with endocrinology, now well controlled. He reports for the past 10 years he has had intermittent left upper quadrant pain that can radiate from the LUQ to his left arm. He reports that he sometimes has chest pain/spasms as well. The pain is mild in intensity but is concerning to him. He is not on a statin due to previous Adolfo. The patient reports that the chest pain does not happen with exertion. He denies exertional dyspnea. He reports that usually some tums improve his pain. He does report mild acid reflux.     Previous History:   Past Medical History:   Diagnosis Date    Colon polyp     Disorder associated with type 2 diabetes mellitus     Elevated cholesterol     Hypercholesterolemia     Hypertension     Type 2 diabetes mellitus       Past Surgical History:   Procedure Laterality Date    APPENDECTOMY      COLONOSCOPY N/A 2023    Procedure: COLONOSCOPY;  Surgeon: Satnam Garnett MD;  Location: [facility];  Service: Gastroenterology;  Laterality: N/A;    EYE SURGERY      LASIK        Social History     Socioeconomic History    Marital status:    Tobacco Use    Smoking status: Former     Current packs/day: 0.00     Average packs/day: 0.3 packs/day for 7.0 years (1.8 ttl pk-yrs)     Types: Cigarettes, Electronic Cigarette     Start date: 2000     Quit date: 2008     Years since quittin.3    Smokeless tobacco: Never   Vaping Use    Vaping status: Never Used   Substance and Sexual Activity    Alcohol use: Yes     Alcohol/week: 10.0 standard drinks of alcohol     Types: 10 Cans of beer per week     Comment: occ    Drug use: Never    Sexual activity: Yes     Partners: Female     Birth control/protection: I.U.D.      Health Maintenance  Due   Topic Date Due    DIABETIC EYE EXAM  Never done    Hepatitis B (2 of 3 - 3-dose series) 09/01/1998    TDAP/TD VACCINES (2 - Tdap) 08/04/2008    HEPATITIS C SCREENING  Never done    COVID-19 Vaccine (4 - 2023-24 season) 09/01/2023    URINE MICROALBUMIN  01/19/2024    ANNUAL PHYSICAL  02/24/2024    BMI FOLLOWUP  02/28/2024    HEMOGLOBIN A1C  08/02/2024        Current Medications:  Current Outpatient Medications   Medication Sig Dispense Refill    Cholecalciferol 25 MCG (1000 UT) tablet Take 1 tablet by mouth Daily.      dapagliflozin Propanediol (Farxiga) 10 MG tablet Take 1 tablet by mouth Daily. 21 tablet 7    ibuprofen (ADVIL,MOTRIN) 800 MG tablet Take 1 tablet by mouth Every 8 (Eight) Hours As Needed for pain. 20 tablet 1    Lancets (OneTouch Delica Plus Sacycm76T) misc Use to test blood sugar twice daily 100 each 5    lisinopril (PRINIVIL,ZESTRIL) 5 MG tablet Take 1 tablet by mouth Daily. 21 tablet 7    loratadine (CLARITIN) 10 MG tablet Take 1 tablet by mouth Daily.      magnesium oxide (MAG-OX) 400 MG tablet Take 1 tablet by mouth Daily.      metFORMIN ER (GLUCOPHAGE-XR) 500 MG 24 hr tablet Take 2 tablets by mouth 2 (Two) Times a Day. 84 tablet 7    OneTouch Verio test strip Use to test blood sugar once daily (Patient taking differently: Use to test blood sugar once daily) 100 each 3    SITagliptin (Januvia) 100 MG tablet Take 1 tablet by mouth Daily. 90 tablet 3    Thiamine Mononitrate (B1) 100 MG tablet Take 100 mg by mouth Daily.      omeprazole (priLOSEC) 40 MG capsule Take 1 capsule by mouth Daily. 30 capsule 2    rosuvastatin (Crestor) 5 MG tablet Take 1 tablet by mouth Daily. 30 tablet 2    sildenafil (Viagra) 50 MG tablet Take 1/2 tablet by mouth Daily As Needed for Erectile Dysfunction. (Patient not taking: Reported on 5/20/2024) 30 tablet 0     No current facility-administered medications for this visit.       Allergies:   No Known Allergies    Vitals:   /80 (BP Location: Right arm,  "Patient Position: Sitting, Cuff Size: Adult)   Pulse 99   Temp 97.8 °F (36.6 °C) (Temporal)   Resp 16   Ht 190.5 cm (75\")   Wt 93.3 kg (205 lb 9.6 oz)   SpO2 98%   BMI 25.70 kg/m²     Estimated body mass index is 25.7 kg/m² as calculated from the following:    Height as of this encounter: 190.5 cm (75\").    Weight as of this encounter: 93.3 kg (205 lb 9.6 oz).    David Sanchez  reports that he quit smoking about 16 years ago. His smoking use included cigarettes and electronic cigarette. He started smoking about 23 years ago. He has a 1.8 pack-year smoking history. He has never used smokeless tobacco.            Physical Exam:   Physical Exam  Constitutional:       Appearance: Normal appearance.   HENT:      Mouth/Throat:      Mouth: Mucous membranes are moist.   Cardiovascular:      Rate and Rhythm: Normal rate and regular rhythm.   Pulmonary:      Effort: Pulmonary effort is normal.      Breath sounds: Normal breath sounds. No wheezing or rhonchi.   Musculoskeletal:         General: Normal range of motion.   Skin:     General: Skin is warm and dry.   Neurological:      Mental Status: He is alert.   Psychiatric:         Mood and Affect: Mood normal.          Lab Results:   Lab on 05/20/2024   Component Date Value Ref Range Status    WBC 05/20/2024 5.76  3.40 - 10.80 10*3/mm3 Final    RBC 05/20/2024 5.44  4.14 - 5.80 10*6/mm3 Final    Hemoglobin 05/20/2024 16.4  13.0 - 17.7 g/dL Final    Hematocrit 05/20/2024 46.1  37.5 - 51.0 % Final    MCV 05/20/2024 84.7  79.0 - 97.0 fL Final    MCH 05/20/2024 30.1  26.6 - 33.0 pg Final    MCHC 05/20/2024 35.6  31.5 - 35.7 g/dL Final    RDW 05/20/2024 12.7  12.3 - 15.4 % Final    RDW-SD 05/20/2024 38.7  37.0 - 54.0 fl Final    MPV 05/20/2024 12.2 (H)  6.0 - 12.0 fL Final    Platelets 05/20/2024 242  140 - 450 10*3/mm3 Final    Glucose 05/20/2024 172 (H)  65 - 99 mg/dL Final    BUN 05/20/2024 17  6 - 20 mg/dL Final    Creatinine 05/20/2024 1.01  0.76 - 1.27 mg/dL Final    " Sodium 05/20/2024 139  136 - 145 mmol/L Final    Potassium 05/20/2024 4.1  3.5 - 5.2 mmol/L Final    Chloride 05/20/2024 101  98 - 107 mmol/L Final    CO2 05/20/2024 26.0  22.0 - 29.0 mmol/L Final    Calcium 05/20/2024 9.7  8.6 - 10.5 mg/dL Final    Total Protein 05/20/2024 7.2  6.0 - 8.5 g/dL Final    Albumin 05/20/2024 4.8  3.5 - 5.2 g/dL Final    ALT (SGPT) 05/20/2024 74 (H)  1 - 41 U/L Final    AST (SGOT) 05/20/2024 31  1 - 40 U/L Final    Alkaline Phosphatase 05/20/2024 72  39 - 117 U/L Final    Total Bilirubin 05/20/2024 1.1  0.0 - 1.2 mg/dL Final    Globulin 05/20/2024 2.4  gm/dL Final    A/G Ratio 05/20/2024 2.0  g/dL Final    BUN/Creatinine Ratio 05/20/2024 16.8  7.0 - 25.0 Final    Anion Gap 05/20/2024 12.0  5.0 - 15.0 mmol/L Final    eGFR 05/20/2024 94.6  >60.0 mL/min/1.73 Final    TSH 05/20/2024 1.760  0.270 - 4.200 uIU/mL Final    Total Cholesterol 05/20/2024 210 (H)  0 - 200 mg/dL Final    Triglycerides 05/20/2024 318 (H)  0 - 150 mg/dL Final    HDL Cholesterol 05/20/2024 43  40 - 60 mg/dL Final    LDL Cholesterol  05/20/2024 112 (H)  0 - 100 mg/dL Final    VLDL Cholesterol 05/20/2024 55 (H)  5 - 40 mg/dL Final    LDL/HDL Ratio 05/20/2024 2.40   Final   Office Visit on 02/02/2024   Component Date Value Ref Range Status    Glucose 02/02/2024 124  70 - 130 mg/dL Final    Lot Number 02/02/2024 [id number]   Final    Expiration Date 02/02/2024 6/16/24   Final    Hemoglobin A1C 02/02/2024 7.9 (A)  4.5 - 5.7 % Final    Lot Number 02/02/2024 [id number]   Final    Expiration Date 02/02/2024 9/28/25   Final       Assessment and Plan  Diagnoses and all orders for this visit:    1. Atypical chest pain (Primary)  -     CBC No Differential; Future  -     Comprehensive metabolic panel; Future  -     TSH Rfx On Abnormal To Free T4; Future  -     Ambulatory Referral to Cardiology    2. Dyspepsia  -     H. Pylori Breath Test - Breath, Lung; Future  -     omeprazole (priLOSEC) 40 MG capsule; Take 1 capsule by mouth Daily.   Dispense: 30 capsule; Refill: 2    3. Mixed hyperlipidemia  -     rosuvastatin (Crestor) 5 MG tablet; Take 1 tablet by mouth Daily.  Dispense: 30 tablet; Refill: 2  -     Lipid panel; Future    Patient has atypical chest pain that has been present for over ten years. He reports that tums is effective. Localizing his pain in starts from his left lower quadrant and can be dependent on what he eats. Highest on my differential is GERD/Dyspepsia. I will check for H. Pylori, start high dose PPI x 6-8 weeks and follow up. If pain is still present I will consider EGD. The patient has several cardiovascular risk factors, Type II DM, HTN, HLD, and fam hx. I think he warrants a cardiac evaluation as well.   Patients symptoms are concerning for dyspepsia related to H. Pylori. Will do urea breath test.   Patient has HLD and type II diabetes, needs ASCVD prevention. I will start at low dose statin and try to titrate up to moderate intensity if tolerated.     BMI is >= 25 and <30. (Overweight) The following options were offered after discussion;: weight loss educational material (shared in after visit summary)       New Medications:   New Medications Ordered This Visit   Medications    rosuvastatin (Crestor) 5 MG tablet     Sig: Take 1 tablet by mouth Daily.     Dispense:  30 tablet     Refill:  2    omeprazole (priLOSEC) 40 MG capsule     Sig: Take 1 capsule by mouth Daily.     Dispense:  30 capsule     Refill:  2       Discontinued Medications:   There are no discontinued medications.              Follow Up:   Return in about 6 weeks (around 7/1/2024).    Patient was given instructions and counseling regarding his condition or for health maintenance advice. Please see specific information pulled into the AVS if appropriate.       This document has been electronically signed by Ernie Bell,    May 21, 2024 08:23 EDT    Dictated Utilizing Dragon Dictation: Part of this note may be an electronic transcription/translation of  spoken language to printed text using the Dragon Dictation System.

## 2024-05-22 ENCOUNTER — TELEPHONE (OUTPATIENT)
Dept: FAMILY MEDICINE CLINIC | Facility: CLINIC | Age: 44
End: 2024-05-22
Payer: COMMERCIAL

## 2024-05-22 LAB — UREA BREATH TEST QL: NEGATIVE

## 2024-05-24 ENCOUNTER — SPECIALTY PHARMACY (OUTPATIENT)
Dept: PHARMACY | Facility: HOSPITAL | Age: 44
End: 2024-05-24
Payer: COMMERCIAL

## 2024-05-24 ENCOUNTER — OFFICE VISIT (OUTPATIENT)
Dept: CARDIOLOGY | Facility: CLINIC | Age: 44
End: 2024-05-24
Payer: COMMERCIAL

## 2024-05-24 VITALS
HEART RATE: 96 BPM | DIASTOLIC BLOOD PRESSURE: 86 MMHG | WEIGHT: 207 LBS | HEIGHT: 75 IN | SYSTOLIC BLOOD PRESSURE: 132 MMHG | OXYGEN SATURATION: 98 % | BODY MASS INDEX: 25.74 KG/M2

## 2024-05-24 DIAGNOSIS — E78.00 HYPERCHOLESTEROLEMIA: Chronic | ICD-10-CM

## 2024-05-24 DIAGNOSIS — R07.9 CHEST PAIN, UNSPECIFIED TYPE: Primary | ICD-10-CM

## 2024-05-24 DIAGNOSIS — R00.0 TACHYCARDIA: ICD-10-CM

## 2024-05-24 DIAGNOSIS — E11.65 TYPE 2 DIABETES MELLITUS WITH HYPERGLYCEMIA, WITHOUT LONG-TERM CURRENT USE OF INSULIN: Chronic | ICD-10-CM

## 2024-05-24 DIAGNOSIS — I10 ESSENTIAL HYPERTENSION: ICD-10-CM

## 2024-05-24 DIAGNOSIS — R06.00 DYSPNEA, UNSPECIFIED TYPE: ICD-10-CM

## 2024-05-24 PROCEDURE — 93000 ELECTROCARDIOGRAM COMPLETE: CPT | Performed by: NURSE PRACTITIONER

## 2024-05-24 PROCEDURE — 99214 OFFICE O/P EST MOD 30 MIN: CPT | Performed by: NURSE PRACTITIONER

## 2024-05-24 RX ORDER — IBUPROFEN 800 MG/1
800 TABLET ORAL EVERY 8 HOURS PRN
Qty: 20 TABLET | Refills: 1 | Status: SHIPPED | OUTPATIENT
Start: 2024-05-24

## 2024-05-24 NOTE — LETTER
May 24, 2024     Mitch Reed DO  [address] [place] [address]    Patient: David Sanchez   YOB: 1980   Date of Visit: 5/24/2024       Dear Mitch Reed,     David Sanchez was in my office today. Below is a copy of my note.    If you have questions, please do not hesitate to call me. I look forward to following David along with you.         Sincerely,        AKIL Leigh        CC: Ernie Bell,     Subjective    David Sanchez is a 43 y.o. male.   Chief Complaint   Patient presents with   • Establish Care   • Chest Pain     Left sided that radiates into left arm     History of Present Illness   David Sanchez is a 43-year-old male who presents to clinic today as a new patient for cardiology evaluation.    He was referred by primary for further evaluation of chest discomfort.  He states symptoms have been ongoing for some time.  He complains of a sharp discomfort in the left side of his chest that sometimes will go under his arm into his axillary area.  He states occasionally Tums will help with his symptoms, his PCP recently started Prilosec which he feels has helped. He denies nausea, vomiting or diaphoresis.  He states part workup many years ago that was okay per patient report.  He denies worsening symptoms with activity.  He is a non-smoker.  Family history significant for paternal grandmother with CABG.  He reports longstanding tachycardia.  He states he even quit wearing his Apple Watch as it would make him nervous when his heart rate would elevate into the 120s at rest.  He denies any previous diagnosis of arrhythmia.  Recent labs with a normal TSH and CBC.  He does consume some caffeine.    Cardiac risk factors DM currently managed by primary on oral medications and most recent hemoglobin A1c was 7.9. Hyperlipidemia recently restarted Crestor.  He reports previously being on statin therapy but discontinued due to myalgias.  He has had both an intolerance to  Lipitor and Crestor.  He recently restarted the Crestor and has felt some myalgias but is willing to continue to see if the myalgias persist.  Hypertension currently on lisinopril.  He denies home monitoring.  He denies medication side effects and reports compliance with medicine.      Patient Active Problem List   Diagnosis   • Type 2 diabetes mellitus   • Hypercholesterolemia   • Essential hypertension   • Screening for colon cancer     Past Medical History:   Diagnosis Date   • Colon polyp    • Disorder associated with type 2 diabetes mellitus    • Elevated cholesterol    • Hypercholesterolemia    • Hypertension    • Type 2 diabetes mellitus      Past Surgical History:   Procedure Laterality Date   • APPENDECTOMY     • COLONOSCOPY N/A 2023    Procedure: COLONOSCOPY;  Surgeon: Satnam Garnett MD;  Location: [facility];  Service: Gastroenterology;  Laterality: N/A;   • EYE SURGERY     • LASIK       Family History   Problem Relation Age of Onset   • Diabetes Father    • Diabetes Maternal Grandmother      Social History     Tobacco Use   • Smoking status: Former     Current packs/day: 0.00     Average packs/day: 0.3 packs/day for 7.0 years (1.8 ttl pk-yrs)     Types: Cigarettes, Electronic Cigarette     Start date: 2000     Quit date: 2008     Years since quittin.4   • Smokeless tobacco: Never   Vaping Use   • Vaping status: Never Used   Substance Use Topics   • Alcohol use: Yes     Alcohol/week: 10.0 standard drinks of alcohol     Types: 10 Cans of beer per week     Comment: occ   • Drug use: Never     The following portions of the patient's history were reviewed and updated as appropriate: allergies, current medications, past family history, past medical history, past social history, past surgical history and problem list.    No Known Allergies      Current Outpatient Medications:   •  Cholecalciferol 25 MCG (1000 UT) tablet, Take 1 tablet by mouth Daily., Disp: , Rfl:   •  dapagliflozin  Propanediol (Farxiga) 10 MG tablet, Take 1 tablet by mouth Daily., Disp: 21 tablet, Rfl: 7  •  ibuprofen (ADVIL,MOTRIN) 800 MG tablet, Take 1 tablet by mouth Every 8 (Eight) Hours As Needed for pain., Disp: 20 tablet, Rfl: 1  •  Lancets (OneTouch Delica Plus [id number]) misc, Use to test blood sugar twice daily, Disp: 100 each, Rfl: 5  •  lisinopril (PRINIVIL,ZESTRIL) 5 MG tablet, Take 1 tablet by mouth Daily., Disp: 21 tablet, Rfl: 7  •  loratadine (CLARITIN) 10 MG tablet, Take 1 tablet by mouth Daily., Disp: , Rfl:   •  magnesium oxide (MAG-OX) 400 MG tablet, Take 1 tablet by mouth Daily., Disp: , Rfl:   •  metFORMIN ER (GLUCOPHAGE-XR) 500 MG 24 hr tablet, Take 2 tablets by mouth 2 (Two) Times a Day., Disp: 84 tablet, Rfl: 7  •  omeprazole (priLOSEC) 40 MG capsule, Take 1 capsule by mouth Daily., Disp: 30 capsule, Rfl: 2  •  OneTouch Verio test strip, Use to test blood sugar once daily (Patient taking differently: Use to test blood sugar once daily), Disp: 100 each, Rfl: 3  •  rosuvastatin (Crestor) 5 MG tablet, Take 1 tablet by mouth Daily., Disp: 30 tablet, Rfl: 2  •  SITagliptin (Januvia) 100 MG tablet, Take 1 tablet by mouth Daily., Disp: 90 tablet, Rfl: 3  •  Thiamine Mononitrate (B1) 100 MG tablet, Take 100 mg by mouth Daily., Disp: , Rfl:   •  sildenafil (Viagra) 50 MG tablet, Take 1/2 tablet by mouth Daily As Needed for Erectile Dysfunction. (Patient not taking: Reported on 5/20/2024), Disp: 30 tablet, Rfl: 0    Review of Systems   Constitutional:  Negative for activity change, appetite change, chills, diaphoresis, fatigue and fever.   HENT:  Negative for congestion, drooling, ear discharge, ear pain, mouth sores, nosebleeds, postnasal drip, rhinorrhea, sinus pressure, sneezing and sore throat.    Eyes:  Negative for pain, discharge and visual disturbance.   Respiratory:  Positive for shortness of breath. Negative for cough, chest tightness and wheezing.    Cardiovascular:  Positive for chest pain. Negative  "for palpitations and leg swelling.   Gastrointestinal:  Negative for abdominal pain, constipation, diarrhea, nausea and vomiting.   Endocrine: Negative for cold intolerance, heat intolerance, polydipsia, polyphagia and polyuria.   Musculoskeletal:  Negative for arthralgias, myalgias and neck pain.   Skin:  Negative for rash and wound.   Neurological:  Negative for dizziness, syncope, speech difficulty, weakness, light-headedness and headaches.   Hematological:  Negative for adenopathy. Does not bruise/bleed easily.   Psychiatric/Behavioral:  Negative for confusion, dysphoric mood and sleep disturbance. The patient is not nervous/anxious.    All other systems reviewed and are negative.    /86 (BP Location: Left arm, Patient Position: Sitting, Cuff Size: Adult)   Pulse 96   Ht 190.5 cm (75\")   Wt 93.9 kg (207 lb)   SpO2 98%   BMI 25.87 kg/m²     Objective  No Known Allergies    Physical Exam  Vitals reviewed.   Constitutional:       Appearance: Normal appearance. He is well-developed.   HENT:      Head: Normocephalic.   Eyes:      Conjunctiva/sclera: Conjunctivae normal.   Neck:      Thyroid: No thyromegaly.      Vascular: No carotid bruit or JVD.   Cardiovascular:      Rate and Rhythm: Normal rate and regular rhythm.   Pulmonary:      Effort: Pulmonary effort is normal.      Breath sounds: Normal breath sounds.   Musculoskeletal:      Cervical back: Neck supple.      Right lower leg: No edema.      Left lower leg: No edema.   Skin:     General: Skin is warm and dry.   Neurological:      Mental Status: He is alert and oriented to person, place, and time.   Psychiatric:         Attention and Perception: Attention normal.         Mood and Affect: Mood normal.         Speech: Speech normal.         Behavior: Behavior normal. Behavior is cooperative.         Cognition and Memory: Cognition normal.           ECG 12 Lead    Date/Time: 5/24/2024 10:30 AM  Performed by: Shirin Grigsby APRN    Authorized by: " Shirin Grigsby, AKIL  Comparison: not compared with previous ECG   Previous ECG: no previous ECG available  Rhythm: sinus rhythm  Rate: normal  BPM: 96  Other findings: left ventricular hypertrophy    Clinical impression: non-specific ECG  Comments: QT/QTc - 352/406          LABS  WBC   Date Value Ref Range Status   05/20/2024 5.76 3.40 - 10.80 10*3/mm3 Final     RBC   Date Value Ref Range Status   05/20/2024 5.44 4.14 - 5.80 10*6/mm3 Final     Hemoglobin   Date Value Ref Range Status   05/20/2024 16.4 13.0 - 17.7 g/dL Final     Hematocrit   Date Value Ref Range Status   05/20/2024 46.1 37.5 - 51.0 % Final     MCV   Date Value Ref Range Status   05/20/2024 84.7 79.0 - 97.0 fL Final     MCH   Date Value Ref Range Status   05/20/2024 30.1 26.6 - 33.0 pg Final     MCHC   Date Value Ref Range Status   05/20/2024 35.6 31.5 - 35.7 g/dL Final     RDW   Date Value Ref Range Status   05/20/2024 12.7 12.3 - 15.4 % Final     RDW-SD   Date Value Ref Range Status   05/20/2024 38.7 37.0 - 54.0 fl Final     MPV   Date Value Ref Range Status   05/20/2024 12.2 (H) 6.0 - 12.0 fL Final     Platelets   Date Value Ref Range Status   05/20/2024 242 140 - 450 10*3/mm3 Final       Total Cholesterol   Date Value Ref Range Status   05/20/2024 210 (H) 0 - 200 mg/dL Final     Triglycerides   Date Value Ref Range Status   05/20/2024 318 (H) 0 - 150 mg/dL Final     HDL Cholesterol   Date Value Ref Range Status   05/20/2024 43 40 - 60 mg/dL Final     LDL Cholesterol    Date Value Ref Range Status   05/20/2024 112 (H) 0 - 100 mg/dL Final     LDL Chol Calc (NIH)   Date Value Ref Range Status   10/07/2022 146 (H) 0 - 99 mg/dL Final           Assessment & Plan  Diagnoses and all orders for this visit:    1. Chest pain, unspecified type (Primary)  -     ECG 12 Lead  -     Stress Test With Myocardial Perfusion (1 Day); Future  EKG reviewed and discussed, no acute changes  Further evaluation recommended  Recommend daily aspirin until workup  complete  Advised if new or worsening symptoms while awaiting workup, go to the ER.  Expresses understanding    2. Dyspnea, unspecified type  -     Adult Transthoracic Echo Complete W/ Cont if Necessary Per Protocol; Future  Further evaluation will be arranged    3. Tachycardia   Further evaluation will be arranged, patient will return after the holiday weekend to have a monitor placed for 7 days.  Recent TSH and CBC were normal.    4. Essential hypertension  Well-controlled, continue current therapy  Sodium restrictions recommend  Routine monitoring recommended    5. Hypercholesterolemia  Continue on statin, closely monitor liver enzymes if he continues to experience myalgias consider PCSK9 inhibitors or Leqvio.    6. Type 2 diabetes mellitus with hyperglycemia, without long-term current use of insulin  Recommend tight glycemic control for overall health benefit    Review of medical record    Lifestyle modifications including heart healthy diet, regular exercise, maintenance of desirable body weight and avoidance of tobacco products    Follow-up in 4 to 6 weeks to discuss and review testing, sooner if needed

## 2024-05-24 NOTE — PROGRESS NOTES
Subjective     David Sanchez is a 43 y.o. male.   Chief Complaint   Patient presents with    Establish Care    Chest Pain     Left sided that radiates into left arm     History of Present Illness   David Sanchez is a 43-year-old male who presents to clinic today as a new patient for cardiology evaluation.    He was referred by primary for further evaluation of chest discomfort.  He states symptoms have been ongoing for some time.  He complains of a sharp discomfort in the left side of his chest that sometimes will go under his arm into his axillary area.  He states occasionally Tums will help with his symptoms, his PCP recently started Prilosec which he feels has helped. He denies nausea, vomiting or diaphoresis.  He states part workup many years ago that was okay per patient report.  He denies worsening symptoms with activity.  He is a non-smoker.  Family history significant for paternal grandmother with CABG.  He reports longstanding tachycardia.  He states he even quit wearing his Apple Watch as it would make him nervous when his heart rate would elevate into the 120s at rest.  He denies any previous diagnosis of arrhythmia.  Recent labs with a normal TSH and CBC.  He does consume some caffeine.    Cardiac risk factors DM currently managed by primary on oral medications and most recent hemoglobin A1c was 7.9. Hyperlipidemia recently restarted Crestor.  He reports previously being on statin therapy but discontinued due to myalgias.  He has had both an intolerance to Lipitor and Crestor.  He recently restarted the Crestor and has felt some myalgias but is willing to continue to see if the myalgias persist.  Hypertension currently on lisinopril.  He denies home monitoring.  He denies medication side effects and reports compliance with medicine.      Patient Active Problem List   Diagnosis    Type 2 diabetes mellitus    Hypercholesterolemia    Essential hypertension    Screening for colon cancer     Past Medical  History:   Diagnosis Date    Colon polyp     Disorder associated with type 2 diabetes mellitus     Elevated cholesterol     Hypercholesterolemia     Hypertension     Type 2 diabetes mellitus      Past Surgical History:   Procedure Laterality Date    APPENDECTOMY      COLONOSCOPY N/A 2023    Procedure: COLONOSCOPY;  Surgeon: Satnam Garnett MD;  Location: [facility];  Service: Gastroenterology;  Laterality: N/A;    EYE SURGERY      LASIK       Family History   Problem Relation Age of Onset    Diabetes Father     Diabetes Maternal Grandmother      Social History     Tobacco Use    Smoking status: Former     Current packs/day: 0.00     Average packs/day: 0.3 packs/day for 7.0 years (1.8 ttl pk-yrs)     Types: Cigarettes, Electronic Cigarette     Start date: 2000     Quit date: 2008     Years since quittin.4    Smokeless tobacco: Never   Vaping Use    Vaping status: Never Used   Substance Use Topics    Alcohol use: Yes     Alcohol/week: 10.0 standard drinks of alcohol     Types: 10 Cans of beer per week     Comment: occ    Drug use: Never     The following portions of the patient's history were reviewed and updated as appropriate: allergies, current medications, past family history, past medical history, past social history, past surgical history and problem list.    No Known Allergies      Current Outpatient Medications:     Cholecalciferol 25 MCG (1000 UT) tablet, Take 1 tablet by mouth Daily., Disp: , Rfl:     dapagliflozin Propanediol (Farxiga) 10 MG tablet, Take 1 tablet by mouth Daily., Disp: 21 tablet, Rfl: 7    ibuprofen (ADVIL,MOTRIN) 800 MG tablet, Take 1 tablet by mouth Every 8 (Eight) Hours As Needed for pain., Disp: 20 tablet, Rfl: 1    Lancets (OneTouch Delica Plus [id number]) misc, Use to test blood sugar twice daily, Disp: 100 each, Rfl: 5    lisinopril (PRINIVIL,ZESTRIL) 5 MG tablet, Take 1 tablet by mouth Daily., Disp: 21 tablet, Rfl: 7    loratadine (CLARITIN) 10 MG tablet,  Take 1 tablet by mouth Daily., Disp: , Rfl:     magnesium oxide (MAG-OX) 400 MG tablet, Take 1 tablet by mouth Daily., Disp: , Rfl:     metFORMIN ER (GLUCOPHAGE-XR) 500 MG 24 hr tablet, Take 2 tablets by mouth 2 (Two) Times a Day., Disp: 84 tablet, Rfl: 7    omeprazole (priLOSEC) 40 MG capsule, Take 1 capsule by mouth Daily., Disp: 30 capsule, Rfl: 2    OneTouch Verio test strip, Use to test blood sugar once daily (Patient taking differently: Use to test blood sugar once daily), Disp: 100 each, Rfl: 3    rosuvastatin (Crestor) 5 MG tablet, Take 1 tablet by mouth Daily., Disp: 30 tablet, Rfl: 2    SITagliptin (Januvia) 100 MG tablet, Take 1 tablet by mouth Daily., Disp: 90 tablet, Rfl: 3    Thiamine Mononitrate (B1) 100 MG tablet, Take 100 mg by mouth Daily., Disp: , Rfl:     sildenafil (Viagra) 50 MG tablet, Take 1/2 tablet by mouth Daily As Needed for Erectile Dysfunction. (Patient not taking: Reported on 5/20/2024), Disp: 30 tablet, Rfl: 0    Review of Systems   Constitutional:  Negative for activity change, appetite change, chills, diaphoresis, fatigue and fever.   HENT:  Negative for congestion, drooling, ear discharge, ear pain, mouth sores, nosebleeds, postnasal drip, rhinorrhea, sinus pressure, sneezing and sore throat.    Eyes:  Negative for pain, discharge and visual disturbance.   Respiratory:  Positive for shortness of breath. Negative for cough, chest tightness and wheezing.    Cardiovascular:  Positive for chest pain. Negative for palpitations and leg swelling.   Gastrointestinal:  Negative for abdominal pain, constipation, diarrhea, nausea and vomiting.   Endocrine: Negative for cold intolerance, heat intolerance, polydipsia, polyphagia and polyuria.   Musculoskeletal:  Negative for arthralgias, myalgias and neck pain.   Skin:  Negative for rash and wound.   Neurological:  Negative for dizziness, syncope, speech difficulty, weakness, light-headedness and headaches.   Hematological:  Negative for  "adenopathy. Does not bruise/bleed easily.   Psychiatric/Behavioral:  Negative for confusion, dysphoric mood and sleep disturbance. The patient is not nervous/anxious.    All other systems reviewed and are negative.    /86 (BP Location: Left arm, Patient Position: Sitting, Cuff Size: Adult)   Pulse 96   Ht 190.5 cm (75\")   Wt 93.9 kg (207 lb)   SpO2 98%   BMI 25.87 kg/m²     Objective   No Known Allergies    Physical Exam  Vitals reviewed.   Constitutional:       Appearance: Normal appearance. He is well-developed.   HENT:      Head: Normocephalic.   Eyes:      Conjunctiva/sclera: Conjunctivae normal.   Neck:      Thyroid: No thyromegaly.      Vascular: No carotid bruit or JVD.   Cardiovascular:      Rate and Rhythm: Normal rate and regular rhythm.   Pulmonary:      Effort: Pulmonary effort is normal.      Breath sounds: Normal breath sounds.   Musculoskeletal:      Cervical back: Neck supple.      Right lower leg: No edema.      Left lower leg: No edema.   Skin:     General: Skin is warm and dry.   Neurological:      Mental Status: He is alert and oriented to person, place, and time.   Psychiatric:         Attention and Perception: Attention normal.         Mood and Affect: Mood normal.         Speech: Speech normal.         Behavior: Behavior normal. Behavior is cooperative.         Cognition and Memory: Cognition normal.           ECG 12 Lead    Date/Time: 5/24/2024 10:30 AM  Performed by: Shirin Grigsby APRN    Authorized by: Shirin Grigsby APRN  Comparison: not compared with previous ECG   Previous ECG: no previous ECG available  Rhythm: sinus rhythm  Rate: normal  BPM: 96  Other findings: left ventricular hypertrophy    Clinical impression: non-specific ECG  Comments: QT/QTc - 352/406          LABS  WBC   Date Value Ref Range Status   05/20/2024 5.76 3.40 - 10.80 10*3/mm3 Final     RBC   Date Value Ref Range Status   05/20/2024 5.44 4.14 - 5.80 10*6/mm3 Final     Hemoglobin   Date Value Ref " Range Status   05/20/2024 16.4 13.0 - 17.7 g/dL Final     Hematocrit   Date Value Ref Range Status   05/20/2024 46.1 37.5 - 51.0 % Final     MCV   Date Value Ref Range Status   05/20/2024 84.7 79.0 - 97.0 fL Final     MCH   Date Value Ref Range Status   05/20/2024 30.1 26.6 - 33.0 pg Final     MCHC   Date Value Ref Range Status   05/20/2024 35.6 31.5 - 35.7 g/dL Final     RDW   Date Value Ref Range Status   05/20/2024 12.7 12.3 - 15.4 % Final     RDW-SD   Date Value Ref Range Status   05/20/2024 38.7 37.0 - 54.0 fl Final     MPV   Date Value Ref Range Status   05/20/2024 12.2 (H) 6.0 - 12.0 fL Final     Platelets   Date Value Ref Range Status   05/20/2024 242 140 - 450 10*3/mm3 Final       Total Cholesterol   Date Value Ref Range Status   05/20/2024 210 (H) 0 - 200 mg/dL Final     Triglycerides   Date Value Ref Range Status   05/20/2024 318 (H) 0 - 150 mg/dL Final     HDL Cholesterol   Date Value Ref Range Status   05/20/2024 43 40 - 60 mg/dL Final     LDL Cholesterol    Date Value Ref Range Status   05/20/2024 112 (H) 0 - 100 mg/dL Final     LDL Chol Calc (NIH)   Date Value Ref Range Status   10/07/2022 146 (H) 0 - 99 mg/dL Final           Assessment & Plan   Diagnoses and all orders for this visit:    1. Chest pain, unspecified type (Primary)  -     ECG 12 Lead  -     Stress Test With Myocardial Perfusion (1 Day); Future  EKG reviewed and discussed, no acute changes  Further evaluation recommended  Recommend daily aspirin until workup complete  Advised if new or worsening symptoms while awaiting workup, go to the ER.  Expresses understanding    2. Dyspnea, unspecified type  -     Adult Transthoracic Echo Complete W/ Cont if Necessary Per Protocol; Future  Further evaluation will be arranged    3. Tachycardia   Further evaluation will be arranged, patient will return after the holiday weekend to have a monitor placed for 7 days.  Recent TSH and CBC were normal.    4. Essential hypertension  Well-controlled,  continue current therapy  Sodium restrictions recommend  Routine monitoring recommended    5. Hypercholesterolemia  Continue on statin, closely monitor liver enzymes if he continues to experience myalgias consider PCSK9 inhibitors or Leqvio.    6. Type 2 diabetes mellitus with hyperglycemia, without long-term current use of insulin  Recommend tight glycemic control for overall health benefit    Review of medical record    Lifestyle modifications including heart healthy diet, regular exercise, maintenance of desirable body weight and avoidance of tobacco products    Follow-up in 4 to 6 weeks to discuss and review testing, sooner if needed

## 2024-06-03 DIAGNOSIS — R00.0 TACHYCARDIA: Primary | ICD-10-CM

## 2024-06-07 ENCOUNTER — HOSPITAL ENCOUNTER (OUTPATIENT)
Dept: CARDIOLOGY | Facility: HOSPITAL | Age: 44
Discharge: HOME | End: 2024-06-07
Payer: COMMERCIAL

## 2024-06-07 ENCOUNTER — HOSPITAL ENCOUNTER (OUTPATIENT)
Dept: NUCLEAR MEDICINE | Facility: HOSPITAL | Age: 44
Discharge: HOME | End: 2024-06-07
Payer: COMMERCIAL

## 2024-06-07 DIAGNOSIS — R07.9 CHEST PAIN, UNSPECIFIED TYPE: ICD-10-CM

## 2024-06-07 DIAGNOSIS — R06.00 DYSPNEA, UNSPECIFIED TYPE: ICD-10-CM

## 2024-06-07 LAB
BH CV ECHO MEAS - ACS: 1.8 CM
BH CV ECHO MEAS - AO MAX PG: 7.1 MMHG
BH CV ECHO MEAS - AO MEAN PG: 5 MMHG
BH CV ECHO MEAS - AO ROOT DIAM: 3.6 CM
BH CV ECHO MEAS - AO V2 MAX: 133 CM/SEC
BH CV ECHO MEAS - AO V2 VTI: 23.5 CM
BH CV ECHO MEAS - EDV(CUBED): 175.6 ML
BH CV ECHO MEAS - EDV(MOD-SP4): 100 ML
BH CV ECHO MEAS - EF(MOD-SP4): 44.8 %
BH CV ECHO MEAS - ESV(CUBED): 85.2 ML
BH CV ECHO MEAS - ESV(MOD-SP4): 55.2 ML
BH CV ECHO MEAS - FS: 21.4 %
BH CV ECHO MEAS - IVS/LVPW: 1 CM
BH CV ECHO MEAS - IVSD: 0.9 CM
BH CV ECHO MEAS - LA DIMENSION: 3.4 CM
BH CV ECHO MEAS - LAT PEAK E' VEL: 12.9 CM/SEC
BH CV ECHO MEAS - LV DIASTOLIC VOL/BSA (35-75): 44.9 CM2
BH CV ECHO MEAS - LV MASS(C)D: 191.6 GRAMS
BH CV ECHO MEAS - LV SYSTOLIC VOL/BSA (12-30): 24.8 CM2
BH CV ECHO MEAS - LVIDD: 5.6 CM
BH CV ECHO MEAS - LVIDS: 4.4 CM
BH CV ECHO MEAS - LVOT AREA: 4.2 CM2
BH CV ECHO MEAS - LVOT DIAM: 2.3 CM
BH CV ECHO MEAS - LVPWD: 0.9 CM
BH CV ECHO MEAS - MED PEAK E' VEL: 7.8 CM/SEC
BH CV ECHO MEAS - MV A MAX VEL: 72.2 CM/SEC
BH CV ECHO MEAS - MV E MAX VEL: 62.9 CM/SEC
BH CV ECHO MEAS - MV E/A: 0.87
BH CV ECHO MEAS - PA ACC TIME: 0.12 SEC
BH CV ECHO MEAS - SV(MOD-SP4): 44.8 ML
BH CV ECHO MEAS - SVI(MOD-SP4): 20.1 ML/M2
BH CV ECHO MEASUREMENTS AVERAGE E/E' RATIO: 6.08
BH CV NUCLEAR PRIOR STUDY: 3
BH CV REST NUCLEAR ISOTOPE DOSE: 10.4 MCI
BH CV STRESS BP STAGE 1: NORMAL
BH CV STRESS BP STAGE 2: NORMAL
BH CV STRESS BP STAGE 3: NORMAL
BH CV STRESS COMMENTS STAGE 1: NORMAL
BH CV STRESS DOSE REGADENOSON STAGE 1: 0.4
BH CV STRESS DURATION MIN STAGE 1: 3
BH CV STRESS DURATION MIN STAGE 2: 3
BH CV STRESS DURATION MIN STAGE 3: 3
BH CV STRESS DURATION SEC STAGE 1: 0
BH CV STRESS DURATION SEC STAGE 2: 0
BH CV STRESS DURATION SEC STAGE 3: 0
BH CV STRESS DURATION SEC STAGE 4: 31
BH CV STRESS GRADE STAGE 1: 10
BH CV STRESS GRADE STAGE 2: 12
BH CV STRESS GRADE STAGE 3: 14
BH CV STRESS GRADE STAGE 4: 16
BH CV STRESS HR STAGE 1: 118
BH CV STRESS HR STAGE 2: 131
BH CV STRESS HR STAGE 3: 150
BH CV STRESS HR STAGE 4: 153
BH CV STRESS METS STAGE 1: 5
BH CV STRESS METS STAGE 2: 7.5
BH CV STRESS METS STAGE 3: 10
BH CV STRESS METS STAGE 4: 13.5
BH CV STRESS NUCLEAR ISOTOPE DOSE: 30.4 MCI
BH CV STRESS PROTOCOL 1: NORMAL
BH CV STRESS RECOVERY BP: NORMAL MMHG
BH CV STRESS RECOVERY HR: 103 BPM
BH CV STRESS SPEED STAGE 1: 1.7
BH CV STRESS SPEED STAGE 2: 2.5
BH CV STRESS SPEED STAGE 3: 3.4
BH CV STRESS SPEED STAGE 4: 4.2
BH CV STRESS STAGE 1: 1
BH CV STRESS STAGE 2: 2
BH CV STRESS STAGE 3: 3
BH CV STRESS STAGE 4: 4
LEFT ATRIUM VOLUME INDEX: 9.7 ML/M2
LV EF NUC BP: 34 %
MAXIMAL PREDICTED HEART RATE: 177 BPM
PERCENT MAX PREDICTED HR: 86.44 %
STRESS BASELINE BP: NORMAL MMHG
STRESS BASELINE HR: 98 BPM
STRESS PERCENT HR: 102 %
STRESS POST ESTIMATED WORKLOAD: 11.8 METS
STRESS POST EXERCISE DUR MIN: 9 MIN
STRESS POST EXERCISE DUR SEC: 31 SEC
STRESS POST PEAK BP: NORMAL MMHG
STRESS POST PEAK HR: 153 BPM
STRESS TARGET HR: 150 BPM

## 2024-06-07 PROCEDURE — A9500 TC99M SESTAMIBI: HCPCS | Performed by: FAMILY MEDICINE

## 2024-06-07 PROCEDURE — 0 TECHNETIUM SESTAMIBI: Performed by: FAMILY MEDICINE

## 2024-06-07 PROCEDURE — 93017 CV STRESS TEST TRACING ONLY: CPT

## 2024-06-07 PROCEDURE — 78452 HT MUSCLE IMAGE SPECT MULT: CPT

## 2024-06-07 PROCEDURE — 93306 TTE W/DOPPLER COMPLETE: CPT

## 2024-06-07 RX ADMIN — TECHNETIUM TC 99M SESTAMIBI 1 DOSE: 1 INJECTION INTRAVENOUS at 10:10

## 2024-06-07 RX ADMIN — TECHNETIUM TC 99M SESTAMIBI 1 DOSE: 1 INJECTION INTRAVENOUS at 11:22

## 2024-06-13 ENCOUNTER — OFFICE VISIT (OUTPATIENT)
Dept: CARDIOLOGY | Facility: CLINIC | Age: 44
End: 2024-06-13
Payer: COMMERCIAL

## 2024-06-13 VITALS
BODY MASS INDEX: 25.47 KG/M2 | SYSTOLIC BLOOD PRESSURE: 124 MMHG | DIASTOLIC BLOOD PRESSURE: 82 MMHG | HEIGHT: 75 IN | OXYGEN SATURATION: 97 % | WEIGHT: 204.8 LBS | HEART RATE: 100 BPM

## 2024-06-13 DIAGNOSIS — E11.65 TYPE 2 DIABETES MELLITUS WITH HYPERGLYCEMIA, WITHOUT LONG-TERM CURRENT USE OF INSULIN: ICD-10-CM

## 2024-06-13 DIAGNOSIS — I10 ESSENTIAL HYPERTENSION: ICD-10-CM

## 2024-06-13 DIAGNOSIS — Z91.89 MULTIPLE RISK FACTORS FOR CORONARY ARTERY DISEASE: ICD-10-CM

## 2024-06-13 DIAGNOSIS — I47.10 PSVT (PAROXYSMAL SUPRAVENTRICULAR TACHYCARDIA): ICD-10-CM

## 2024-06-13 DIAGNOSIS — E78.00 HYPERCHOLESTEROLEMIA: ICD-10-CM

## 2024-06-13 DIAGNOSIS — I51.9 LV DYSFUNCTION: Primary | ICD-10-CM

## 2024-06-13 RX ORDER — METOPROLOL SUCCINATE 25 MG/1
12.5 TABLET, EXTENDED RELEASE ORAL DAILY
Qty: 30 TABLET | Refills: 3 | Status: SHIPPED | OUTPATIENT
Start: 2024-06-13

## 2024-06-13 RX ORDER — METOPROLOL TARTRATE 50 MG/1
TABLET, FILM COATED ORAL
Qty: 2 TABLET | Refills: 0 | Status: SHIPPED | OUTPATIENT
Start: 2024-06-13

## 2024-06-13 RX ORDER — CITALOPRAM HYDROBROMIDE 10 MG/1
10 TABLET ORAL DAILY
COMMUNITY

## 2024-06-13 NOTE — LETTER
June 21, 2024     Mitch Reed,   [address] [place] [address]    Patient: David Sanchez   YOB: 1980   Date of Visit: 6/13/2024       Dear Mitch Reed,     David Sanchez was in my office today. Below is a copy of my note.    If you have questions, please do not hesitate to call me. I look forward to following David along with you.         Sincerely,        AKIL Leigh        CC: No Recipients    Subjective    David Sanchez is a 43 y.o. male.   Chief Complaint   Patient presents with   • Results     HOLTER MONITOR, STRESS TEST, ECHO      History of Present Illness   David Sanchez is a 43-year-old male who presents to clinic today for cardiology follow-up.    He was initially seen by myself for further cardiac evaluation due to intermittent chest discomfort.  He has completed testing and returns today to discuss results.  Symptoms are unchanged. He denies worsening symptoms with activity.  He is a non-smoker.  Family history significant for paternal grandmother with CABG. He recently started Celexa which he feels has helped with a lot of symptoms.     Longstanding tachycardia, recently completed a Holter monitor which she would like to discuss today.  He denies worsening palpitations, dizziness, lightheaded to be. He denies any previous diagnosis of arrhythmia.  Recent labs with a normal TSH and CBC.  He does consume some caffeine.     DM currently managed by primary on oral medications and most recent hemoglobin A1c was 7.9.     Hyperlipidemia recently restarted Crestor.  He reports previously being on statin therapy but discontinued due to myalgias.  He has had both an intolerance to Lipitor and Crestor.  He recently restarted the Crestor and has felt some myalgias but is willing to continue to see if the myalgias persist.      Hypertension currently on lisinopril.  He denies home monitoring.  He denies medication side effects and reports compliance with  medicine.    Patient Active Problem List   Diagnosis   • Type 2 diabetes mellitus   • Hypercholesterolemia   • Essential hypertension   • Screening for colon cancer     Past Medical History:   Diagnosis Date   • Colon polyp    • Disorder associated with type 2 diabetes mellitus    • Elevated cholesterol    • Hypercholesterolemia    • Hypertension    • Type 2 diabetes mellitus      Past Surgical History:   Procedure Laterality Date   • APPENDECTOMY     • COLONOSCOPY N/A 2023    Procedure: COLONOSCOPY;  Surgeon: Satnam Garnett MD;  Location: [facility];  Service: Gastroenterology;  Laterality: N/A;   • EYE SURGERY     • LASIK         Family History   Problem Relation Age of Onset   • Diabetes Father    • Diabetes Maternal Grandmother      Social History     Tobacco Use   • Smoking status: Former     Current packs/day: 0.00     Average packs/day: 0.3 packs/day for 7.0 years (1.8 ttl pk-yrs)     Types: Cigarettes, Electronic Cigarette     Start date: 2000     Quit date: 2008     Years since quittin.4   • Smokeless tobacco: Never   Vaping Use   • Vaping status: Never Used   Substance Use Topics   • Alcohol use: Yes     Alcohol/week: 10.0 standard drinks of alcohol     Types: 10 Cans of beer per week     Comment: occ   • Drug use: Never         The following portions of the patient's history were reviewed and updated as appropriate: allergies, current medications, past family history, past medical history, past social history, past surgical history and problem list.    No Known Allergies      Current Outpatient Medications:   •  Cholecalciferol 25 MCG (1000 UT) tablet, Take 1 tablet by mouth Daily., Disp: , Rfl:   •  citalopram (CeleXA) 10 MG tablet, Take 1 tablet by mouth Daily., Disp: , Rfl:   •  dapagliflozin Propanediol (Farxiga) 10 MG tablet, Take 1 tablet by mouth Daily., Disp: 21 tablet, Rfl: 7  •  ibuprofen (ADVIL,MOTRIN) 800 MG tablet, Take 1 tablet by mouth Every 8 (Eight) Hours As  Needed for pain., Disp: 20 tablet, Rfl: 1  •  Lancets (OneTouch Delica Plus [id number]) misc, Use to test blood sugar twice daily, Disp: 100 each, Rfl: 5  •  lisinopril (PRINIVIL,ZESTRIL) 5 MG tablet, Take 1 tablet by mouth Daily., Disp: 21 tablet, Rfl: 7  •  loratadine (CLARITIN) 10 MG tablet, Take 1 tablet by mouth Daily., Disp: , Rfl:   •  magnesium oxide (MAG-OX) 400 MG tablet, Take 1 tablet by mouth Daily., Disp: , Rfl:   •  metFORMIN ER (GLUCOPHAGE-XR) 500 MG 24 hr tablet, Take 2 tablets by mouth 2 (Two) Times a Day., Disp: 84 tablet, Rfl: 7  •  omeprazole (priLOSEC) 40 MG capsule, Take 1 capsule by mouth Daily., Disp: 30 capsule, Rfl: 2  •  OneTouch Verio test strip, Use to test blood sugar once daily (Patient taking differently: Use to test blood sugar once daily), Disp: 100 each, Rfl: 3  •  rosuvastatin (Crestor) 5 MG tablet, Take 1 tablet by mouth Daily., Disp: 30 tablet, Rfl: 2  •  sildenafil (Viagra) 50 MG tablet, Take 1/2 tablet by mouth Daily As Needed for Erectile Dysfunction., Disp: 30 tablet, Rfl: 0  •  SITagliptin (Januvia) 100 MG tablet, Take 1 tablet by mouth Daily., Disp: 90 tablet, Rfl: 3  •  Thiamine Mononitrate (B1) 100 MG tablet, Take 100 mg by mouth Daily., Disp: , Rfl:   •  metoprolol succinate XL (TOPROL-XL) 25 MG 24 hr tablet, Take ½ tablet by mouth Daily., Disp: 30 tablet, Rfl: 3  •  metoprolol tartrate (LOPRESSOR) 50 MG tablet, If heart rate less than 60, hold medication. If heart rate 60-75 & systolic BP is greater than 120, take 1 tab one hour prior to appt, if heart rate greater than 75 & systolic BP greater than 120, take 2 tabs one hour prior to appt., Disp: 2 tablet, Rfl: 0    Review of Systems   Constitutional:  Negative for activity change, appetite change, chills, diaphoresis, fatigue and fever.   HENT:  Negative for congestion, drooling, ear discharge, ear pain, mouth sores, nosebleeds, postnasal drip, rhinorrhea, sinus pressure, sneezing and sore throat.    Eyes:  Negative  "for pain, discharge and visual disturbance.   Respiratory:  Negative for cough, chest tightness, shortness of breath and wheezing.    Cardiovascular:  Positive for chest pain and palpitations. Negative for leg swelling.   Gastrointestinal:  Negative for abdominal pain, constipation, diarrhea, nausea and vomiting.   Endocrine: Negative for cold intolerance, heat intolerance, polydipsia, polyphagia and polyuria.   Musculoskeletal:  Negative for arthralgias, myalgias and neck pain.   Skin:  Negative for rash and wound.   Neurological:  Negative for dizziness, syncope, speech difficulty, weakness, light-headedness and headaches.   Hematological:  Negative for adenopathy. Does not bruise/bleed easily.   Psychiatric/Behavioral:  Negative for confusion, dysphoric mood and sleep disturbance. The patient is not nervous/anxious.    All other systems reviewed and are negative.      /82 (BP Location: Left arm, Patient Position: Sitting, Cuff Size: Adult)   Pulse 100   Ht 190.5 cm (75\")   Wt 92.9 kg (204 lb 12.8 oz)   SpO2 97%   BMI 25.60 kg/m²     Objective  No Known Allergies    Physical Exam  Vitals reviewed.   Constitutional:       Appearance: Normal appearance. He is well-developed.   HENT:      Head: Normocephalic.   Eyes:      Conjunctiva/sclera: Conjunctivae normal.   Neck:      Thyroid: No thyromegaly.      Vascular: No carotid bruit or JVD.   Cardiovascular:      Rate and Rhythm: Regular rhythm. Tachycardia present.   Pulmonary:      Effort: Pulmonary effort is normal.      Breath sounds: Normal breath sounds.   Musculoskeletal:      Cervical back: Neck supple.      Right lower leg: No edema.      Left lower leg: No edema.   Skin:     General: Skin is warm and dry.   Neurological:      Mental Status: He is alert and oriented to person, place, and time.   Psychiatric:         Attention and Perception: Attention normal.         Mood and Affect: Mood normal.         Speech: Speech normal.         Behavior: " Behavior normal. Behavior is cooperative.         Cognition and Memory: Cognition normal.             LABS  WBC   Date Value Ref Range Status   05/20/2024 5.76 3.40 - 10.80 10*3/mm3 Final     RBC   Date Value Ref Range Status   05/20/2024 5.44 4.14 - 5.80 10*6/mm3 Final     Hemoglobin   Date Value Ref Range Status   05/20/2024 16.4 13.0 - 17.7 g/dL Final     Hematocrit   Date Value Ref Range Status   05/20/2024 46.1 37.5 - 51.0 % Final     MCV   Date Value Ref Range Status   05/20/2024 84.7 79.0 - 97.0 fL Final     MCH   Date Value Ref Range Status   05/20/2024 30.1 26.6 - 33.0 pg Final     MCHC   Date Value Ref Range Status   05/20/2024 35.6 31.5 - 35.7 g/dL Final     RDW   Date Value Ref Range Status   05/20/2024 12.7 12.3 - 15.4 % Final     RDW-SD   Date Value Ref Range Status   05/20/2024 38.7 37.0 - 54.0 fl Final     MPV   Date Value Ref Range Status   05/20/2024 12.2 (H) 6.0 - 12.0 fL Final     Platelets   Date Value Ref Range Status   05/20/2024 242 140 - 450 10*3/mm3 Final       Total Cholesterol   Date Value Ref Range Status   05/20/2024 210 (H) 0 - 200 mg/dL Final     Triglycerides   Date Value Ref Range Status   05/20/2024 318 (H) 0 - 150 mg/dL Final     HDL Cholesterol   Date Value Ref Range Status   05/20/2024 43 40 - 60 mg/dL Final     LDL Cholesterol    Date Value Ref Range Status   05/20/2024 112 (H) 0 - 100 mg/dL Final     LDL Chol Calc (NIH)   Date Value Ref Range Status   10/07/2022 146 (H) 0 - 99 mg/dL Final     Echocardiogram   Interpretation Summary     •  Normal left ventricular cavity size and wall thickness noted.  •  Left ventricular systolic function is mildly decreased.  Mild global LV hypokinesis noted left ventricular ejection fraction appears to be 46 - 50%.  •  Left ventricular diastolic function is consistent with (grade I) impaired relaxation.  •  No significant valvular heart disease detected.  •  No pericardial effusion noted.    Nuclear Stress Test   Interpretation Summary     •   A stress test was performed following the [facility] protocol.  •  Resting EKG showed sinus rhythm at a rate of 98 bpm nonspecific ST and T wave changes were noted.  •  Patient walked 9:31 on treadmill reaching target heart rate, workload at peak exercise was 11.8 METS patient denied any chest discomfort during exercise  •  Heart rate and blood pressure response was appropriate.  Recovery was normal.  •  ST segments did not show any diagnostic changes.  No significant arrhythmia detected.  •  Findings consistent with a normal ECG stress test.  •  Myocardial perfusion imaging indicates a normal myocardial perfusion study with no evidence of ischemia.  •  Left ventricular ejection fraction is moderately reduced (Calculated EF = 34%).  •  Low risk for ischemic heart disease.  •  There is no prior study available for comparison.    Holter Monitor   Interpretation Summary     •  An abnormal monitor study.  •  The patient was monitored for 6 days 14 hours and 49 minutes.  •  The predominant rhythm noted during the testing period was sinus rhythm.  •  Average HR: 101. Min HR: 63. Max HR: 201.  •  Occasional PACs and atrial couplets noted .PAC burden was less than 1%.  •  Occasional PVCs and ventricular couplets noted PVC burden was less than 1%  •  No atrial fibrillation/flutter or pause detected.  •  Multiple runs of atrial tachycardia noted the fastest event was 201 bpm and longest event was 29 beat run.  •  Patient recorded 2 events.  No EKG correlation noted sinus tachycardia at a rate of 130 bpm was noted.                Assessment & Plan  Diagnoses and all orders for this visit:    1. LV dysfunction (Primary)  -     Basic Metabolic Panel; Future  -     BNP; Future  -     Magnesium; Future  Discussed and reviewed echocardiogram and nuclear stress test.  Discussed variation in calculation of LVEF of stress test and echocardiogram.  Discussed and recommended further workup of cardiac cath due to change in LVEF. Discussed  alternative treatment options of coronary CT versus medical management.  Discussed risk and benefits of each treatment option.  Will advance guideline directed medical therapy as patient tolerates.  Start metoprolol XL and increase as tolerated  Continue on lisinopril consider transition to Entresto in the future if tolerated  Patient remains on Farxiga, will plan to continue    2. PSVT (paroxysmal supraventricular tachycardia)  Discussed and reviewed Holter monitor, start metoprolol and advance as tolerated  Recommend avoidance of caffeine/stimulants    3. Essential hypertension  Well-controlled, continue current therapy    4. Hypercholesterolemia  Continue on statin, heart healthy diet, LDL goal less than 70    5. Type 2 diabetes mellitus with hyperglycemia, without long-term current use of insulin  Recommend tight glycemic control for overall health benefit, management by primary    6. Multiple risk factors for coronary artery disease  Discussed and reviewed recent testing, will continue to monitor with further workup as warranted    Other orders  -     metoprolol succinate XL (TOPROL-XL) 25 MG 24 hr tablet; Take ½ tablet by mouth Daily.  Dispense: 30 tablet; Refill: 3  -     metoprolol tartrate (LOPRESSOR) 50 MG tablet; If heart rate less than 60, hold medication. If heart rate 60-75 & systolic BP is greater than 120, take 1 tab one hour prior to appt, if heart rate greater than 75 & systolic BP greater than 120, take 2 tabs one hour prior to appt.  Dispense: 2 tablet; Refill: 0        Review of medical record including discussion of recent test results    Lifestyle modifications including heart healthy diet, regular exercise, maintenance of desirable body weight and avoidance of tobacco products    Follow-up in 6 weeks, sooner if needed.

## 2024-06-14 ENCOUNTER — LAB (OUTPATIENT)
Dept: FAMILY MEDICINE CLINIC | Facility: CLINIC | Age: 44
End: 2024-06-14
Payer: COMMERCIAL

## 2024-06-14 DIAGNOSIS — I51.9 LV DYSFUNCTION: ICD-10-CM

## 2024-06-14 LAB
ANION GAP SERPL CALCULATED.3IONS-SCNC: 13.7 MMOL/L (ref 5–15)
BUN SERPL-MCNC: 18 MG/DL (ref 6–20)
BUN/CREAT SERPL: 17.5 (ref 7–25)
CALCIUM SPEC-SCNC: 9.4 MG/DL (ref 8.6–10.5)
CHLORIDE SERPL-SCNC: 103 MMOL/L (ref 98–107)
CO2 SERPL-SCNC: 23.3 MMOL/L (ref 22–29)
CREAT SERPL-MCNC: 1.03 MG/DL (ref 0.76–1.27)
EGFRCR SERPLBLD CKD-EPI 2021: 92.4 ML/MIN/1.73
GLUCOSE SERPL-MCNC: 215 MG/DL (ref 65–99)
MAGNESIUM SERPL-MCNC: 1.8 MG/DL (ref 1.6–2.6)
NT-PROBNP SERPL-MCNC: 58.5 PG/ML (ref 0–450)
POTASSIUM SERPL-SCNC: 4.2 MMOL/L (ref 3.5–5.2)
SODIUM SERPL-SCNC: 140 MMOL/L (ref 136–145)

## 2024-06-14 PROCEDURE — 36415 COLL VENOUS BLD VENIPUNCTURE: CPT

## 2024-06-14 PROCEDURE — 83735 ASSAY OF MAGNESIUM: CPT | Performed by: NURSE PRACTITIONER

## 2024-06-14 PROCEDURE — 80048 BASIC METABOLIC PNL TOTAL CA: CPT | Performed by: NURSE PRACTITIONER

## 2024-06-14 PROCEDURE — 83880 ASSAY OF NATRIURETIC PEPTIDE: CPT | Performed by: NURSE PRACTITIONER

## 2024-06-17 ENCOUNTER — TELEPHONE (OUTPATIENT)
Dept: CARDIOLOGY | Facility: CLINIC | Age: 44
End: 2024-06-17
Payer: COMMERCIAL

## 2024-06-17 NOTE — TELEPHONE ENCOUNTER
----- Message from Shirin Grigsby sent at 6/17/2024  1:51 PM EDT -----  BMP - creatinine is normal, electrolytes are normal   BNP - no evidence of acute heart failure   MAgnesium is normal     Continue with current plan of care

## 2024-06-18 ENCOUNTER — SPECIALTY PHARMACY (OUTPATIENT)
Dept: PHARMACY | Facility: HOSPITAL | Age: 44
End: 2024-06-18
Payer: COMMERCIAL

## 2024-06-20 ENCOUNTER — TELEPHONE (OUTPATIENT)
Dept: CARDIOLOGY | Facility: CLINIC | Age: 44
End: 2024-06-20

## 2024-06-20 DIAGNOSIS — R07.9 CHEST PAIN, UNSPECIFIED TYPE: Primary | ICD-10-CM

## 2024-06-20 NOTE — PROGRESS NOTES
Subjective     David Sanchez is a 43 y.o. male.   Chief Complaint   Patient presents with    Results     HOLTER MONITOR, STRESS TEST, ECHO      History of Present Illness   David Sanchez is a 43-year-old male who presents to clinic today for cardiology follow-up.    He was initially seen by myself for further cardiac evaluation due to intermittent chest discomfort.  He has completed testing and returns today to discuss results.  Symptoms are unchanged. He denies worsening symptoms with activity.  He is a non-smoker.  Family history significant for paternal grandmother with CABG. He recently started Celexa which he feels has helped with a lot of symptoms.     Longstanding tachycardia, recently completed a Holter monitor which she would like to discuss today.  He denies worsening palpitations, dizziness, lightheaded to be. He denies any previous diagnosis of arrhythmia.  Recent labs with a normal TSH and CBC.  He does consume some caffeine.     DM currently managed by primary on oral medications and most recent hemoglobin A1c was 7.9.     Hyperlipidemia recently restarted Crestor.  He reports previously being on statin therapy but discontinued due to myalgias.  He has had both an intolerance to Lipitor and Crestor.  He recently restarted the Crestor and has felt some myalgias but is willing to continue to see if the myalgias persist.      Hypertension currently on lisinopril.  He denies home monitoring.  He denies medication side effects and reports compliance with medicine.    Patient Active Problem List   Diagnosis    Type 2 diabetes mellitus    Hypercholesterolemia    Essential hypertension    Screening for colon cancer     Past Medical History:   Diagnosis Date    Colon polyp     Disorder associated with type 2 diabetes mellitus     Elevated cholesterol     Hypercholesterolemia     Hypertension     Type 2 diabetes mellitus 2017     Past Surgical History:   Procedure Laterality Date    APPENDECTOMY       COLONOSCOPY N/A 2023    Procedure: COLONOSCOPY;  Surgeon: Satnam Garnett MD;  Location: [facility];  Service: Gastroenterology;  Laterality: N/A;    EYE SURGERY      LASIK         Family History   Problem Relation Age of Onset    Diabetes Father     Diabetes Maternal Grandmother      Social History     Tobacco Use    Smoking status: Former     Current packs/day: 0.00     Average packs/day: 0.3 packs/day for 7.0 years (1.8 ttl pk-yrs)     Types: Cigarettes, Electronic Cigarette     Start date: 2000     Quit date: 2008     Years since quittin.4    Smokeless tobacco: Never   Vaping Use    Vaping status: Never Used   Substance Use Topics    Alcohol use: Yes     Alcohol/week: 10.0 standard drinks of alcohol     Types: 10 Cans of beer per week     Comment: occ    Drug use: Never         The following portions of the patient's history were reviewed and updated as appropriate: allergies, current medications, past family history, past medical history, past social history, past surgical history and problem list.    No Known Allergies      Current Outpatient Medications:     Cholecalciferol 25 MCG (1000 UT) tablet, Take 1 tablet by mouth Daily., Disp: , Rfl:     citalopram (CeleXA) 10 MG tablet, Take 1 tablet by mouth Daily., Disp: , Rfl:     dapagliflozin Propanediol (Farxiga) 10 MG tablet, Take 1 tablet by mouth Daily., Disp: 21 tablet, Rfl: 7    ibuprofen (ADVIL,MOTRIN) 800 MG tablet, Take 1 tablet by mouth Every 8 (Eight) Hours As Needed for pain., Disp: 20 tablet, Rfl: 1    Lancets (OneTouch Delica Plus [id number]) misc, Use to test blood sugar twice daily, Disp: 100 each, Rfl: 5    lisinopril (PRINIVIL,ZESTRIL) 5 MG tablet, Take 1 tablet by mouth Daily., Disp: 21 tablet, Rfl: 7    loratadine (CLARITIN) 10 MG tablet, Take 1 tablet by mouth Daily., Disp: , Rfl:     magnesium oxide (MAG-OX) 400 MG tablet, Take 1 tablet by mouth Daily., Disp: , Rfl:     metFORMIN ER (GLUCOPHAGE-XR) 500 MG 24 hr tablet,  Take 2 tablets by mouth 2 (Two) Times a Day., Disp: 84 tablet, Rfl: 7    omeprazole (priLOSEC) 40 MG capsule, Take 1 capsule by mouth Daily., Disp: 30 capsule, Rfl: 2    OneTouch Verio test strip, Use to test blood sugar once daily (Patient taking differently: Use to test blood sugar once daily), Disp: 100 each, Rfl: 3    rosuvastatin (Crestor) 5 MG tablet, Take 1 tablet by mouth Daily., Disp: 30 tablet, Rfl: 2    sildenafil (Viagra) 50 MG tablet, Take 1/2 tablet by mouth Daily As Needed for Erectile Dysfunction., Disp: 30 tablet, Rfl: 0    SITagliptin (Januvia) 100 MG tablet, Take 1 tablet by mouth Daily., Disp: 90 tablet, Rfl: 3    Thiamine Mononitrate (B1) 100 MG tablet, Take 100 mg by mouth Daily., Disp: , Rfl:     metoprolol succinate XL (TOPROL-XL) 25 MG 24 hr tablet, Take ½ tablet by mouth Daily., Disp: 30 tablet, Rfl: 3    metoprolol tartrate (LOPRESSOR) 50 MG tablet, If heart rate less than 60, hold medication. If heart rate 60-75 & systolic BP is greater than 120, take 1 tab one hour prior to appt, if heart rate greater than 75 & systolic BP greater than 120, take 2 tabs one hour prior to appt., Disp: 2 tablet, Rfl: 0    Review of Systems   Constitutional:  Negative for activity change, appetite change, chills, diaphoresis, fatigue and fever.   HENT:  Negative for congestion, drooling, ear discharge, ear pain, mouth sores, nosebleeds, postnasal drip, rhinorrhea, sinus pressure, sneezing and sore throat.    Eyes:  Negative for pain, discharge and visual disturbance.   Respiratory:  Negative for cough, chest tightness, shortness of breath and wheezing.    Cardiovascular:  Positive for chest pain and palpitations. Negative for leg swelling.   Gastrointestinal:  Negative for abdominal pain, constipation, diarrhea, nausea and vomiting.   Endocrine: Negative for cold intolerance, heat intolerance, polydipsia, polyphagia and polyuria.   Musculoskeletal:  Negative for arthralgias, myalgias and neck pain.   Skin:   "Negative for rash and wound.   Neurological:  Negative for dizziness, syncope, speech difficulty, weakness, light-headedness and headaches.   Hematological:  Negative for adenopathy. Does not bruise/bleed easily.   Psychiatric/Behavioral:  Negative for confusion, dysphoric mood and sleep disturbance. The patient is not nervous/anxious.    All other systems reviewed and are negative.      /82 (BP Location: Left arm, Patient Position: Sitting, Cuff Size: Adult)   Pulse 100   Ht 190.5 cm (75\")   Wt 92.9 kg (204 lb 12.8 oz)   SpO2 97%   BMI 25.60 kg/m²     Objective   No Known Allergies    Physical Exam  Vitals reviewed.   Constitutional:       Appearance: Normal appearance. He is well-developed.   HENT:      Head: Normocephalic.   Eyes:      Conjunctiva/sclera: Conjunctivae normal.   Neck:      Thyroid: No thyromegaly.      Vascular: No carotid bruit or JVD.   Cardiovascular:      Rate and Rhythm: Regular rhythm. Tachycardia present.   Pulmonary:      Effort: Pulmonary effort is normal.      Breath sounds: Normal breath sounds.   Musculoskeletal:      Cervical back: Neck supple.      Right lower leg: No edema.      Left lower leg: No edema.   Skin:     General: Skin is warm and dry.   Neurological:      Mental Status: He is alert and oriented to person, place, and time.   Psychiatric:         Attention and Perception: Attention normal.         Mood and Affect: Mood normal.         Speech: Speech normal.         Behavior: Behavior normal. Behavior is cooperative.         Cognition and Memory: Cognition normal.             LABS  WBC   Date Value Ref Range Status   05/20/2024 5.76 3.40 - 10.80 10*3/mm3 Final     RBC   Date Value Ref Range Status   05/20/2024 5.44 4.14 - 5.80 10*6/mm3 Final     Hemoglobin   Date Value Ref Range Status   05/20/2024 16.4 13.0 - 17.7 g/dL Final     Hematocrit   Date Value Ref Range Status   05/20/2024 46.1 37.5 - 51.0 % Final     MCV   Date Value Ref Range Status   05/20/2024 84.7 " 79.0 - 97.0 fL Final     MCH   Date Value Ref Range Status   05/20/2024 30.1 26.6 - 33.0 pg Final     MCHC   Date Value Ref Range Status   05/20/2024 35.6 31.5 - 35.7 g/dL Final     RDW   Date Value Ref Range Status   05/20/2024 12.7 12.3 - 15.4 % Final     RDW-SD   Date Value Ref Range Status   05/20/2024 38.7 37.0 - 54.0 fl Final     MPV   Date Value Ref Range Status   05/20/2024 12.2 (H) 6.0 - 12.0 fL Final     Platelets   Date Value Ref Range Status   05/20/2024 242 140 - 450 10*3/mm3 Final       Total Cholesterol   Date Value Ref Range Status   05/20/2024 210 (H) 0 - 200 mg/dL Final     Triglycerides   Date Value Ref Range Status   05/20/2024 318 (H) 0 - 150 mg/dL Final     HDL Cholesterol   Date Value Ref Range Status   05/20/2024 43 40 - 60 mg/dL Final     LDL Cholesterol    Date Value Ref Range Status   05/20/2024 112 (H) 0 - 100 mg/dL Final     LDL Chol Calc (NIH)   Date Value Ref Range Status   10/07/2022 146 (H) 0 - 99 mg/dL Final     Echocardiogram   Interpretation Summary       Normal left ventricular cavity size and wall thickness noted.    Left ventricular systolic function is mildly decreased.  Mild global LV hypokinesis noted left ventricular ejection fraction appears to be 46 - 50%.    Left ventricular diastolic function is consistent with (grade I) impaired relaxation.    No significant valvular heart disease detected.    No pericardial effusion noted.    Nuclear Stress Test   Interpretation Summary       A stress test was performed following the [facility] protocol.    Resting EKG showed sinus rhythm at a rate of 98 bpm nonspecific ST and T wave changes were noted.    Patient walked 9:31 on treadmill reaching target heart rate, workload at peak exercise was 11.8 METS patient denied any chest discomfort during exercise    Heart rate and blood pressure response was appropriate.  Recovery was normal.    ST segments did not show any diagnostic changes.  No significant arrhythmia detected.    Findings  consistent with a normal ECG stress test.    Myocardial perfusion imaging indicates a normal myocardial perfusion study with no evidence of ischemia.    Left ventricular ejection fraction is moderately reduced (Calculated EF = 34%).    Low risk for ischemic heart disease.    There is no prior study available for comparison.    Holter Monitor   Interpretation Summary       An abnormal monitor study.    The patient was monitored for 6 days 14 hours and 49 minutes.    The predominant rhythm noted during the testing period was sinus rhythm.    Average HR: 101. Min HR: 63. Max HR: 201.    Occasional PACs and atrial couplets noted .PAC burden was less than 1%.    Occasional PVCs and ventricular couplets noted PVC burden was less than 1%    No atrial fibrillation/flutter or pause detected.    Multiple runs of atrial tachycardia noted the fastest event was 201 bpm and longest event was 29 beat run.    Patient recorded 2 events.  No EKG correlation noted sinus tachycardia at a rate of 130 bpm was noted.                Assessment & Plan   Diagnoses and all orders for this visit:    1. LV dysfunction (Primary)  -     Basic Metabolic Panel; Future  -     BNP; Future  -     Magnesium; Future  Discussed and reviewed echocardiogram and nuclear stress test.  Discussed variation in calculation of LVEF of stress test and echocardiogram.  Discussed and recommended further workup of cardiac cath due to change in LVEF. Discussed alternative treatment options of coronary CT versus medical management.  Discussed risk and benefits of each treatment option.  Will advance guideline directed medical therapy as patient tolerates.  Start metoprolol XL and increase as tolerated  Continue on lisinopril consider transition to Entresto in the future if tolerated  Patient remains on Farxiga, will plan to continue    2. PSVT (paroxysmal supraventricular tachycardia)  Discussed and reviewed Holter monitor, start metoprolol and advance as  tolerated  Recommend avoidance of caffeine/stimulants    3. Essential hypertension  Well-controlled, continue current therapy    4. Hypercholesterolemia  Continue on statin, heart healthy diet, LDL goal less than 70    5. Type 2 diabetes mellitus with hyperglycemia, without long-term current use of insulin  Recommend tight glycemic control for overall health benefit, management by primary    6. Multiple risk factors for coronary artery disease  Discussed and reviewed recent testing, will continue to monitor with further workup as warranted    Other orders  -     metoprolol succinate XL (TOPROL-XL) 25 MG 24 hr tablet; Take ½ tablet by mouth Daily.  Dispense: 30 tablet; Refill: 3  -     metoprolol tartrate (LOPRESSOR) 50 MG tablet; If heart rate less than 60, hold medication. If heart rate 60-75 & systolic BP is greater than 120, take 1 tab one hour prior to appt, if heart rate greater than 75 & systolic BP greater than 120, take 2 tabs one hour prior to appt.  Dispense: 2 tablet; Refill: 0        Review of medical record including discussion of recent test results    Lifestyle modifications including heart healthy diet, regular exercise, maintenance of desirable body weight and avoidance of tobacco products    Follow-up in 6 weeks, sooner if needed.

## 2024-06-20 NOTE — TELEPHONE ENCOUNTER
Caller: Mara Sanchez    Relationship: Emergency Contact    Best call back number: [phone] OR PATIENT'S NUMBER    What was the call regarding: AT PATIENT'S APPOINTMENT ON THE 13TH HE DISCUSSED WITH JEANNINE HAVING A HEART CATH OR A 3D SCAN OF HIS HEART. PATIENT BELIEVED THAT THEY WERE GOING TO PROCEED WITH THE 3D SCAN. PATIENT HAS NOT BEEN CONTACTED TO SCHEDULE AND WHEN THEY CONTACTED SCHEDULING THEY WERE TOLD AN ORDER HAS NOT BEEN PLACED.

## 2024-06-25 RX ORDER — CITALOPRAM HYDROBROMIDE 10 MG/1
10 TABLET ORAL DAILY
OUTPATIENT
Start: 2024-06-25

## 2024-06-26 RX ORDER — CITALOPRAM HYDROBROMIDE 10 MG/1
10 TABLET ORAL DAILY
OUTPATIENT
Start: 2024-06-26

## 2024-06-26 NOTE — TELEPHONE ENCOUNTER
Spoke with wife she reports he had not been taking it until about a month ago was having Panic attacks & anxiety so he started taking it,now needs a refill?

## 2024-06-26 NOTE — TELEPHONE ENCOUNTER
Caller: Mara Sanchez    Relationship: Emergency Contact    Best call back number: [phone]     Requested Prescriptions:   Requested Prescriptions     Pending Prescriptions Disp Refills    citalopram (CeleXA) 10 MG tablet       Sig: Take 1 tablet by mouth Daily.        Pharmacy where request should be sent: [facility]      Last office visit with prescribing clinician: 3/17/2023   Last telemedicine visit with prescribing clinician: Visit date not found   Next office visit with prescribing clinician: Visit date not found     Additional details provided by patient: PATIENT HAS BEEN OUT OF THIS MEDICATION SINCE 06/23/24. PLEASE ASK THE PHARMACY TO MAIL THE MEDICATION TO THE PATIENT.    Does the patient have less than a 3 day supply:  [x] Yes  [] No    Would you like a call back once the refill request has been completed: [x] Yes [] No    If the office needs to give you a call back, can they leave a voicemail: [x] Yes [] No    Elsy Yo Rep   06/26/24 09:10 EDT

## 2024-07-02 RX ORDER — CITALOPRAM HYDROBROMIDE 10 MG/1
10 TABLET ORAL DAILY
Qty: 30 TABLET | Refills: 2 | Status: SHIPPED | OUTPATIENT
Start: 2024-07-02

## 2024-07-05 ENCOUNTER — SPECIALTY PHARMACY (OUTPATIENT)
Dept: PHARMACY | Facility: HOSPITAL | Age: 44
End: 2024-07-05
Payer: COMMERCIAL

## 2024-07-05 DIAGNOSIS — E11.65 TYPE 2 DIABETES MELLITUS WITH HYPERGLYCEMIA, WITHOUT LONG-TERM CURRENT USE OF INSULIN: Chronic | ICD-10-CM

## 2024-07-05 RX ORDER — METFORMIN HYDROCHLORIDE 500 MG/1
1000 TABLET, EXTENDED RELEASE ORAL 2 TIMES DAILY
Qty: 84 TABLET | Refills: 7 | Status: CANCELLED | OUTPATIENT
Start: 2024-07-05

## 2024-07-05 RX ORDER — DAPAGLIFLOZIN 10 MG/1
10 TABLET, FILM COATED ORAL DAILY
Qty: 21 TABLET | Refills: 7 | Status: CANCELLED | OUTPATIENT
Start: 2024-07-05

## 2024-07-05 NOTE — PROGRESS NOTES
Specialty Pharmacy Refill Coordination Note     David is a 43 y.o. male contacted today regarding refills of  Januvia, Metformin, Farxiga specialty medication(s).    Reviewed and verified with patient:       Specialty medication(s) and dose(s) confirmed: yes    Refill Questions      Flowsheet Row Most Recent Value   Changes to allergies? No   Changes to medications? No   New conditions or infections since last clinic visit No   Unplanned office visit, urgent care, ED, or hospital admission in the last 4 weeks  No   How does patient/caregiver feel medication is working? Very good   Financial problems or insurance changes  No   Since the previous refill, were any specialty medication doses or scheduled injections missed or delayed?  No   Why were doses missed? na   Does this patient require a clinical escalation to a pharmacist? No            Delivery Questions      Flowsheet Row Most Recent Value   Copay verified? No   Copay amount na   Copay form of payment No copayment ($0)                   Follow-up: 30 day(s)     Alda Arnett, Pharmacy Technician  Specialty Pharmacy Technician

## 2024-07-08 ENCOUNTER — SPECIALTY PHARMACY (OUTPATIENT)
Dept: PHARMACY | Facility: HOSPITAL | Age: 44
End: 2024-07-08
Payer: COMMERCIAL

## 2024-07-08 DIAGNOSIS — E11.65 TYPE 2 DIABETES MELLITUS WITH HYPERGLYCEMIA, WITHOUT LONG-TERM CURRENT USE OF INSULIN: Chronic | ICD-10-CM

## 2024-07-08 RX ORDER — METFORMIN HYDROCHLORIDE 500 MG/1
1000 TABLET, EXTENDED RELEASE ORAL 2 TIMES DAILY
Qty: 84 TABLET | Refills: 7 | Status: SHIPPED | OUTPATIENT
Start: 2024-07-08

## 2024-07-08 RX ORDER — DAPAGLIFLOZIN 10 MG/1
10 TABLET, FILM COATED ORAL DAILY
Qty: 21 TABLET | Refills: 7 | Status: SHIPPED | OUTPATIENT
Start: 2024-07-08

## 2024-07-08 NOTE — PROGRESS NOTES
Specialty Pharmacy Patient Management Program  Endocrinology Reassessment     David Sanchez is a 43 y.o. male with Type 2 Diabetes seen by an Endocrinology Provider and enrolled in the Endocrinology Patient Management program offered by [facility]. A follow-up was conducted, including assessment of continued therapy appropriateness, medication adherence, and side effect incidence and management for Metformin, Januvia and Farxiga.     Patient is taking metformin ER 2,000 mg daily, Januvia 100 mg daily and Farxiga 10 mg daily to control BG. He reports tolerating the medications well, denies changes in medications, and denies missing any doses of his medications. He doesn't routinely monitor his BG at home. He denies any signs/symptoms of hypoglycemia at this time.    Changes to Insurance Coverage or Financial Support  None     Relevant Past Medical History and Comorbidities  Relevant medical history and concomitant health conditions were discussed with the patient. The patient's chart has been reviewed for relevant past medical history and comorbid health conditions and updated as necessary.   Past Medical History:   Diagnosis Date    Colon polyp     Disorder associated with type 2 diabetes mellitus     Elevated cholesterol     Hypercholesterolemia     Hypertension     Type 2 diabetes mellitus 2017     Social History     Socioeconomic History    Marital status:    Tobacco Use    Smoking status: Former     Current packs/day: 0.00     Average packs/day: 0.3 packs/day for 7.0 years (1.8 ttl pk-yrs)     Types: Cigarettes, Electronic Cigarette     Start date: 2000     Quit date: 2008     Years since quittin.5    Smokeless tobacco: Never   Vaping Use    Vaping status: Never Used   Substance and Sexual Activity    Alcohol use: Yes     Alcohol/week: 10.0 standard drinks of alcohol     Types: 10 Cans of beer per week     Comment: occ    Drug use: Never    Sexual activity: Yes      Partners: Female     Birth control/protection: I.U.D.       Problem list reviewed by Breanna Ramirez PharmD on 7/8/2024 at 11:07 AM    Allergies  Known allergies and reactions were discussed with the patient. The patient's chart has been reviewed for allergy information and updated as necessary.   Patient has no known allergies.    Allergies reviewed by Breanna Ramirez PharmD on 7/8/2024 at 11:07 AM    Relevant Laboratory Values  A1C Last 3 Results          7/28/2023    09:53 2/2/2024    15:08   HGBA1C Last 3 Results   Hemoglobin A1C 6.2  7.9      Lab Results   Component Value Date    HGBA1C 7.9 (A) 02/02/2024     Lab Results   Component Value Date    GLUCOSE 215 (H) 06/14/2024    CALCIUM 9.4 06/14/2024     06/14/2024    K 4.2 06/14/2024    CO2 23.3 06/14/2024     06/14/2024    BUN 18 06/14/2024    CREATININE 1.03 06/14/2024    BCR 17.5 06/14/2024    ANIONGAP 13.7 06/14/2024     Lab Results   Component Value Date    CHOL 210 (H) 05/20/2024    CHLPL 219 (H) 10/07/2022    TRIG 318 (H) 05/20/2024    HDL 43 05/20/2024     (H) 05/20/2024       Current Medication List  This medication list has been reviewed with the patient and evaluated for any interactions or necessary modifications/recommendations, and updated to include all prescription medications, OTC medications, and supplements the patient is currently taking.  This list reflects what is contained in the patient's profile, which has also been marked as reviewed to communicate to other providers it is the most up to date version of the patient's current medication therapy.     Current Outpatient Medications:     dapagliflozin Propanediol (Farxiga) 10 MG tablet, Take 1 tablet by mouth Daily., Disp: 21 tablet, Rfl: 7    metFORMIN ER (GLUCOPHAGE-XR) 500 MG 24 hr tablet, Take 2 tablets by mouth 2 (Two) Times a Day., Disp: 84 tablet, Rfl: 7    SITagliptin (Januvia) 100 MG tablet, Take 1 tablet by mouth Daily., Disp: 90 tablet, Rfl: 1     Cholecalciferol 25 MCG (1000 UT) tablet, Take 1 tablet by mouth Daily., Disp: , Rfl:     citalopram (CeleXA) 10 MG tablet, Take 1 tablet by mouth Daily., Disp: 30 tablet, Rfl: 2    ibuprofen (ADVIL,MOTRIN) 800 MG tablet, Take 1 tablet by mouth Every 8 (Eight) Hours As Needed for pain., Disp: 20 tablet, Rfl: 1    Lancets (OneTouch Delica Plus [id number]) misc, Use to test blood sugar twice daily, Disp: 100 each, Rfl: 5    lisinopril (PRINIVIL,ZESTRIL) 5 MG tablet, Take 1 tablet by mouth Daily., Disp: 21 tablet, Rfl: 7    loratadine (CLARITIN) 10 MG tablet, Take 1 tablet by mouth Daily., Disp: , Rfl:     magnesium oxide (MAG-OX) 400 MG tablet, Take 1 tablet by mouth Daily., Disp: , Rfl:     metoprolol succinate XL (TOPROL-XL) 25 MG 24 hr tablet, Take ½ tablet by mouth Daily., Disp: 30 tablet, Rfl: 3    metoprolol tartrate (LOPRESSOR) 50 MG tablet, If heart rate less than 60, hold medication. If heart rate 60-75 & systolic BP is greater than 120, take 1 tab one hour prior to appt, if heart rate greater than 75 & systolic BP greater than 120, take 2 tabs one hour prior to appt., Disp: 2 tablet, Rfl: 0    omeprazole (priLOSEC) 40 MG capsule, Take 1 capsule by mouth Daily., Disp: 30 capsule, Rfl: 2    OneTouch Verio test strip, Use to test blood sugar once daily (Patient taking differently: Use to test blood sugar once daily), Disp: 100 each, Rfl: 3    rosuvastatin (Crestor) 5 MG tablet, Take 1 tablet by mouth Daily., Disp: 30 tablet, Rfl: 2    sildenafil (Viagra) 50 MG tablet, Take 1/2 tablet by mouth Daily As Needed for Erectile Dysfunction., Disp: 30 tablet, Rfl: 0    Thiamine Mononitrate (B1) 100 MG tablet, Take 100 mg by mouth Daily., Disp: , Rfl:     Medicines reviewed by Breanna Ramirez, PharmD on 7/8/2024 at 11:07 AM    Drug Interactions  Coadministration of metformin and IBU may increase the risk of acute renal failure and lactic acidosis - provider will continue to routinely monitor kidney function (he only uses  IBU PRN)  Lisinopril may enhance the adverse/toxic effect of metformin. This includes both a risk for hypoglycemia and for lactic acidosis.    Adverse Drug Reactions  Adverse Reactions Experienced: None  Plan for ADR Management: Not Required    Hospitalizations and Urgent Care Since Last Assessment  Hospitalizations or Admissions: None  ED Visits: None  Urgent Office Visits: None    Adherence and Self-Administration  Adherence related patient's specialty therapy was discussed with the patient. The Adherence segment of this outreach has been reviewed and updated.     Ongoing or New Barriers to Patient Adherence and/or Self-Administration: None   Methods for Supporting Patient Adherence and/or Self-Administration: None Required     Goals of Therapy  Goals related to the patient's specialty therapy was discussed with the patient. The Patient Goals segment of this outreach has been reviewed and updated.    Goals Addressed Today        Consistently take medications as prescribed      Specialty Pharmacy General Goal      Prevent hypoglcyemia            Reassessment Plan & Follow-Up  Recent Provider Medication Therapy Changes: Per AKIL Byrne will order refills for  Januvia 100 mg QD  Metformin 1,000 mg BID   Farxiga 10 mg QD  Additional Plans, Therapy Recommendations, or Therapy Problems to Be Addressed:   Will send updated RX's to [employer] for shipping services.   Pharmacist to perform regular reassessments no more than (6) months from the previous assessment.  Care Coordinator to set up future refill outreaches, coordinate prescription delivery, and escalate clinical questions to pharmacist.     Attestation  I attest the patient was actively involved in and has agreed to the above plan of care. If the prescribed therapy is at any point deemed not appropriate based on the current or future assessments, a consultation will be initiated with the patient's specialty care provider to determine the best course of  action. The revised plan of therapy will be documented along with any required assessments and/or additional patient education provided.     Breanna Ramirez, Danay, ÁNGELA, LISA  Clinical Specialty Pharmacist, Endocrinology  7/8/2024  11:15 EDT

## 2024-07-12 ENCOUNTER — OFFICE VISIT (OUTPATIENT)
Dept: FAMILY MEDICINE CLINIC | Facility: CLINIC | Age: 44
End: 2024-07-12
Payer: COMMERCIAL

## 2024-07-12 VITALS
TEMPERATURE: 97.8 F | WEIGHT: 201.6 LBS | OXYGEN SATURATION: 96 % | HEART RATE: 110 BPM | BODY MASS INDEX: 25.07 KG/M2 | HEIGHT: 75 IN | DIASTOLIC BLOOD PRESSURE: 98 MMHG | SYSTOLIC BLOOD PRESSURE: 120 MMHG

## 2024-07-12 DIAGNOSIS — I51.9 LV DYSFUNCTION: ICD-10-CM

## 2024-07-12 DIAGNOSIS — J30.2 SEASONAL ALLERGIES: ICD-10-CM

## 2024-07-12 DIAGNOSIS — R10.13 DYSPEPSIA: Primary | ICD-10-CM

## 2024-07-12 DIAGNOSIS — F41.1 GENERALIZED ANXIETY DISORDER: ICD-10-CM

## 2024-07-12 PROCEDURE — 99214 OFFICE O/P EST MOD 30 MIN: CPT | Performed by: STUDENT IN AN ORGANIZED HEALTH CARE EDUCATION/TRAINING PROGRAM

## 2024-07-12 RX ORDER — LORATADINE 10 MG/1
10 TABLET ORAL DAILY
Qty: 90 TABLET | Refills: 1 | Status: SHIPPED | OUTPATIENT
Start: 2024-07-12

## 2024-07-12 RX ORDER — HYDROXYZINE HYDROCHLORIDE 10 MG/1
10 TABLET, FILM COATED ORAL DAILY PRN
Qty: 60 TABLET | Refills: 2 | Status: SHIPPED | OUTPATIENT
Start: 2024-07-12

## 2024-07-12 RX ORDER — HYDROXYZINE HYDROCHLORIDE 10 MG/1
10 TABLET, FILM COATED ORAL DAILY PRN
COMMUNITY
End: 2024-07-12 | Stop reason: SDUPTHER

## 2024-07-12 NOTE — PROGRESS NOTES
Chief Complaint  Heartburn (Follow up/)    HPI:   Heartburn       David Sanchez is a 43 y.o. male who presents today to [facility] for Heartburn (Follow up/). Patient seen in follow up for intermittent chest pains. At last visit he was negative for H. Pylori, and he was started on omeprazole. He reports near to complete resolution of his chest pain. He has been seen by cardiology with negative findings for ischemia, however echo revealed an EF between 46-50%, and stress test EF was calculated at 34%. He is pending a CT angiogram of his heart. He denies chest pain, shortness of breath, orthopnea or PND.     Previous History:   Past Medical History:   Diagnosis Date    Colon polyp     Disorder associated with type 2 diabetes mellitus     Elevated cholesterol     Hypercholesterolemia     Hypertension     Type 2 diabetes mellitus       Past Surgical History:   Procedure Laterality Date    APPENDECTOMY      COLONOSCOPY N/A 2023    Procedure: COLONOSCOPY;  Surgeon: Satnam Garnett MD;  Location: [facility];  Service: Gastroenterology;  Laterality: N/A;    EYE SURGERY      LASIK        Social History     Socioeconomic History    Marital status:    Tobacco Use    Smoking status: Former     Current packs/day: 0.00     Average packs/day: 0.3 packs/day for 7.0 years (1.8 ttl pk-yrs)     Types: Cigarettes, Electronic Cigarette     Start date: 2000     Quit date: 2008     Years since quittin.5    Smokeless tobacco: Never   Vaping Use    Vaping status: Some Days   Substance and Sexual Activity    Alcohol use: Yes     Alcohol/week: 10.0 standard drinks of alcohol     Types: 10 Cans of beer per week     Comment: occ    Drug use: Never    Sexual activity: Yes     Partners: Female     Birth control/protection: I.U.D.      Health Maintenance Due   Topic Date Due    DIABETIC EYE EXAM  Never done    Hepatitis B (2 of 3 - 3-dose series) 1998    TDAP/TD VACCINES  (2 - Tdap) 08/04/2008    HEPATITIS C SCREENING  Never done    COVID-19 Vaccine (4 - 2023-24 season) 09/01/2023    URINE MICROALBUMIN  01/19/2024    ANNUAL PHYSICAL  02/24/2024    HEMOGLOBIN A1C  08/02/2024        Current Medications:  Current Outpatient Medications   Medication Sig Dispense Refill    Cholecalciferol 25 MCG (1000 UT) tablet Take 1 tablet by mouth Daily.      dapagliflozin Propanediol (Farxiga) 10 MG tablet Take 1 tablet by mouth Daily. 21 tablet 7    hydrOXYzine (ATARAX) 10 MG tablet Take 1 tablet by mouth Daily As Needed for Anxiety. 60 tablet 2    ibuprofen (ADVIL,MOTRIN) 800 MG tablet Take 1 tablet by mouth Every 8 (Eight) Hours As Needed for pain. 20 tablet 1    Lancets (OneTouch Delica Plus [id number]) misc Use to test blood sugar twice daily 100 each 5    lisinopril (PRINIVIL,ZESTRIL) 5 MG tablet Take 1 tablet by mouth Daily. 21 tablet 7    loratadine (CLARITIN) 10 MG tablet Take 1 tablet by mouth Daily. 90 tablet 1    magnesium oxide (MAG-OX) 400 MG tablet Take 1 tablet by mouth Daily.      metFORMIN ER (GLUCOPHAGE-XR) 500 MG 24 hr tablet Take 2 tablets by mouth 2 (Two) Times a Day. 84 tablet 7    metoprolol succinate XL (TOPROL-XL) 25 MG 24 hr tablet Take ½ tablet by mouth Daily. 30 tablet 3    metoprolol tartrate (LOPRESSOR) 50 MG tablet If heart rate less than 60, hold medication. If heart rate 60-75 & systolic BP is greater than 120, take 1 tab one hour prior to appt, if heart rate greater than 75 & systolic BP greater than 120, take 2 tabs one hour prior to appt. 2 tablet 0    omeprazole (priLOSEC) 40 MG capsule Take 1 capsule by mouth Daily. 30 capsule 2    OneTouch Verio test strip Use to test blood sugar once daily (Patient taking differently: Use to test blood sugar once daily) 100 each 3    rosuvastatin (Crestor) 5 MG tablet Take 1 tablet by mouth Daily. 30 tablet 2    sildenafil (Viagra) 50 MG tablet Take 1/2 tablet by mouth Daily As Needed for Erectile Dysfunction. 30 tablet 0     "SITagliptin (Januvia) 100 MG tablet Take 1 tablet by mouth Daily. 90 tablet 1    Thiamine Mononitrate (B1) 100 MG tablet Take 100 mg by mouth Daily.      citalopram (CeleXA) 10 MG tablet Take 1 tablet by mouth Daily. (Patient not taking: Reported on 7/12/2024) 30 tablet 2     No current facility-administered medications for this visit.       Allergies:   No Known Allergies    Vitals:   /98 (BP Location: Right arm, Patient Position: Sitting, Cuff Size: Adult)   Pulse 110   Temp 97.8 °F (36.6 °C) (Temporal)   Ht 190.5 cm (75\")   Wt 91.4 kg (201 lb 9.6 oz)   SpO2 96%   BMI 25.20 kg/m²     Estimated body mass index is 25.2 kg/m² as calculated from the following:    Height as of this encounter: 190.5 cm (75\").    Weight as of this encounter: 91.4 kg (201 lb 9.6 oz).    David Sanchez  reports that he quit smoking about 16 years ago. His smoking use included cigarettes and electronic cigarette. He started smoking about 23 years ago. He has a 1.8 pack-year smoking history. He has never used smokeless tobacco.            Physical Exam:   Physical Exam  Constitutional:       Appearance: Normal appearance.   HENT:      Mouth/Throat:      Mouth: Mucous membranes are moist.   Cardiovascular:      Rate and Rhythm: Normal rate and regular rhythm.   Pulmonary:      Effort: Pulmonary effort is normal.      Breath sounds: Normal breath sounds. No wheezing or rhonchi.   Musculoskeletal:         General: Normal range of motion.   Skin:     General: Skin is warm and dry.   Neurological:      Mental Status: He is alert.   Psychiatric:         Mood and Affect: Mood normal.          Lab Results:   Lab on 06/14/2024   Component Date Value Ref Range Status    Glucose 06/14/2024 215 (H)  65 - 99 mg/dL Final    BUN 06/14/2024 18  6 - 20 mg/dL Final    Creatinine 06/14/2024 1.03  0.76 - 1.27 mg/dL Final    Sodium 06/14/2024 140  136 - 145 mmol/L Final    Potassium 06/14/2024 4.2  3.5 - 5.2 mmol/L Final    Chloride 06/14/2024 103  " 98 - 107 mmol/L Final    CO2 06/14/2024 23.3  22.0 - 29.0 mmol/L Final    Calcium 06/14/2024 9.4  8.6 - 10.5 mg/dL Final    BUN/Creatinine Ratio 06/14/2024 17.5  7.0 - 25.0 Final    Anion Gap 06/14/2024 13.7  5.0 - 15.0 mmol/L Final    eGFR 06/14/2024 92.4  >60.0 mL/min/1.73 Final    proBNP 06/14/2024 58.5  0.0 - 450.0 pg/mL Final    Magnesium 06/14/2024 1.8  1.6 - 2.6 mg/dL Final   Hospital Outpatient Visit on 06/07/2024   Component Date Value Ref Range Status    Nuclear Prior Study 06/07/2024 3.0   Final     CV REST NUCLEAR ISOTOPE DOSE 06/07/2024 10.4  mCi Final     CV STRESS NUCLEAR ISOTOPE DOSE 06/07/2024 30.4  mCi Final    Exercise duration (min) 06/07/2024 9  min Final    Exercise duration (sec) 06/07/2024 31  sec Final    Estimated workload 06/07/2024 11.8  METS Final     CV STRESS PROTOCOL 1 06/07/2024 Se[place]   Final    Stage 1 06/07/2024 1.0   Final    HR Stage 1 06/07/2024 118   Final    BP Stage 1 06/07/2024 157/95   Final    Duration Min Stage 1 06/07/2024 3   Final    Duration Sec Stage 1 06/07/2024 0   Final    Grade Stage 1 06/07/2024 10   Final    Speed Stage 1 06/07/2024 1.7   Final     CV STRESS METS STAGE 1 06/07/2024 5.0   Final    Stress Dose Regadenoson Stage 1 06/07/2024 0.40   Final    Stress Comments Stage 1 06/07/2024 10 sec bolus injection   Final    Stage 2 06/07/2024 2.0   Final    HR Stage 2 06/07/2024 131   Final    BP Stage 2 06/07/2024 163/93   Final    Duration Min Stage 2 06/07/2024 3   Final    Duration Sec Stage 2 06/07/2024 0   Final    Grade Stage 2 06/07/2024 12   Final    Speed Stage 2 06/07/2024 2.5   Final     CV STRESS METS STAGE 2 06/07/2024 7.5   Final    Stage 3 06/07/2024 3.0   Final    HR Stage 3 06/07/2024 150   Final    BP Stage 3 06/07/2024 181/88   Final    Duration Min Stage 3 06/07/2024 3   Final    Duration Sec Stage 3 06/07/2024 0   Final    Grade Stage 3 06/07/2024 14   Final    Speed Stage 3 06/07/2024 3.4   Final    BH CV STRESS METS STAGE 3  06/07/2024 10.0   Final    Stage 4 06/07/2024 4.0   Final    HR Stage 4 06/07/2024 153   Final    Duration Sec Stage 4 06/07/2024 31   Final    Grade Stage 4 06/07/2024 16   Final    Speed Stage 4 06/07/2024 4.2   Final    BH CV STRESS METS STAGE 4 06/07/2024 13.5   Final    Baseline HR 06/07/2024 98  bpm Final    Baseline BP 06/07/2024 150/92  mmHg Final    Peak HR 06/07/2024 153  bpm Final    Peak BP 06/07/2024 181/88  mmHg Final    Recovery HR 06/07/2024 103  bpm Final    Recovery BP 06/07/2024 141/84  mmHg Final    Target HR (85%) 06/07/2024 150  bpm Final    Max. Pred. HR (100%) 06/07/2024 177  bpm Final    Percent Max Pred HR 06/07/2024 86.44  % Final    Percent Target HR 06/07/2024 102  % Final    Nuc Stress EF 06/07/2024 34  % Final   Hospital Outpatient Visit on 06/07/2024   Component Date Value Ref Range Status    LVIDd 06/07/2024 5.6  cm Final    LVIDs 06/07/2024 4.4  cm Final    IVSd 06/07/2024 0.90  cm Final    LVPWd 06/07/2024 0.90  cm Final    FS 06/07/2024 21.4  % Final    IVS/LVPW 06/07/2024 1.00  cm Final    ESV(cubed) 06/07/2024 85.2  ml Final    LV Sys Vol (BSA corrected) 06/07/2024 24.8  cm2 Final    EDV(cubed) 06/07/2024 175.6  ml Final    LV Mg Vol (BSA corrected) 06/07/2024 44.9  cm2 Final    LV mass(C)d 06/07/2024 191.6  grams Final    LVOT area 06/07/2024 4.2  cm2 Final    LVOT diam 06/07/2024 2.30  cm Final    EDV(MOD-sp4) 06/07/2024 100.0  ml Final    ESV(MOD-sp4) 06/07/2024 55.2  ml Final    SV(MOD-sp4) 06/07/2024 44.8  ml Final    SVi(MOD-SP4) 06/07/2024 20.1  ml/m2 Final    EF(MOD-sp4) 06/07/2024 44.8  % Final    MV E max ollie 06/07/2024 62.9  cm/sec Final    MV A max ollie 06/07/2024 72.2  cm/sec Final    MV E/A 06/07/2024 0.87   Final    LA ESV Index (BP) 06/07/2024 9.7  ml/m2 Final    Med Peak E' Ollie 06/07/2024 7.8  cm/sec Final    Lat Peak E' Ollie 06/07/2024 12.9  cm/sec Final    Avg E/e' ratio 06/07/2024 6.08   Final    LA dimension (2D)  06/07/2024 3.4  cm Final    Ao pk ollie  06/07/2024 133.0  cm/sec Final    Ao max PG 06/07/2024 7.1  mmHg Final    Ao mean PG 06/07/2024 5.0  mmHg Final    Ao V2 VTI 06/07/2024 23.5  cm Final    PA acc time 06/07/2024 0.12  sec Final    Ao root diam 06/07/2024 3.6  cm Final    ACS 06/07/2024 1.80  cm Final   Lab on 05/20/2024   Component Date Value Ref Range Status    H. pylori Breath Test 05/20/2024 Negative  Negative Final    WBC 05/20/2024 5.76  3.40 - 10.80 10*3/mm3 Final    RBC 05/20/2024 5.44  4.14 - 5.80 10*6/mm3 Final    Hemoglobin 05/20/2024 16.4  13.0 - 17.7 g/dL Final    Hematocrit 05/20/2024 46.1  37.5 - 51.0 % Final    MCV 05/20/2024 84.7  79.0 - 97.0 fL Final    MCH 05/20/2024 30.1  26.6 - 33.0 pg Final    MCHC 05/20/2024 35.6  31.5 - 35.7 g/dL Final    RDW 05/20/2024 12.7  12.3 - 15.4 % Final    RDW-SD 05/20/2024 38.7  37.0 - 54.0 fl Final    MPV 05/20/2024 12.2 (H)  6.0 - 12.0 fL Final    Platelets 05/20/2024 242  140 - 450 10*3/mm3 Final    Glucose 05/20/2024 172 (H)  65 - 99 mg/dL Final    BUN 05/20/2024 17  6 - 20 mg/dL Final    Creatinine 05/20/2024 1.01  0.76 - 1.27 mg/dL Final    Sodium 05/20/2024 139  136 - 145 mmol/L Final    Potassium 05/20/2024 4.1  3.5 - 5.2 mmol/L Final    Chloride 05/20/2024 101  98 - 107 mmol/L Final    CO2 05/20/2024 26.0  22.0 - 29.0 mmol/L Final    Calcium 05/20/2024 9.7  8.6 - 10.5 mg/dL Final    Total Protein 05/20/2024 7.2  6.0 - 8.5 g/dL Final    Albumin 05/20/2024 4.8  3.5 - 5.2 g/dL Final    ALT (SGPT) 05/20/2024 74 (H)  1 - 41 U/L Final    AST (SGOT) 05/20/2024 31  1 - 40 U/L Final    Alkaline Phosphatase 05/20/2024 72  39 - 117 U/L Final    Total Bilirubin 05/20/2024 1.1  0.0 - 1.2 mg/dL Final    Globulin 05/20/2024 2.4  gm/dL Final    A/G Ratio 05/20/2024 2.0  g/dL Final    BUN/Creatinine Ratio 05/20/2024 16.8  7.0 - 25.0 Final    Anion Gap 05/20/2024 12.0  5.0 - 15.0 mmol/L Final    eGFR 05/20/2024 94.6  >60.0 mL/min/1.73 Final    TSH 05/20/2024 1.760  0.270 - 4.200 uIU/mL Final    Total  Cholesterol 05/20/2024 210 (H)  0 - 200 mg/dL Final    Triglycerides 05/20/2024 318 (H)  0 - 150 mg/dL Final    HDL Cholesterol 05/20/2024 43  40 - 60 mg/dL Final    LDL Cholesterol  05/20/2024 112 (H)  0 - 100 mg/dL Final    VLDL Cholesterol 05/20/2024 55 (H)  5 - 40 mg/dL Final    LDL/HDL Ratio 05/20/2024 2.40   Final   Office Visit on 02/02/2024   Component Date Value Ref Range Status    Glucose 02/02/2024 124  70 - 130 mg/dL Final    Lot Number 02/02/2024 [id number]   Final    Expiration Date 02/02/2024 6/16/24   Final    Hemoglobin A1C 02/02/2024 7.9 (A)  4.5 - 5.7 % Final    Lot Number 02/02/2024 [id number]   Final    Expiration Date 02/02/2024 9/28/25   Final       Assessment and Plan  Diagnoses and all orders for this visit:    1. Dyspepsia (Primary)    2. LV dysfunction    3. Generalized anxiety disorder  -     hydrOXYzine (ATARAX) 10 MG tablet; Take 1 tablet by mouth Daily As Needed for Anxiety.  Dispense: 60 tablet; Refill: 2    4. Seasonal allergies  -     loratadine (CLARITIN) 10 MG tablet; Take 1 tablet by mouth Daily.  Dispense: 90 tablet; Refill: 1    Patients chest pain proves to likely be non-cardiac in origin. Resolution with omeprazole. Due to the severity of his GERD I will extend treatment for another 3 months, then consider titration to an H2 blocker.   EF is acutely reduced on echocardiogram. Patient has no symptoms of CHF. Unclear if this was an adequate study, or reduced EF due to tachycardia. I will defer management to cardiology.   Continue vistaril prn.   Contine loratadine.             New Medications:   New Medications Ordered This Visit   Medications    hydrOXYzine (ATARAX) 10 MG tablet     Sig: Take 1 tablet by mouth Daily As Needed for Anxiety.     Dispense:  60 tablet     Refill:  2    loratadine (CLARITIN) 10 MG tablet     Sig: Take 1 tablet by mouth Daily.     Dispense:  90 tablet     Refill:  1       Discontinued Medications:   Medications Discontinued During This Encounter    Medication Reason    loratadine (CLARITIN) 10 MG tablet Reorder    hydrOXYzine (ATARAX) 10 MG tablet Reorder                 Follow Up:   Return in about 3 months (around 10/12/2024).    Patient was given instructions and counseling regarding his condition or for health maintenance advice. Please see specific information pulled into the AVS if appropriate.       This document has been electronically signed by Ernie Bell DO   July 12, 2024 10:25 EDT    Dictated Utilizing Dragon Dictation: Part of this note may be an electronic transcription/translation of spoken language to printed text using the Dragon Dictation System.

## 2024-07-19 ENCOUNTER — HOSPITAL ENCOUNTER (OUTPATIENT)
Dept: CT IMAGING | Facility: HOSPITAL | Age: 44
Discharge: HOME | End: 2024-07-19
Admitting: NURSE PRACTITIONER
Payer: COMMERCIAL

## 2024-07-19 VITALS — HEART RATE: 71 BPM | DIASTOLIC BLOOD PRESSURE: 90 MMHG | SYSTOLIC BLOOD PRESSURE: 128 MMHG

## 2024-07-19 DIAGNOSIS — R07.9 CHEST PAIN, UNSPECIFIED TYPE: ICD-10-CM

## 2024-07-19 PROCEDURE — 25510000001 IOPAMIDOL PER 1 ML: Performed by: NURSE PRACTITIONER

## 2024-07-19 PROCEDURE — 75574 CT ANGIO HRT W/3D IMAGE: CPT

## 2024-07-19 RX ORDER — NITROGLYCERIN 0.4 MG/1
0.4 TABLET SUBLINGUAL
Status: DISCONTINUED | OUTPATIENT
Start: 2024-07-19 | End: 2024-07-20 | Stop reason: HOSPADM

## 2024-07-19 RX ADMIN — IOPAMIDOL 85 ML: 755 INJECTION, SOLUTION INTRAVENOUS at 17:25

## 2024-07-19 RX ADMIN — NITROGLYCERIN 0.4 MG: 0.4 TABLET, ORALLY DISINTEGRATING SUBLINGUAL at 17:25

## 2024-07-22 ENCOUNTER — SPECIALTY PHARMACY (OUTPATIENT)
Dept: PHARMACY | Facility: HOSPITAL | Age: 44
End: 2024-07-22
Payer: COMMERCIAL

## 2024-07-22 NOTE — PROGRESS NOTES
Specialty Pharmacy Refill Coordination Note     David is a 43 y.o. male contacted today regarding refills of  Metformin, Farxiga, Januvia specialty medication(s).    Reviewed and verified with patient:       Specialty medication(s) and dose(s) confirmed: yes    Refill Questions      Flowsheet Row Most Recent Value   Changes to allergies? No   Changes to medications? No   New conditions or infections since last clinic visit No   Unplanned office visit, urgent care, ED, or hospital admission in the last 4 weeks  No   How does patient/caregiver feel medication is working? Very good   Financial problems or insurance changes  No   Since the previous refill, were any specialty medication doses or scheduled injections missed or delayed?  No   Why were doses missed? na   Does this patient require a clinical escalation to a pharmacist? No            Delivery Questions      Flowsheet Row Most Recent Value   Copay verified? No   Copay amount na   Copay form of payment No copayment ($0)                   Follow-up: 21 day(s)     Alda Arnett, Pharmacy Technician  Specialty Pharmacy Technician

## 2024-07-26 ENCOUNTER — OFFICE VISIT (OUTPATIENT)
Dept: CARDIOLOGY | Facility: CLINIC | Age: 44
End: 2024-07-26
Payer: COMMERCIAL

## 2024-07-26 VITALS
BODY MASS INDEX: 25.51 KG/M2 | HEART RATE: 107 BPM | DIASTOLIC BLOOD PRESSURE: 84 MMHG | HEIGHT: 75 IN | WEIGHT: 205.2 LBS | SYSTOLIC BLOOD PRESSURE: 118 MMHG | OXYGEN SATURATION: 97 %

## 2024-07-26 DIAGNOSIS — Z91.89 MULTIPLE RISK FACTORS FOR CORONARY ARTERY DISEASE: ICD-10-CM

## 2024-07-26 DIAGNOSIS — I51.9 LV DYSFUNCTION: ICD-10-CM

## 2024-07-26 DIAGNOSIS — E11.65 TYPE 2 DIABETES MELLITUS WITH HYPERGLYCEMIA, WITHOUT LONG-TERM CURRENT USE OF INSULIN: ICD-10-CM

## 2024-07-26 DIAGNOSIS — I47.10 PSVT (PAROXYSMAL SUPRAVENTRICULAR TACHYCARDIA): Primary | ICD-10-CM

## 2024-07-26 DIAGNOSIS — E78.00 HYPERCHOLESTEROLEMIA: ICD-10-CM

## 2024-07-26 DIAGNOSIS — I10 ESSENTIAL HYPERTENSION: ICD-10-CM

## 2024-07-26 PROCEDURE — 99214 OFFICE O/P EST MOD 30 MIN: CPT | Performed by: NURSE PRACTITIONER

## 2024-07-26 RX ORDER — METOPROLOL SUCCINATE 25 MG/1
50 TABLET, EXTENDED RELEASE ORAL DAILY
Qty: 180 TABLET | Refills: 1 | Status: SHIPPED | OUTPATIENT
Start: 2024-07-26

## 2024-07-26 NOTE — PROGRESS NOTES
Subjective     David Sanchez is a 43 y.o. male.   Chief Complaint   Patient presents with    LV DYSFUNCTION   History of Present Illness   David Sanchez is a 43-year-old male who presents to clinic today for cardiology follow-up.    Longstanding tachycardia, completed a holter monitor with atrial tachycardia and PAC/PVC noted. Recently started on Metoprolol for heart rate control.  He feels he is doing well on medication and denies side effects.  He states heart rate has minimally improved and continues to run around 100. He denies worsening palpitations, dizziness, lightheaded to be. He denies any previous diagnosis of arrhythmia.  Recent labs with a normal TSH and CBC.  He does consume some caffeine.     DM currently managed by primary on oral medications and most recent hemoglobin A1c was 7.9.      Hyperlipidemia recently restarted Crestor.  He reports previously being on statin therapy but discontinued due to myalgias.  He has had both an intolerance to Lipitor and Crestor.  He reports doing well overall with Crestor since restarting. Have discussed alternative options to treatment with PCSK9 inhibitors.      Hypertension currently on lisinopril.  He denies home monitoring.  He denies medication side effects and reports compliance with medicine.    Due to intermittent chest discomfort he recently completed a stress test with no ischemia however a decreased LVEF was noted on stress test.  On echocardiogram his LVEF was noted at 46 to 50%.  Discussed further workup of CT imaging versus cardiac cath and he elected for CT imaging which he would like to discuss today.  He denies worsening chest discomfort or dyspnea.  He denies orthopnea or abdominal/lower extremity swelling.  He recently had a normal BNP.  He remains on lisinopril, Farxiga and beta-blocker therapy.    Patient Active Problem List   Diagnosis    Type 2 diabetes mellitus    Hypercholesterolemia    Essential hypertension    Screening for colon cancer      Past Medical History:   Diagnosis Date    Colon polyp     Disorder associated with type 2 diabetes mellitus     Elevated cholesterol     Hypercholesterolemia     Hypertension     Type 2 diabetes mellitus      Past Surgical History:   Procedure Laterality Date    APPENDECTOMY      COLONOSCOPY N/A 2023    Procedure: COLONOSCOPY;  Surgeon: Satnam Garnett MD;  Location: [facility];  Service: Gastroenterology;  Laterality: N/A;    EYE SURGERY      LASIK         Family History   Problem Relation Age of Onset    Diabetes Father     Diabetes Maternal Grandmother      Social History     Tobacco Use    Smoking status: Former     Current packs/day: 0.00     Average packs/day: 0.3 packs/day for 7.0 years (1.8 ttl pk-yrs)     Types: Cigarettes, Electronic Cigarette     Start date: 2000     Quit date: 2008     Years since quittin.6     Passive exposure: Never    Smokeless tobacco: Never   Vaping Use    Vaping status: Some Days   Substance Use Topics    Alcohol use: Yes     Alcohol/week: 10.0 standard drinks of alcohol     Types: 10 Cans of beer per week     Comment: occ    Drug use: Never         The following portions of the patient's history were reviewed and updated as appropriate: allergies, current medications, past family history, past medical history, past social history, past surgical history and problem list.    No Known Allergies      Current Outpatient Medications:     Cholecalciferol 25 MCG (1000 UT) tablet, Take 1 tablet by mouth Daily., Disp: , Rfl:     dapagliflozin Propanediol (Farxiga) 10 MG tablet, Take 1 tablet by mouth Daily., Disp: 21 tablet, Rfl: 7    hydrOXYzine (ATARAX) 10 MG tablet, Take 1 tablet by mouth Daily As Needed for Anxiety., Disp: 60 tablet, Rfl: 2    ibuprofen (ADVIL,MOTRIN) 800 MG tablet, Take 1 tablet by mouth Every 8 (Eight) Hours As Needed for pain., Disp: 20 tablet, Rfl: 1    Lancets (OneTouch Delica Plus [id number]) misc, Use to test blood sugar twice daily,  Disp: 100 each, Rfl: 5    lisinopril (PRINIVIL,ZESTRIL) 5 MG tablet, Take 1 tablet by mouth Daily., Disp: 21 tablet, Rfl: 7    loratadine (CLARITIN) 10 MG tablet, Take 1 tablet by mouth Daily., Disp: 90 tablet, Rfl: 1    magnesium oxide (MAG-OX) 400 MG tablet, Take 1 tablet by mouth Daily., Disp: , Rfl:     metFORMIN ER (GLUCOPHAGE-XR) 500 MG 24 hr tablet, Take 2 tablets by mouth 2 (Two) Times a Day., Disp: 84 tablet, Rfl: 7    metoprolol succinate XL (TOPROL-XL) 25 MG 24 hr tablet, Take 2 tablets by mouth Daily., Disp: 180 tablet, Rfl: 1    omeprazole (priLOSEC) 40 MG capsule, Take 1 capsule by mouth Daily., Disp: 30 capsule, Rfl: 2    OneTouch Verio test strip, Use to test blood sugar once daily (Patient taking differently: Use to test blood sugar once daily), Disp: 100 each, Rfl: 3    rosuvastatin (Crestor) 5 MG tablet, Take 1 tablet by mouth Daily., Disp: 30 tablet, Rfl: 2    sildenafil (Viagra) 50 MG tablet, Take 1/2 tablet by mouth Daily As Needed for Erectile Dysfunction., Disp: 30 tablet, Rfl: 0    SITagliptin (Januvia) 100 MG tablet, Take 1 tablet by mouth Daily., Disp: 90 tablet, Rfl: 1    Thiamine Mononitrate (B1) 100 MG tablet, Take 100 mg by mouth Daily., Disp: , Rfl:     citalopram (CeleXA) 10 MG tablet, Take 1 tablet by mouth Daily. (Patient not taking: Reported on 7/12/2024), Disp: 30 tablet, Rfl: 2    Review of Systems   Constitutional:  Negative for activity change, appetite change, chills, diaphoresis, fatigue and fever.   HENT:  Negative for congestion, drooling, ear discharge, ear pain, mouth sores, nosebleeds, postnasal drip, rhinorrhea, sinus pressure, sneezing and sore throat.    Eyes:  Negative for pain, discharge and visual disturbance.   Respiratory:  Negative for cough, chest tightness, shortness of breath and wheezing.    Cardiovascular:  Negative for chest pain, palpitations and leg swelling.   Gastrointestinal:  Negative for abdominal pain, constipation, diarrhea, nausea and vomiting.  "  Endocrine: Negative for cold intolerance, heat intolerance, polydipsia, polyphagia and polyuria.   Musculoskeletal:  Negative for arthralgias, myalgias and neck pain.   Skin:  Negative for rash and wound.   Neurological:  Negative for dizziness, syncope, speech difficulty, weakness, light-headedness and headaches.   Hematological:  Negative for adenopathy. Does not bruise/bleed easily.   Psychiatric/Behavioral:  Negative for confusion, dysphoric mood and sleep disturbance. The patient is not nervous/anxious.    All other systems reviewed and are negative.    /84 (BP Location: Left arm, Patient Position: Sitting, Cuff Size: Adult)   Pulse 107   Ht 190.5 cm (75\")   Wt 93.1 kg (205 lb 3.2 oz)   SpO2 97%   BMI 25.65 kg/m²     Objective   No Known Allergies    Physical Exam  Vitals reviewed.   Constitutional:       Appearance: Normal appearance. He is well-developed.   HENT:      Head: Normocephalic.   Eyes:      Conjunctiva/sclera: Conjunctivae normal.   Neck:      Thyroid: No thyromegaly.      Vascular: No carotid bruit or JVD.   Cardiovascular:      Rate and Rhythm: Regular rhythm. Tachycardia present.   Pulmonary:      Effort: Pulmonary effort is normal.      Breath sounds: Normal breath sounds.   Musculoskeletal:      Right lower leg: No edema.      Left lower leg: No edema.   Skin:     General: Skin is cool.   Neurological:      Mental Status: He is alert.   Psychiatric:         Attention and Perception: Attention normal.         Mood and Affect: Mood normal.         Speech: Speech normal.         Behavior: Behavior normal. Behavior is cooperative.         Cognition and Memory: Cognition normal.         LABS  WBC   Date Value Ref Range Status   05/20/2024 5.76 3.40 - 10.80 10*3/mm3 Final     RBC   Date Value Ref Range Status   05/20/2024 5.44 4.14 - 5.80 10*6/mm3 Final     Hemoglobin   Date Value Ref Range Status   05/20/2024 16.4 13.0 - 17.7 g/dL Final     Hematocrit   Date Value Ref Range Status "   05/20/2024 46.1 37.5 - 51.0 % Final     MCV   Date Value Ref Range Status   05/20/2024 84.7 79.0 - 97.0 fL Final     MCH   Date Value Ref Range Status   05/20/2024 30.1 26.6 - 33.0 pg Final     MCHC   Date Value Ref Range Status   05/20/2024 35.6 31.5 - 35.7 g/dL Final     RDW   Date Value Ref Range Status   05/20/2024 12.7 12.3 - 15.4 % Final     RDW-SD   Date Value Ref Range Status   05/20/2024 38.7 37.0 - 54.0 fl Final     MPV   Date Value Ref Range Status   05/20/2024 12.2 (H) 6.0 - 12.0 fL Final     Platelets   Date Value Ref Range Status   05/20/2024 242 140 - 450 10*3/mm3 Final       Total Cholesterol   Date Value Ref Range Status   05/20/2024 210 (H) 0 - 200 mg/dL Final     Triglycerides   Date Value Ref Range Status   05/20/2024 318 (H) 0 - 150 mg/dL Final     HDL Cholesterol   Date Value Ref Range Status   05/20/2024 43 40 - 60 mg/dL Final     LDL Cholesterol    Date Value Ref Range Status   05/20/2024 112 (H) 0 - 100 mg/dL Final     LDL Chol Calc (NIH)   Date Value Ref Range Status   10/07/2022 146 (H) 0 - 99 mg/dL Final     IMAGING  CT Angiogram Heart With 3D Image    Result Date: 7/21/2024  No hemodynamically significant stenosis or occlusion.  This report was finalized on 7/21/2024 11:01 AM by Alyssia Ynag M.D..              Assessment & Plan   Diagnoses and all orders for this visit:    1. PSVT (paroxysmal supraventricular tachycardia) (Primary)  Heart rate remains slightly elevated, will continue to increase metoprolol as patient tolerates  Recommend avoidance of caffeine/stimulants    2. LV dysfunction  Discussed and reviewed coronary CT, will continue to monitor symptoms with further workup as warranted.  Have discussed cardiac cath with patient but he elects medical management currently with deferment of further testing.  Continue Farxiga, lisinopril and metoprolol.  Advance guideline directed medical therapy as tolerated.  Consider repeating echocardiogram in several months.    3.  Hypercholesterolemia  Continue on statin, LDL goal less than 70, heart healthy diet    4. Essential hypertension  Well-controlled, continue current therapy    5. Multiple risk factors for coronary artery disease  Continue to monitor with further workup as warranted  Aggressive risk factor modification    6. Type 2 diabetes mellitus with hyperglycemia, without long-term current use of insulin  Recommend tight glycemic control for overall health benefit, management by primary    Other orders  -     metoprolol succinate XL (TOPROL-XL) 25 MG 24 hr tablet; Take 2 tablets by mouth Daily.  Dispense: 180 tablet; Refill: 1      Review of medical record including recent testing    Lifestyle modifications including heart healthy diet, regular exercise, maintenance of desirable body weight and avoidance of tobacco products    Follow-up in 3 months, sooner if needed.

## 2024-07-26 NOTE — LETTER
August 5, 2024     Mitch Reed,   [address] [place] [id number]    Patient: David Sanchez   YOB: 1980   Date of Visit: 7/26/2024       Dear Mitch Reed,     David Sanchez was in my office today. Below is a copy of my note.    If you have questions, please do not hesitate to call me. I look forward to following David along with you.         Sincerely,        AKIL Leigh        CC: No Recipients    Subjective    David Sanchez is a 43 y.o. male.   Chief Complaint   Patient presents with   • LV DYSFUNCTION   History of Present Illness   David Sanchez is a 43-year-old male who presents to clinic today for cardiology follow-up.    Longstanding tachycardia, completed a holter monitor with atrial tachycardia and PAC/PVC noted. Recently started on Metoprolol for heart rate control.  He feels he is doing well on medication and denies side effects.  He states heart rate has minimally improved and continues to run around 100. He denies worsening palpitations, dizziness, lightheaded to be. He denies any previous diagnosis of arrhythmia.  Recent labs with a normal TSH and CBC.  He does consume some caffeine.     DM currently managed by primary on oral medications and most recent hemoglobin A1c was 7.9.      Hyperlipidemia recently restarted Crestor.  He reports previously being on statin therapy but discontinued due to myalgias.  He has had both an intolerance to Lipitor and Crestor.  He reports doing well overall with Crestor since restarting. Have discussed alternative options to treatment with PCSK9 inhibitors.      Hypertension currently on lisinopril.  He denies home monitoring.  He denies medication side effects and reports compliance with medicine.    Due to intermittent chest discomfort he recently completed a stress test with no ischemia however a decreased LVEF was noted on stress test.  On echocardiogram his LVEF was noted at 46 to 50%.  Discussed further workup of CT  imaging versus cardiac cath and he elected for CT imaging which he would like to discuss today.  He denies worsening chest discomfort or dyspnea.  He denies orthopnea or abdominal/lower extremity swelling.  He recently had a normal BNP.  He remains on lisinopril, Farxiga and beta-blocker therapy.    Patient Active Problem List   Diagnosis   • Type 2 diabetes mellitus   • Hypercholesterolemia   • Essential hypertension   • Screening for colon cancer     Past Medical History:   Diagnosis Date   • Colon polyp    • Disorder associated with type 2 diabetes mellitus    • Elevated cholesterol    • Hypercholesterolemia    • Hypertension    • Type 2 diabetes mellitus      Past Surgical History:   Procedure Laterality Date   • APPENDECTOMY     • COLONOSCOPY N/A 2023    Procedure: COLONOSCOPY;  Surgeon: Satnam Garnett MD;  Location: [facility];  Service: Gastroenterology;  Laterality: N/A;   • EYE SURGERY     • LASIK         Family History   Problem Relation Age of Onset   • Diabetes Father    • Diabetes Maternal Grandmother      Social History     Tobacco Use   • Smoking status: Former     Current packs/day: 0.00     Average packs/day: 0.3 packs/day for 7.0 years (1.8 ttl pk-yrs)     Types: Cigarettes, Electronic Cigarette     Start date: 2000     Quit date: 2008     Years since quittin.6     Passive exposure: Never   • Smokeless tobacco: Never   Vaping Use   • Vaping status: Some Days   Substance Use Topics   • Alcohol use: Yes     Alcohol/week: 10.0 standard drinks of alcohol     Types: 10 Cans of beer per week     Comment: occ   • Drug use: Never         The following portions of the patient's history were reviewed and updated as appropriate: allergies, current medications, past family history, past medical history, past social history, past surgical history and problem list.    No Known Allergies      Current Outpatient Medications:   •  Cholecalciferol 25 MCG (1000 UT) tablet, Take 1 tablet by  mouth Daily., Disp: , Rfl:   •  dapagliflozin Propanediol (Farxiga) 10 MG tablet, Take 1 tablet by mouth Daily., Disp: 21 tablet, Rfl: 7  •  hydrOXYzine (ATARAX) 10 MG tablet, Take 1 tablet by mouth Daily As Needed for Anxiety., Disp: 60 tablet, Rfl: 2  •  ibuprofen (ADVIL,MOTRIN) 800 MG tablet, Take 1 tablet by mouth Every 8 (Eight) Hours As Needed for pain., Disp: 20 tablet, Rfl: 1  •  Lancets (OneTouch Delica Plus [id number]) misc, Use to test blood sugar twice daily, Disp: 100 each, Rfl: 5  •  lisinopril (PRINIVIL,ZESTRIL) 5 MG tablet, Take 1 tablet by mouth Daily., Disp: 21 tablet, Rfl: 7  •  loratadine (CLARITIN) 10 MG tablet, Take 1 tablet by mouth Daily., Disp: 90 tablet, Rfl: 1  •  magnesium oxide (MAG-OX) 400 MG tablet, Take 1 tablet by mouth Daily., Disp: , Rfl:   •  metFORMIN ER (GLUCOPHAGE-XR) 500 MG 24 hr tablet, Take 2 tablets by mouth 2 (Two) Times a Day., Disp: 84 tablet, Rfl: 7  •  metoprolol succinate XL (TOPROL-XL) 25 MG 24 hr tablet, Take 2 tablets by mouth Daily., Disp: 180 tablet, Rfl: 1  •  omeprazole (priLOSEC) 40 MG capsule, Take 1 capsule by mouth Daily., Disp: 30 capsule, Rfl: 2  •  OneTouch Verio test strip, Use to test blood sugar once daily (Patient taking differently: Use to test blood sugar once daily), Disp: 100 each, Rfl: 3  •  rosuvastatin (Crestor) 5 MG tablet, Take 1 tablet by mouth Daily., Disp: 30 tablet, Rfl: 2  •  sildenafil (Viagra) 50 MG tablet, Take 1/2 tablet by mouth Daily As Needed for Erectile Dysfunction., Disp: 30 tablet, Rfl: 0  •  SITagliptin (Januvia) 100 MG tablet, Take 1 tablet by mouth Daily., Disp: 90 tablet, Rfl: 1  •  Thiamine Mononitrate (B1) 100 MG tablet, Take 100 mg by mouth Daily., Disp: , Rfl:   •  citalopram (CeleXA) 10 MG tablet, Take 1 tablet by mouth Daily. (Patient not taking: Reported on 7/12/2024), Disp: 30 tablet, Rfl: 2    Review of Systems   Constitutional:  Negative for activity change, appetite change, chills, diaphoresis, fatigue and  "fever.   HENT:  Negative for congestion, drooling, ear discharge, ear pain, mouth sores, nosebleeds, postnasal drip, rhinorrhea, sinus pressure, sneezing and sore throat.    Eyes:  Negative for pain, discharge and visual disturbance.   Respiratory:  Negative for cough, chest tightness, shortness of breath and wheezing.    Cardiovascular:  Negative for chest pain, palpitations and leg swelling.   Gastrointestinal:  Negative for abdominal pain, constipation, diarrhea, nausea and vomiting.   Endocrine: Negative for cold intolerance, heat intolerance, polydipsia, polyphagia and polyuria.   Musculoskeletal:  Negative for arthralgias, myalgias and neck pain.   Skin:  Negative for rash and wound.   Neurological:  Negative for dizziness, syncope, speech difficulty, weakness, light-headedness and headaches.   Hematological:  Negative for adenopathy. Does not bruise/bleed easily.   Psychiatric/Behavioral:  Negative for confusion, dysphoric mood and sleep disturbance. The patient is not nervous/anxious.    All other systems reviewed and are negative.    /84 (BP Location: Left arm, Patient Position: Sitting, Cuff Size: Adult)   Pulse 107   Ht 190.5 cm (75\")   Wt 93.1 kg (205 lb 3.2 oz)   SpO2 97%   BMI 25.65 kg/m²     Objective  No Known Allergies    Physical Exam  Vitals reviewed.   Constitutional:       Appearance: Normal appearance. He is well-developed.   HENT:      Head: Normocephalic.   Eyes:      Conjunctiva/sclera: Conjunctivae normal.   Neck:      Thyroid: No thyromegaly.      Vascular: No carotid bruit or JVD.   Cardiovascular:      Rate and Rhythm: Regular rhythm. Tachycardia present.   Pulmonary:      Effort: Pulmonary effort is normal.      Breath sounds: Normal breath sounds.   Musculoskeletal:      Right lower leg: No edema.      Left lower leg: No edema.   Skin:     General: Skin is cool.   Neurological:      Mental Status: He is alert.   Psychiatric:         Attention and Perception: Attention " normal.         Mood and Affect: Mood normal.         Speech: Speech normal.         Behavior: Behavior normal. Behavior is cooperative.         Cognition and Memory: Cognition normal.         LABS  WBC   Date Value Ref Range Status   05/20/2024 5.76 3.40 - 10.80 10*3/mm3 Final     RBC   Date Value Ref Range Status   05/20/2024 5.44 4.14 - 5.80 10*6/mm3 Final     Hemoglobin   Date Value Ref Range Status   05/20/2024 16.4 13.0 - 17.7 g/dL Final     Hematocrit   Date Value Ref Range Status   05/20/2024 46.1 37.5 - 51.0 % Final     MCV   Date Value Ref Range Status   05/20/2024 84.7 79.0 - 97.0 fL Final     MCH   Date Value Ref Range Status   05/20/2024 30.1 26.6 - 33.0 pg Final     MCHC   Date Value Ref Range Status   05/20/2024 35.6 31.5 - 35.7 g/dL Final     RDW   Date Value Ref Range Status   05/20/2024 12.7 12.3 - 15.4 % Final     RDW-SD   Date Value Ref Range Status   05/20/2024 38.7 37.0 - 54.0 fl Final     MPV   Date Value Ref Range Status   05/20/2024 12.2 (H) 6.0 - 12.0 fL Final     Platelets   Date Value Ref Range Status   05/20/2024 242 140 - 450 10*3/mm3 Final       Total Cholesterol   Date Value Ref Range Status   05/20/2024 210 (H) 0 - 200 mg/dL Final     Triglycerides   Date Value Ref Range Status   05/20/2024 318 (H) 0 - 150 mg/dL Final     HDL Cholesterol   Date Value Ref Range Status   05/20/2024 43 40 - 60 mg/dL Final     LDL Cholesterol    Date Value Ref Range Status   05/20/2024 112 (H) 0 - 100 mg/dL Final     LDL Chol Calc (NIH)   Date Value Ref Range Status   10/07/2022 146 (H) 0 - 99 mg/dL Final     IMAGING  CT Angiogram Heart With 3D Image    Result Date: 7/21/2024  No hemodynamically significant stenosis or occlusion.  This report was finalized on 7/21/2024 11:01 AM by Alyssia Yang M.D..              Assessment & Plan  Diagnoses and all orders for this visit:    1. PSVT (paroxysmal supraventricular tachycardia) (Primary)  Heart rate remains slightly elevated, will continue to increase  metoprolol as patient tolerates  Recommend avoidance of caffeine/stimulants    2. LV dysfunction  Discussed and reviewed coronary CT, will continue to monitor symptoms with further workup as warranted.  Have discussed cardiac cath with patient but he elects medical management currently with deferment of further testing.  Continue Farxiga, lisinopril and metoprolol.  Advance guideline directed medical therapy as tolerated.  Consider repeating echocardiogram in several months.    3. Hypercholesterolemia  Continue on statin, LDL goal less than 70, heart healthy diet    4. Essential hypertension  Well-controlled, continue current therapy    5. Multiple risk factors for coronary artery disease  Continue to monitor with further workup as warranted  Aggressive risk factor modification    6. Type 2 diabetes mellitus with hyperglycemia, without long-term current use of insulin  Recommend tight glycemic control for overall health benefit, management by primary    Other orders  -     metoprolol succinate XL (TOPROL-XL) 25 MG 24 hr tablet; Take 2 tablets by mouth Daily.  Dispense: 180 tablet; Refill: 1      Review of medical record including recent testing    Lifestyle modifications including heart healthy diet, regular exercise, maintenance of desirable body weight and avoidance of tobacco products    Follow-up in 3 months, sooner if needed.

## 2024-07-30 ENCOUNTER — SPECIALTY PHARMACY (OUTPATIENT)
Dept: PHARMACY | Facility: HOSPITAL | Age: 44
End: 2024-07-30
Payer: COMMERCIAL

## 2024-07-30 NOTE — PROGRESS NOTES
Specialty Pharmacy Refill Coordination Note     David is a 43 y.o. male contacted today regarding refills of  Metformin specialty medication(s).    Reviewed and verified with patient:       Specialty medication(s) and dose(s) confirmed: yes    Refill Questions      Flowsheet Row Most Recent Value   Changes to allergies? No   Changes to medications? No   New conditions or infections since last clinic visit No   Unplanned office visit, urgent care, ED, or hospital admission in the last 4 weeks  No   How does patient/caregiver feel medication is working? Very good   Financial problems or insurance changes  No   Since the previous refill, were any specialty medication doses or scheduled injections missed or delayed?  No   Why were doses missed? na   Does this patient require a clinical escalation to a pharmacist? No            Delivery Questions      Flowsheet Row Most Recent Value   Copay verified? No   Copay amount na   Copay form of payment No copayment ($0)                   Follow-up: 21 day(s)     Alda Arnett Pharmacy Technician  Specialty Pharmacy Technician

## 2024-08-08 DIAGNOSIS — E78.2 MIXED HYPERLIPIDEMIA: ICD-10-CM

## 2024-08-08 DIAGNOSIS — R10.13 DYSPEPSIA: ICD-10-CM

## 2024-08-08 RX ORDER — ROSUVASTATIN CALCIUM 5 MG/1
5 TABLET, COATED ORAL DAILY
Qty: 30 TABLET | Refills: 2 | Status: SHIPPED | OUTPATIENT
Start: 2024-08-08

## 2024-08-08 RX ORDER — OMEPRAZOLE 40 MG/1
40 CAPSULE, DELAYED RELEASE ORAL DAILY
Qty: 30 CAPSULE | Refills: 2 | Status: SHIPPED | OUTPATIENT
Start: 2024-08-08

## 2024-08-09 ENCOUNTER — SPECIALTY PHARMACY (OUTPATIENT)
Dept: PHARMACY | Facility: HOSPITAL | Age: 44
End: 2024-08-09
Payer: COMMERCIAL

## 2024-08-09 NOTE — PROGRESS NOTES
Specialty Pharmacy Refill Coordination Note     David is a 43 y.o. male contacted today regarding refills of  Januvia and Farxiga specialty medication(s).    Reviewed and verified with patient:       Specialty medication(s) and dose(s) confirmed: yes    Refill Questions      Flowsheet Row Most Recent Value   Changes to allergies? No   Changes to medications? No   New conditions or infections since last clinic visit No   Unplanned office visit, urgent care, ED, or hospital admission in the last 4 weeks  No   How does patient/caregiver feel medication is working? Very good   Financial problems or insurance changes  No   Since the previous refill, were any specialty medication doses or scheduled injections missed or delayed?  No   Why were doses missed? na   Does this patient require a clinical escalation to a pharmacist? No            Delivery Questions      Flowsheet Row Most Recent Value   Delivery method [employer]   Delivery address verified with patient/caregiver? Yes   Delivery address Prescription   Number of medications in delivery 7   Medication(s) being filled and delivered Dapagliflozin Propanediol, Sitagliptin Phosphate   Doses left of specialty medications na   Copay verified? Yes   Copay amount 40.88   Copay form of payment Credit/debit on file   Ship Date 08/12   Delivery Date 08/13   Signature Required No                   Follow-up: 30 day(s)     Alda Arnett Pharmacy Technician  Specialty Pharmacy Technician

## 2024-08-16 ENCOUNTER — SPECIALTY PHARMACY (OUTPATIENT)
Dept: PHARMACY | Facility: HOSPITAL | Age: 44
End: 2024-08-16
Payer: COMMERCIAL

## 2024-08-16 ENCOUNTER — OFFICE VISIT (OUTPATIENT)
Dept: ENDOCRINOLOGY | Facility: CLINIC | Age: 44
End: 2024-08-16
Payer: COMMERCIAL

## 2024-08-16 VITALS
BODY MASS INDEX: 26.4 KG/M2 | SYSTOLIC BLOOD PRESSURE: 130 MMHG | HEART RATE: 88 BPM | OXYGEN SATURATION: 98 % | WEIGHT: 211.2 LBS | DIASTOLIC BLOOD PRESSURE: 88 MMHG

## 2024-08-16 DIAGNOSIS — E11.65 TYPE 2 DIABETES MELLITUS WITH HYPERGLYCEMIA, WITHOUT LONG-TERM CURRENT USE OF INSULIN: Chronic | ICD-10-CM

## 2024-08-16 DIAGNOSIS — E11.65 TYPE 2 DIABETES MELLITUS WITH HYPERGLYCEMIA, WITHOUT LONG-TERM CURRENT USE OF INSULIN: Primary | ICD-10-CM

## 2024-08-16 LAB
ALBUMIN UR-MCNC: 1.4 MG/DL
CREAT UR-MCNC: 141.9 MG/DL
EXPIRATION DATE: ABNORMAL
GLUCOSE BLDC GLUCOMTR-MCNC: 150 MG/DL (ref 70–130)
HBA1C MFR BLD: 6.4 % (ref 4.5–5.7)
Lab: ABNORMAL
MICROALBUMIN/CREAT UR: 9.9 MG/G (ref 0–29)

## 2024-08-16 PROCEDURE — 82043 UR ALBUMIN QUANTITATIVE: CPT | Performed by: NURSE PRACTITIONER

## 2024-08-16 PROCEDURE — 82570 ASSAY OF URINE CREATININE: CPT | Performed by: NURSE PRACTITIONER

## 2024-08-16 RX ORDER — METFORMIN HYDROCHLORIDE 500 MG/1
1000 TABLET, EXTENDED RELEASE ORAL 2 TIMES DAILY
Qty: 84 TABLET | Refills: 7 | Status: SHIPPED | OUTPATIENT
Start: 2024-08-16

## 2024-08-16 RX ORDER — DAPAGLIFLOZIN 10 MG/1
10 TABLET, FILM COATED ORAL DAILY
Qty: 21 TABLET | Refills: 7 | Status: SHIPPED | OUTPATIENT
Start: 2024-08-16

## 2024-08-16 NOTE — PROGRESS NOTES
Chief Complaint   Patient presents with    Type 2 diabetes mellitus with hyperglycemia, without long-t        David Sanchez is a 43 y.o. male had concerns including Type 2 diabetes mellitus with hyperglycemia, without long-t.    T2DM.     He is checking blood sugars rarely.  Current medications for diabetes include Metformin 1,000 mg BID, Farxiga 10 mg QD, Januvia 100 mg QD.  Most recent optho exam: is upcoming  Place of optho exam: [facility]  Hypos: none  He has not been following any dietary/exercise guidelines.  He does not plan to change this at this time.    He is on Lisinopril.      The following portions of the patient's history were reviewed and updated as appropriate: allergies, current medications, past family history, past medical history, past social history, past surgical history and problem list.      Review of Systems   Constitutional:  Positive for fatigue.   Eyes: Negative.    Endocrine: Negative for polydipsia, polyphagia and polyuria.   Musculoskeletal:  Positive for arthralgias.   Psychiatric/Behavioral:  Negative for sleep disturbance.    All other systems reviewed and are negative.       Physical Exam  Vitals reviewed.   Constitutional:       Appearance: Normal appearance.   Eyes:      Extraocular Movements: Extraocular movements intact.   Cardiovascular:      Rate and Rhythm: Normal rate.   Pulmonary:      Effort: Pulmonary effort is normal.   Feet:      Comments:      Neurological:      General: No focal deficit present.      Mental Status: He is alert and oriented to person, place, and time.   Psychiatric:         Mood and Affect: Mood normal.         Behavior: Behavior normal.         Thought Content: Thought content normal.         Judgment: Judgment normal.        /88 (BP Location: Right arm, Patient Position: Sitting, Cuff Size: Small Adult)   Pulse 88   Wt 95.8 kg (211 lb 3.2 oz)   SpO2 98%   BMI 26.40 kg/m²      Labs and imaging    CMP:  Lab Results   Component Value  Date    BUN 18 06/14/2024    CREATININE 1.03 06/14/2024    BCR 17.5 06/14/2024     06/14/2024    K 4.2 06/14/2024    CO2 23.3 06/14/2024    CALCIUM 9.4 06/14/2024    PROTENTOTREF 7.0 10/07/2022    ALBUMIN 4.8 05/20/2024    LABGLOBREF 2.3 10/07/2022    LABIL2 2.0 10/07/2022    BILITOT 1.1 05/20/2024    ALKPHOS 72 05/20/2024    AST 31 05/20/2024    ALT 74 (H) 05/20/2024     Lipid Panel:  Lab Results   Component Value Date    CHOL 210 (H) 05/20/2024    TRIG 318 (H) 05/20/2024    HDL 43 05/20/2024    VLDL 55 (H) 05/20/2024     (H) 05/20/2024     HbA1c:  Lab Results   Component Value Date    HGBA1C 6.4 (A) 08/16/2024    HGBA1C 7.9 (A) 02/02/2024     Glucose:      Lab Results   Component Value Date    POCGLU 150 (A) 08/16/2024     Microalbumin:  Lab Results   Component Value Date    MALBCRERATIO 5.5 01/19/2023     TSH:  Lab Results   Component Value Date    TSH 1.760 05/20/2024       Assessment and plan  Diagnoses and all orders for this visit:    1. Type 2 diabetes mellitus with hyperglycemia, without long-term current use of insulin (Primary)  Assessment & Plan:  -Diabetes is stable with A1c 6.4.  -Discussed dietary and exercise guidelines.  -Discussed importance of yearly eye exams.  -Discussed importance of maintaining optimal blood sugars.  -Continue metformin 1000 mg twice daily.  -Continue Farxiga 10 mg daily. Discussed the medication's MOA and that it may cause more frequent urination particularly upon starting the medication. Take one tablet in the morning with or without food. Encouraged to drink plenty of water as to not get dehydrated especially in warmer weather or with activity. Also discussed there may be an increased incidence of UTIs or yeast infections and to monitor for signs/symptoms.  -Continue Januvia 100 mg QD.  -Signs and symptoms of hypoglycemia reviewed with rule 15's advised.  -Follow-up in 6 months.    Orders:  -     POC Glycosylated Hemoglobin (Hb A1C)  -     POC Glucose, Blood  -      Microalbumin / Creatinine Urine Ratio - Urine, Clean Catch         Return in about 6 months (around 2/16/2025) for Follow-up appointment, A1C. The patient was instructed to contact the clinic with any interval questions or concerns.    This document has been electronically signed by AKIL Byrne  August 16, 2024 09:00 EDT  Endocrinology    Please note that portions of this document were completed with a voice recognition program. Efforts were made to edit the dictations, but occasionally words are mis-transcribed.

## 2024-08-16 NOTE — PROGRESS NOTES
Specialty Pharmacy Patient Management Program  Endocrinology Reassessment     David Sanchez is a 43 y.o. male with Type 2 Diabetes seen by an Endocrinology Provider and enrolled in the Endocrinology Patient Management program offered by [facility]. A follow-up was conducted, including assessment of continued therapy appropriateness, medication adherence, and side effect incidence and management for Metformin, Januvia, and Farxiga.     Patient is taking metformin ER 2,000 mg daily, Januvia 100 mg daily and Farxiga 10 mg daily to control BG. He reports tolerating the medications well and denies side effects. He reports occasionally missing doses of diabetes medications. He doesn't routinely monitor his BG at home. He denies any signs/symptoms of hypoglycemia at this time.    Changes to Insurance Coverage or Financial Support  None     Relevant Past Medical History and Comorbidities  Relevant medical history and concomitant health conditions were discussed with the patient. The patient's chart has been reviewed for relevant past medical history and comorbid health conditions and updated as necessary.   Past Medical History:   Diagnosis Date    Colon polyp     Disorder associated with type 2 diabetes mellitus     Elevated cholesterol     Hypercholesterolemia     Hypertension     Type 2 diabetes mellitus 2017     Social History     Socioeconomic History    Marital status:    Tobacco Use    Smoking status: Former     Current packs/day: 0.00     Average packs/day: 0.3 packs/day for 7.0 years (1.8 ttl pk-yrs)     Types: Cigarettes, Electronic Cigarette     Start date: 2000     Quit date: 2008     Years since quittin.6     Passive exposure: Never    Smokeless tobacco: Never   Vaping Use    Vaping status: Some Days   Substance and Sexual Activity    Alcohol use: Yes     Alcohol/week: 10.0 standard drinks of alcohol     Types: 10 Cans of beer per week     Comment: occ    Drug  use: Never    Sexual activity: Yes     Partners: Female     Birth control/protection: I.U.D.       Problem list reviewed by Breanna Ramirez PharmD on 8/16/2024 at  9:16 AM    Allergies  Known allergies and reactions were discussed with the patient. The patient's chart has been reviewed for allergy information and updated as necessary.   Patient has no known allergies.    Allergies reviewed by Breanna Ramirez PharmD on 8/16/2024 at  9:16 AM    Relevant Laboratory Values  A1C Last 3 Results          2/2/2024    15:08 8/16/2024    08:26   HGBA1C Last 3 Results   Hemoglobin A1C 7.9  6.4      Lab Results   Component Value Date    HGBA1C 6.4 (A) 08/16/2024     Lab Results   Component Value Date    GLUCOSE 215 (H) 06/14/2024    CALCIUM 9.4 06/14/2024     06/14/2024    K 4.2 06/14/2024    CO2 23.3 06/14/2024     06/14/2024    BUN 18 06/14/2024    CREATININE 1.03 06/14/2024    BCR 17.5 06/14/2024    ANIONGAP 13.7 06/14/2024     Lab Results   Component Value Date    CHOL 210 (H) 05/20/2024    CHLPL 219 (H) 10/07/2022    TRIG 318 (H) 05/20/2024    HDL 43 05/20/2024     (H) 05/20/2024       Current Medication List  This medication list has been reviewed with the patient and evaluated for any interactions or necessary modifications/recommendations, and updated to include all prescription medications, OTC medications, and supplements the patient is currently taking.  This list reflects what is contained in the patient's profile, which has also been marked as reviewed to communicate to other providers it is the most up to date version of the patient's current medication therapy.     Current Outpatient Medications:     Cholecalciferol 25 MCG (1000 UT) tablet, Take 1 tablet by mouth Daily., Disp: , Rfl:     hydrOXYzine (ATARAX) 10 MG tablet, Take 1 tablet by mouth Daily As Needed for Anxiety., Disp: 60 tablet, Rfl: 2    ibuprofen (ADVIL,MOTRIN) 800 MG tablet, Take 1 tablet by mouth Every 8 (Eight) Hours As Needed  for pain., Disp: 20 tablet, Rfl: 1    Lancets (OneTouch Delica Plus [id number]) misc, Use to test blood sugar twice daily, Disp: 100 each, Rfl: 5    lisinopril (PRINIVIL,ZESTRIL) 5 MG tablet, Take 1 tablet by mouth Daily., Disp: 21 tablet, Rfl: 7    loratadine (CLARITIN) 10 MG tablet, Take 1 tablet by mouth Daily., Disp: 90 tablet, Rfl: 1    magnesium oxide (MAG-OX) 400 MG tablet, Take 1 tablet by mouth Daily., Disp: , Rfl:     metoprolol succinate XL (TOPROL-XL) 25 MG 24 hr tablet, Take 2 tablets by mouth Daily. (Patient taking differently: Take 1 tablet by mouth Daily.), Disp: 180 tablet, Rfl: 1    omeprazole (priLOSEC) 40 MG capsule, Take 1 capsule by mouth Daily., Disp: 30 capsule, Rfl: 2    OneTouch Verio test strip, Use to test blood sugar once daily (Patient taking differently: Use to test blood sugar once daily), Disp: 100 each, Rfl: 3    rosuvastatin (Crestor) 5 MG tablet, Take 1 tablet by mouth Daily., Disp: 30 tablet, Rfl: 2    Thiamine Mononitrate (B1) 100 MG tablet, Take 100 mg by mouth Daily., Disp: , Rfl:     citalopram (CeleXA) 10 MG tablet, Take 1 tablet by mouth Daily. (Patient not taking: Reported on 7/12/2024), Disp: 30 tablet, Rfl: 2    dapagliflozin Propanediol (Farxiga) 10 MG tablet, Take 1 tablet by mouth Daily., Disp: 21 tablet, Rfl: 7    metFORMIN ER (GLUCOPHAGE-XR) 500 MG 24 hr tablet, Take 2 tablets by mouth 2 (Two) Times a Day., Disp: 84 tablet, Rfl: 7    sildenafil (Viagra) 50 MG tablet, Take 1/2 tablet by mouth Daily As Needed for Erectile Dysfunction. (Patient not taking: Reported on 8/16/2024), Disp: 30 tablet, Rfl: 0    SITagliptin (Januvia) 100 MG tablet, Take 1 tablet by mouth Daily., Disp: 90 tablet, Rfl: 1  No current facility-administered medications for this visit.    Medicines reviewed by Breanna Ramirez, PharmD on 8/16/2024 at  9:16 AM    Drug Interactions  Coadministration of metformin and IBU may increase the risk of acute renal failure and lactic acidosis - provider will  continue to routinely monitor kidney function (he only uses IBU PRN)  Lisinopril may enhance the adverse/toxic effect of metformin. This includes both a risk for hypoglycemia and for lactic acidosis.    Adverse Drug Reactions  Adverse Reactions Experienced: None  Plan for ADR Management: Not Required    Hospitalizations and Urgent Care Since Last Assessment  Hospitalizations or Admissions: None  ED Visits: None  Urgent Office Visits: None    Adherence and Self-Administration  Adherence related patient's specialty therapy was discussed with the patient. The Adherence segment of this outreach has been reviewed and updated.     Ongoing or New Barriers to Patient Adherence and/or Self-Administration: None   Methods for Supporting Patient Adherence and/or Self-Administration: None Required     Goals of Therapy  Goals related to the patient's specialty therapy was discussed with the patient. The Patient Goals segment of this outreach has been reviewed and updated.    Goals Addressed Today        Consistently take medications as prescribed      HEMOGLOBIN A1C < 7              Specialty Pharmacy General Goal      Prevent hypoglcyemia            Reassessment Plan & Follow-Up  Patient's diabetes is controlled with A1C of 6.4%.   Recent Provider Medication Therapy Changes: None   Additional Plans, Therapy Recommendations, or Therapy Problems to Be Addressed:   No pharmacologic adjustments recommended at this time.   Will send updated RX's to [facility] for shipping services.   Pharmacist to perform regular reassessments no more than (6) months from the previous assessment.  Care Coordinator to set up future refill outreaches, coordinate prescription delivery, and escalate clinical questions to pharmacist.     Attestation  I attest the patient was actively involved in and has agreed to the above plan of care. If the prescribed therapy is at any point deemed not appropriate based on the current or future assessments, a  consultation will be initiated with the patient's specialty care provider to determine the best course of action. The revised plan of therapy will be documented along with any required assessments and/or additional patient education provided.     Uzma Foote, Pharmacy Intern   Breanna Ramirez, PharmD, LISA MOTTA  Clinical Specialty Pharmacist, Endocrinology  8/16/2024  09:23 EDT

## 2024-08-16 NOTE — ASSESSMENT & PLAN NOTE
-Diabetes is stable with A1c 6.4.  -Discussed dietary and exercise guidelines.  -Discussed importance of yearly eye exams.  -Discussed importance of maintaining optimal blood sugars.  -Continue metformin 1000 mg twice daily.  -Continue Farxiga 10 mg daily. Discussed the medication's MOA and that it may cause more frequent urination particularly upon starting the medication. Take one tablet in the morning with or without food. Encouraged to drink plenty of water as to not get dehydrated especially in warmer weather or with activity. Also discussed there may be an increased incidence of UTIs or yeast infections and to monitor for signs/symptoms.  -Continue Januvia 100 mg QD.  -Signs and symptoms of hypoglycemia reviewed with rule 15's advised.  -Follow-up in 6 months.

## 2024-08-23 ENCOUNTER — SPECIALTY PHARMACY (OUTPATIENT)
Dept: PHARMACY | Facility: HOSPITAL | Age: 44
End: 2024-08-23
Payer: COMMERCIAL

## 2024-08-23 NOTE — PROGRESS NOTES
Specialty Pharmacy Refill Coordination Note     David is a 43 y.o. male contacted today regarding refills of  Farxiga, Januvia, Metformin specialty medication(s).    Reviewed and verified with patient:       Specialty medication(s) and dose(s) confirmed: yes    Refill Questions      Flowsheet Row Most Recent Value   Changes to allergies? No   Changes to medications? No   New conditions or infections since last clinic visit No   Unplanned office visit, urgent care, ED, or hospital admission in the last 4 weeks  No   How does patient/caregiver feel medication is working? Very good   Financial problems or insurance changes  No   Since the previous refill, were any specialty medication doses or scheduled injections missed or delayed?  No   Why were doses missed? na   Does this patient require a clinical escalation to a pharmacist? No            Delivery Questions      Flowsheet Row Most Recent Value   Delivery method [employer]   Delivery address verified with patient/caregiver? Yes   Delivery address Home   Number of medications in delivery 9   Medication(s) being filled and delivered Citalopram Hydrobromide, Dapagliflozin Propanediol, Metformin Hcl (Biguanides), Sitagliptin Phosphate, Hydroxyzine Hcl (Antianxiety Agents - Misc.), Loratadine (Antihistamines - Non-Sedating), Metoprolol Succinate, Rosuvastatin Calcium (Hmg Coa Reductase Inhibitors), Omeprazole (Proton Pump Inhibitors)   Doses left of specialty medications na   Copay verified? Yes   Copay amount 35.88   Copay form of payment Credit/debit on file   Ship Date 08/26   Delivery Date 08/27   Signature Required Yes                   Follow-up: 21 day(s)     Alda Arnett, Pharmacy Technician  Specialty Pharmacy Technician

## 2024-09-18 ENCOUNTER — SPECIALTY PHARMACY (OUTPATIENT)
Dept: PHARMACY | Facility: HOSPITAL | Age: 44
End: 2024-09-18
Payer: COMMERCIAL

## 2024-10-11 ENCOUNTER — OFFICE VISIT (OUTPATIENT)
Dept: FAMILY MEDICINE CLINIC | Facility: CLINIC | Age: 44
End: 2024-10-11
Payer: COMMERCIAL

## 2024-10-11 ENCOUNTER — SPECIALTY PHARMACY (OUTPATIENT)
Dept: PHARMACY | Facility: HOSPITAL | Age: 44
End: 2024-10-11
Payer: COMMERCIAL

## 2024-10-11 VITALS
WEIGHT: 211.2 LBS | SYSTOLIC BLOOD PRESSURE: 122 MMHG | HEART RATE: 90 BPM | HEIGHT: 75 IN | TEMPERATURE: 96.6 F | OXYGEN SATURATION: 96 % | BODY MASS INDEX: 26.26 KG/M2 | DIASTOLIC BLOOD PRESSURE: 86 MMHG

## 2024-10-11 DIAGNOSIS — R07.89 ATYPICAL CHEST PAIN: ICD-10-CM

## 2024-10-11 DIAGNOSIS — K21.9 GASTROESOPHAGEAL REFLUX DISEASE WITHOUT ESOPHAGITIS: Primary | ICD-10-CM

## 2024-10-11 DIAGNOSIS — E11.9 TYPE 2 DIABETES MELLITUS WITHOUT COMPLICATION, WITHOUT LONG-TERM CURRENT USE OF INSULIN: Chronic | ICD-10-CM

## 2024-10-11 DIAGNOSIS — M19.09 PRIMARY OSTEOARTHRITIS OF OTHER SITE: ICD-10-CM

## 2024-10-11 PROCEDURE — 99214 OFFICE O/P EST MOD 30 MIN: CPT | Performed by: STUDENT IN AN ORGANIZED HEALTH CARE EDUCATION/TRAINING PROGRAM

## 2024-10-11 RX ORDER — IBUPROFEN 800 MG/1
800 TABLET, FILM COATED ORAL EVERY 8 HOURS PRN
Qty: 20 TABLET | Refills: 1 | Status: SHIPPED | OUTPATIENT
Start: 2024-10-11

## 2024-10-11 RX ORDER — FAMOTIDINE 20 MG/1
20 TABLET, FILM COATED ORAL 2 TIMES DAILY
Qty: 60 TABLET | Refills: 5 | Status: SHIPPED | OUTPATIENT
Start: 2024-10-11

## 2024-10-11 NOTE — PROGRESS NOTES
Chief Complaint  Mixed hyperlipidemia    HPI:   HPI   David Sanchez is a 43 y.o. male who presents today to [facility] for Mixed hyperlipidemia.  43-year-old male with past medical history of type 2 diabetes presents for follow-up of atypical chest pain.  Patient initially was suspected to have GERD.  He was treated with omeprazole, he reports that this resolved his symptoms.  He has had a workup from cardiology including a CTA of his coronary arteries which shows a calcium score of 0.  The patient reports that he has been able to titrate off his omeprazole and take only once every 3 days.  He reports his chest pains have completely resolved she has no other complaints.    Previous History:   Past Medical History:   Diagnosis Date    Colon polyp     Disorder associated with type 2 diabetes mellitus     Elevated cholesterol     Hypercholesterolemia     Hypertension     Type 2 diabetes mellitus       Past Surgical History:   Procedure Laterality Date    APPENDECTOMY      COLONOSCOPY N/A 2023    Procedure: COLONOSCOPY;  Surgeon: Satnam Garnett MD;  [place] [facility]  Service: Gastroenterology;  Laterality: N/A;    EYE SURGERY      LASIK        Social History     Socioeconomic History    Marital status:    Tobacco Use    Smoking status: Former     Current packs/day: 0.00     Average packs/day: 0.3 packs/day for 7.0 years (1.8 ttl pk-yrs)     Types: Cigarettes, Electronic Cigarette     Start date: 2000     Quit date: 2008     Years since quittin.7     Passive exposure: Never    Smokeless tobacco: Never   Vaping Use    Vaping status: Some Days   Substance and Sexual Activity    Alcohol use: Yes     Alcohol/week: 10.0 standard drinks of alcohol     Types: 10 Cans of beer per week     Comment: occ    Drug use: Never    Sexual activity: Yes     Partners: Female     Birth control/protection: I.U.D.      Health Maintenance Due   Topic Date Due     DIABETIC EYE EXAM  Never done    Hepatitis B (2 of 3 - 3-dose series) 09/01/1998    HEPATITIS C SCREENING  Never done    ANNUAL PHYSICAL  02/24/2024    COVID-19 Vaccine (4 - 2023-24 season) 09/01/2024        Current Medications:  Current Outpatient Medications   Medication Sig Dispense Refill    Cholecalciferol 25 MCG (1000 UT) tablet Take 1 tablet by mouth Daily.      dapagliflozin Propanediol (Farxiga) 10 MG tablet Take 1 tablet by mouth Daily. 21 tablet 7    hydrOXYzine (ATARAX) 10 MG tablet Take 1 tablet by mouth Daily As Needed for Anxiety. 60 tablet 2    ibuprofen (ADVIL,MOTRIN) 800 MG tablet Take 1 tablet by mouth Every 8 (Eight) Hours As Needed for pain. 20 tablet 1    lisinopril (PRINIVIL,ZESTRIL) 5 MG tablet Take 1 tablet by mouth Daily. 21 tablet 7    loratadine (CLARITIN) 10 MG tablet Take 1 tablet by mouth Daily. 90 tablet 1    magnesium oxide (MAG-OX) 400 MG tablet Take 1 tablet by mouth Daily.      metFORMIN ER (GLUCOPHAGE-XR) 500 MG 24 hr tablet Take 2 tablets by mouth 2 (Two) Times a Day. 84 tablet 7    metoprolol succinate XL (TOPROL-XL) 25 MG 24 hr tablet Take 2 tablets by mouth Daily. (Patient taking differently: Take 1 tablet by mouth Daily.) 180 tablet 1    OneTouch Verio test strip Use to test blood sugar once daily (Patient taking differently: Use to test blood sugar once daily) 100 each 3    rosuvastatin (Crestor) 5 MG tablet Take 1 tablet by mouth Daily. 30 tablet 2    sildenafil (Viagra) 50 MG tablet Take 1/2 tablet by mouth Daily As Needed for Erectile Dysfunction. 30 tablet 0    SITagliptin (Januvia) 100 MG tablet Take 1 tablet by mouth Daily. 90 tablet 1    Thiamine Mononitrate (B1) 100 MG tablet Take 100 mg by mouth Daily.      famotidine (Pepcid) 20 MG tablet Take 1 tablet by mouth 2 (Two) Times a Day. 60 tablet 5    Lancets (OneTouch Delica Plus [id number]) misc Use to test blood sugar twice daily (Patient not taking: Reported on 10/11/2024) 100 each 5     No current  "facility-administered medications for this visit.       Allergies:   No Known Allergies    Vitals:   /86 (BP Location: Right arm, Patient Position: Sitting, Cuff Size: Adult)   Pulse 90   Temp 96.6 °F (35.9 °C) (Temporal)   Ht 190.5 cm (75\")   Wt 95.8 kg (211 lb 3.2 oz)   SpO2 96%   BMI 26.40 kg/m²     Estimated body mass index is 26.4 kg/m² as calculated from the following:    Height as of this encounter: 190.5 cm (75\").    Weight as of this encounter: 95.8 kg (211 lb 3.2 oz).    David Sanchez  reports that he quit smoking about 16 years ago. His smoking use included cigarettes and electronic cigarette. He started smoking about 23 years ago. He has a 1.8 pack-year smoking history. He has never been exposed to tobacco smoke. He has never used smokeless tobacco.          Physical Exam:   Physical Exam  Constitutional:       Appearance: Normal appearance.   HENT:      Mouth/Throat:      Mouth: Mucous membranes are moist.   Cardiovascular:      Rate and Rhythm: Normal rate and regular rhythm.   Pulmonary:      Effort: Pulmonary effort is normal.      Breath sounds: Normal breath sounds. No wheezing or rhonchi.   Musculoskeletal:         General: Normal range of motion.   Skin:     General: Skin is warm and dry.   Neurological:      Mental Status: He is alert.   Psychiatric:         Mood and Affect: Mood normal.          Lab Results:   Office Visit on 08/16/2024   Component Date Value Ref Range Status    Hemoglobin A1C 08/16/2024 6.4 (A)  4.5 - 5.7 % Final    Lot Number 08/16/2024 [id number]   Final    Expiration Date 08/16/2024 2026-04-04   Final    Glucose 08/16/2024 150 (A)  70 - 130 mg/dL Final    Microalbumin/Creatinine Ratio 08/16/2024 9.9  0.0 - 29.0 mg/g Final    Creatinine, Urine 08/16/2024 141.9  mg/dL Final    Microalbumin, Urine 08/16/2024 1.4  mg/dL Final   Lab on 06/14/2024   Component Date Value Ref Range Status    Glucose 06/14/2024 215 (H)  65 - 99 mg/dL Final    BUN 06/14/2024 18  6 - 20 " mg/dL Final    Creatinine 06/14/2024 1.03  0.76 - 1.27 mg/dL Final    Sodium 06/14/2024 140  136 - 145 mmol/L Final    Potassium 06/14/2024 4.2  3.5 - 5.2 mmol/L Final    Chloride 06/14/2024 103  98 - 107 mmol/L Final    CO2 06/14/2024 23.3  22.0 - 29.0 mmol/L Final    Calcium 06/14/2024 9.4  8.6 - 10.5 mg/dL Final    BUN/Creatinine Ratio 06/14/2024 17.5  7.0 - 25.0 Final    Anion Gap 06/14/2024 13.7  5.0 - 15.0 mmol/L Final    eGFR 06/14/2024 92.4  >60.0 mL/min/1.73 Final    proBNP 06/14/2024 58.5  0.0 - 450.0 pg/mL Final    Magnesium 06/14/2024 1.8  1.6 - 2.6 mg/dL Final   Hospital Outpatient Visit on 06/07/2024   Component Date Value Ref Range Status    Nuclear Prior Study 06/07/2024 3.0   Final     CV REST NUCLEAR ISOTOPE DOSE 06/07/2024 10.4  mCi Final     CV STRESS NUCLEAR ISOTOPE DOSE 06/07/2024 30.4  mCi Final    Exercise duration (min) 06/07/2024 9  min Final    Exercise duration (sec) 06/07/2024 31  sec Final    Estimated workload 06/07/2024 11.8  METS Final     CV STRESS PROTOCOL 1 06/07/2024 [place]   Final    Stage 1 06/07/2024 1.0   Final    HR Stage 1 06/07/2024 118   Final    BP Stage 1 06/07/2024 157/95   Final    Duration Min Stage 1 06/07/2024 3   Final    Duration Sec Stage 1 06/07/2024 0   Final    Grade Stage 1 06/07/2024 10   Final    Speed Stage 1 06/07/2024 1.7   Final     CV STRESS METS STAGE 1 06/07/2024 5.0   Final    Stress Dose Regadenoson Stage 1 06/07/2024 0.40   Final    Stress Comments Stage 1 06/07/2024 10 sec bolus injection   Final    Stage 2 06/07/2024 2.0   Final    HR Stage 2 06/07/2024 131   Final    BP Stage 2 06/07/2024 163/93   Final    Duration Min Stage 2 06/07/2024 3   Final    Duration Sec Stage 2 06/07/2024 0   Final    Grade Stage 2 06/07/2024 12   Final    Speed Stage 2 06/07/2024 2.5   Final    BH CV STRESS METS STAGE 2 06/07/2024 7.5   Final    Stage 3 06/07/2024 3.0   Final    HR Stage 3 06/07/2024 150   Final    BP Stage 3 06/07/2024 181/88   Final     Duration Min Stage 3 06/07/2024 3   Final    Duration Sec Stage 3 06/07/2024 0   Final    Grade Stage 3 06/07/2024 14   Final    Speed Stage 3 06/07/2024 3.4   Final    BH CV STRESS METS STAGE 3 06/07/2024 10.0   Final    Stage 4 06/07/2024 4.0   Final    HR Stage 4 06/07/2024 153   Final    Duration Sec Stage 4 06/07/2024 31   Final    Grade Stage 4 06/07/2024 16   Final    Speed Stage 4 06/07/2024 4.2   Final    BH CV STRESS METS STAGE 4 06/07/2024 13.5   Final    Baseline HR 06/07/2024 98  bpm Final    Baseline BP 06/07/2024 150/92  mmHg Final    Peak HR 06/07/2024 153  bpm Final    Peak BP 06/07/2024 181/88  mmHg Final    Recovery HR 06/07/2024 103  bpm Final    Recovery BP 06/07/2024 141/84  mmHg Final    Target HR (85%) 06/07/2024 150  bpm Final    Max. Pred. HR (100%) 06/07/2024 177  bpm Final    Percent Max Pred HR 06/07/2024 86.44  % Final    Percent Target HR 06/07/2024 102  % Final    Nuc Stress EF 06/07/2024 34  % Final   Hospital Outpatient Visit on 06/07/2024   Component Date Value Ref Range Status    LVIDd 06/07/2024 5.6  cm Final    LVIDs 06/07/2024 4.4  cm Final    IVSd 06/07/2024 0.90  cm Final    LVPWd 06/07/2024 0.90  cm Final    FS 06/07/2024 21.4  % Final    IVS/LVPW 06/07/2024 1.00  cm Final    ESV(cubed) 06/07/2024 85.2  ml Final    LV Sys Vol (BSA corrected) 06/07/2024 24.8  cm2 Final    EDV(cubed) 06/07/2024 175.6  ml Final    LV Mg Vol (BSA corrected) 06/07/2024 44.9  cm2 Final    LV mass(C)d 06/07/2024 191.6  grams Final    LVOT area 06/07/2024 4.2  cm2 Final    LVOT diam 06/07/2024 2.30  cm Final    EDV(MOD-sp4) 06/07/2024 100.0  ml Final    ESV(MOD-sp4) 06/07/2024 55.2  ml Final    SV(MOD-sp4) 06/07/2024 44.8  ml Final    SVi(MOD-SP4) 06/07/2024 20.1  ml/m2 Final    EF(MOD-sp4) 06/07/2024 44.8  % Final    MV E max dell 06/07/2024 62.9  cm/sec Final    MV A max dell 06/07/2024 72.2  cm/sec Final    MV E/A 06/07/2024 0.87   Final    LA ESV Index (BP) 06/07/2024 9.7  ml/m2 Final    Med  Peak E' Ollie 06/07/2024 7.8  cm/sec Final    Lat Peak E' Ollie 06/07/2024 12.9  cm/sec Final    Avg E/e' ratio 06/07/2024 6.08   Final    LA dimension (2D)  06/07/2024 3.4  cm Final    Ao pk ollie 06/07/2024 133.0  cm/sec Final    Ao max PG 06/07/2024 7.1  mmHg Final    Ao mean PG 06/07/2024 5.0  mmHg Final    Ao V2 VTI 06/07/2024 23.5  cm Final    PA acc time 06/07/2024 0.12  sec Final    Ao root diam 06/07/2024 3.6  cm Final    ACS 06/07/2024 1.80  cm Final   Lab on 05/20/2024   Component Date Value Ref Range Status    H. pylori Breath Test 05/20/2024 Negative  Negative Final    WBC 05/20/2024 5.76  3.40 - 10.80 10*3/mm3 Final    RBC 05/20/2024 5.44  4.14 - 5.80 10*6/mm3 Final    Hemoglobin 05/20/2024 16.4  13.0 - 17.7 g/dL Final    Hematocrit 05/20/2024 46.1  37.5 - 51.0 % Final    MCV 05/20/2024 84.7  79.0 - 97.0 fL Final    MCH 05/20/2024 30.1  26.6 - 33.0 pg Final    MCHC 05/20/2024 35.6  31.5 - 35.7 g/dL Final    RDW 05/20/2024 12.7  12.3 - 15.4 % Final    RDW-SD 05/20/2024 38.7  37.0 - 54.0 fl Final    MPV 05/20/2024 12.2 (H)  6.0 - 12.0 fL Final    Platelets 05/20/2024 242  140 - 450 10*3/mm3 Final    Glucose 05/20/2024 172 (H)  65 - 99 mg/dL Final    BUN 05/20/2024 17  6 - 20 mg/dL Final    Creatinine 05/20/2024 1.01  0.76 - 1.27 mg/dL Final    Sodium 05/20/2024 139  136 - 145 mmol/L Final    Potassium 05/20/2024 4.1  3.5 - 5.2 mmol/L Final    Chloride 05/20/2024 101  98 - 107 mmol/L Final    CO2 05/20/2024 26.0  22.0 - 29.0 mmol/L Final    Calcium 05/20/2024 9.7  8.6 - 10.5 mg/dL Final    Total Protein 05/20/2024 7.2  6.0 - 8.5 g/dL Final    Albumin 05/20/2024 4.8  3.5 - 5.2 g/dL Final    ALT (SGPT) 05/20/2024 74 (H)  1 - 41 U/L Final    AST (SGOT) 05/20/2024 31  1 - 40 U/L Final    Alkaline Phosphatase 05/20/2024 72  39 - 117 U/L Final    Total Bilirubin 05/20/2024 1.1  0.0 - 1.2 mg/dL Final    Globulin 05/20/2024 2.4  gm/dL Final    A/G Ratio 05/20/2024 2.0  g/dL Final    BUN/Creatinine Ratio 05/20/2024 16.8   7.0 - 25.0 Final    Anion Gap 05/20/2024 12.0  5.0 - 15.0 mmol/L Final    eGFR 05/20/2024 94.6  >60.0 mL/min/1.73 Final    TSH 05/20/2024 1.760  0.270 - 4.200 uIU/mL Final    Total Cholesterol 05/20/2024 210 (H)  0 - 200 mg/dL Final    Triglycerides 05/20/2024 318 (H)  0 - 150 mg/dL Final    HDL Cholesterol 05/20/2024 43  40 - 60 mg/dL Final    LDL Cholesterol  05/20/2024 112 (H)  0 - 100 mg/dL Final    VLDL Cholesterol 05/20/2024 55 (H)  5 - 40 mg/dL Final    LDL/HDL Ratio 05/20/2024 2.40   Final       Assessment and Plan  Diagnoses and all orders for this visit:    1. Gastroesophageal reflux disease without esophagitis (Primary)  -     famotidine (Pepcid) 20 MG tablet; Take 1 tablet by mouth 2 (Two) Times a Day.  Dispense: 60 tablet; Refill: 5    2. Atypical chest pain    3. Primary osteoarthritis of other site  -     ibuprofen (ADVIL,MOTRIN) 800 MG tablet; Take 1 tablet by mouth Every 8 (Eight) Hours As Needed for pain.  Dispense: 20 tablet; Refill: 1    4. Type 2 diabetes mellitus without complication, without long-term current use of insulin      Patient has GERD leading to atypical chest pain, has resolved with omeprazole, will try to titrate off omeprazole and switch to as needed famotidine.  If he develops refractory reflux will consider restarting PPI.  Same as above, echocardiogram CT angiogram reviewed.  The only abnormality is his stress test showed an EF of 34% this is unlikely to be as true ejection fraction as echocardiogram revealed an EF of 50%.  Plan is to repeat echocardiogram in the future.  Patient has no signs of CHF.  Continue as needed ibuprofen  Followed by endocrinology, A1c is now at goal, no changes to current management.          New Medications:   New Medications Ordered This Visit   Medications    ibuprofen (ADVIL,MOTRIN) 800 MG tablet     Sig: Take 1 tablet by mouth Every 8 (Eight) Hours As Needed for pain.     Dispense:  20 tablet     Refill:  1    famotidine (Pepcid) 20 MG tablet      Sig: Take 1 tablet by mouth 2 (Two) Times a Day.     Dispense:  60 tablet     Refill:  5       Discontinued Medications:   Medications Discontinued During This Encounter   Medication Reason    omeprazole (priLOSEC) 40 MG capsule     ibuprofen (ADVIL,MOTRIN) 800 MG tablet Reorder    citalopram (CeleXA) 10 MG tablet *Therapy completed                 Follow Up:   Return in about 6 months (around 4/11/2025).    Patient was given instructions and counseling regarding his condition or for health maintenance advice. Please see specific information pulled into the AVS if appropriate.       This document has been electronically signed by Ernie Bell DO   October 11, 2024 08:44 EDT    Dictated Utilizing Dragon Dictation: Part of this note may be an electronic transcription/translation of spoken language to printed text using the Dragon Dictation System.

## 2024-10-11 NOTE — PROGRESS NOTES
Specialty Pharmacy Refill Coordination Note     David is a 43 y.o. male contacted today regarding refills of  Metformin, Farxiga, Januvia specialty medication(s).    Reviewed and verified with patient:       Specialty medication(s) and dose(s) confirmed: yes    Refill Questions      Flowsheet Row Most Recent Value   Changes to allergies? No   Changes to medications? No   New conditions or infections since last clinic visit No   Unplanned office visit, urgent care, ED, or hospital admission in the last 4 weeks  No   How does patient/caregiver feel medication is working? Very good   Financial problems or insurance changes  No   Since the previous refill, were any specialty medication doses or scheduled injections missed or delayed?  No   Why were doses missed? na   Does this patient require a clinical escalation to a pharmacist? No            Delivery Questions      Flowsheet Row Most Recent Value   Delivery method [employer]Ex   Delivery address verified with patient/caregiver? Yes   Delivery address Home   Number of medications in delivery 7   Medication(s) being filled and delivered Hydroxyzine Hcl (Antianxiety Agents - Misc.), Dapagliflozin Propanediol, Sitagliptin Phosphate, Loratadine (Antihistamines - Non-Sedating), Metformin Hcl (Biguanides), Metoprolol Succinate, Rosuvastatin Calcium (Hmg Coa Reductase Inhibitors)   Doses left of specialty medications na   Copay verified? Yes   Copay amount 29.96   Copay form of payment Credit/debit on file   Ship Date 10/14   Delivery Date 10/15   Signature Required No                   Follow-up: 30 day(s)     Alda Arnett, Pharmacy Technician  Specialty Pharmacy Technician

## 2024-10-21 ENCOUNTER — OFFICE VISIT (OUTPATIENT)
Dept: CARDIOLOGY | Facility: CLINIC | Age: 44
End: 2024-10-21
Payer: COMMERCIAL

## 2024-10-21 VITALS
BODY MASS INDEX: 26.61 KG/M2 | HEART RATE: 116 BPM | OXYGEN SATURATION: 97 % | SYSTOLIC BLOOD PRESSURE: 144 MMHG | HEIGHT: 75 IN | DIASTOLIC BLOOD PRESSURE: 92 MMHG | WEIGHT: 214 LBS

## 2024-10-21 DIAGNOSIS — Z91.89 MULTIPLE RISK FACTORS FOR CORONARY ARTERY DISEASE: ICD-10-CM

## 2024-10-21 DIAGNOSIS — R00.0 TACHYCARDIA: Primary | ICD-10-CM

## 2024-10-21 DIAGNOSIS — E78.00 HYPERCHOLESTEROLEMIA: ICD-10-CM

## 2024-10-21 DIAGNOSIS — E11.65 TYPE 2 DIABETES MELLITUS WITH HYPERGLYCEMIA, WITHOUT LONG-TERM CURRENT USE OF INSULIN: ICD-10-CM

## 2024-10-21 DIAGNOSIS — I51.9 LV DYSFUNCTION: ICD-10-CM

## 2024-10-21 DIAGNOSIS — I10 ESSENTIAL HYPERTENSION: ICD-10-CM

## 2024-10-21 PROCEDURE — 99214 OFFICE O/P EST MOD 30 MIN: CPT | Performed by: NURSE PRACTITIONER

## 2024-10-21 NOTE — LETTER
November 5, 2024     Mitch Reed,   [address] [place] [address]    Patient: David Sanchez   YOB: 1980   Date of Visit: 10/21/2024       Dear Mitch Reed,     David Sanchez was in my office today. Below is a copy of my note.    If you have questions, please do not hesitate to call me. I look forward to following David along with you.         Sincerely,        AKIL Leigh        CC: No Recipients    Subjective    David Sanchez is a 44 y.o. male.   Chief Complaint   Patient presents with   • Follow-up     History of Present Illness   David Sanchez is a 43-year-old male who presents to clinic today for cardiology follow-up.  He states overall he is feeling better and denies acute complaint.    Longstanding tachycardia, completed a holter monitor with atrial tachycardia and PAC/PVC noted. Heart rate well controlled on Metoprolol. He feels he is doing well on medication and denies side effects.  He states his heart rate has improved.  Had recommended increasing his metoprolol but he has cut back to 25 mg daily which he continues. He denies worsening palpitations, dizziness or syncope. He denies previous diagnosis of arrhythmia.  Recent labs with a normal TSH and CBC.  He does consume some caffeine.     DM currently managed by primary on oral medications and most recent hemoglobin A1c was 7.9.      Hyperlipidemia recently restarted Crestor.  He reports previously being on statin therapy but discontinued due to myalgias.  He has had both an intolerance to Lipitor and Crestor.  He reports doing well overall with Crestor since restarting. Have discussed alternative options for treatment with PCSK9 inhibitors however he defers currently.      Hypertension currently on lisinopril.  He denies home monitoring.  He denies medication side effects and reports compliance with medicine.     He completed a stress test with no ischemia however a decreased LVEF was noted on stress  test.  On echocardiogram his LVEF was noted at 46 to 50%.  Discussed further workup of CT imaging versus cardiac cath and he elected for CT imaging. CT imaging without significant stenosis or occlusion. He denies chest discomfort, dyspnea, orthopnea or abdominal/lower extremity swelling.  He recently had a normal BNP.  He remains on lisinopril, Farxiga and beta-blocker therapy.     Patient Active Problem List   Diagnosis   • Type 2 diabetes mellitus   • Hypercholesterolemia   • Essential hypertension   • Screening for colon cancer     Past Medical History:   Diagnosis Date   • Colon polyp    • Disorder associated with type 2 diabetes mellitus    • Elevated cholesterol    • Hypercholesterolemia    • Hypertension    • Type 2 diabetes mellitus      Past Surgical History:   Procedure Laterality Date   • APPENDECTOMY     • COLONOSCOPY N/A 2023    Procedure: COLONOSCOPY;  Surgeon: Satnam Garnett MD;  Location: [facility];  Service: Gastroenterology;  Laterality: N/A;   • EYE SURGERY     • LASIK       Family History   Problem Relation Age of Onset   • Diabetes Father    • Diabetes Maternal Grandmother      Social History     Tobacco Use   • Smoking status: Former     Current packs/day: 0.00     Average packs/day: 0.3 packs/day for 7.0 years (1.8 ttl pk-yrs)     Types: Cigarettes, Electronic Cigarette     Start date: 2000     Quit date: 2008     Years since quittin.8     Passive exposure: Never   • Smokeless tobacco: Never   Vaping Use   • Vaping status: Some Days   Substance Use Topics   • Alcohol use: Yes     Alcohol/week: 10.0 standard drinks of alcohol     Types: 10 Cans of beer per week     Comment: occ   • Drug use: Never     The following portions of the patient's history were reviewed and updated as appropriate: allergies, current medications, past family history, past medical history, past social history, past surgical history and problem list.    No Known Allergies      Current  Outpatient Medications:   •  Cholecalciferol 25 MCG (1000 UT) tablet, Take 1 tablet by mouth Daily., Disp: , Rfl:   •  dapagliflozin Propanediol (Farxiga) 10 MG tablet, Take 1 tablet by mouth Daily., Disp: 21 tablet, Rfl: 7  •  famotidine (Pepcid) 20 MG tablet, Take 1 tablet by mouth 2 (Two) Times a Day., Disp: 60 tablet, Rfl: 5  •  hydrOXYzine (ATARAX) 10 MG tablet, Take 1 tablet by mouth Daily As Needed for Anxiety., Disp: 60 tablet, Rfl: 2  •  ibuprofen (ADVIL,MOTRIN) 800 MG tablet, Take 1 tablet by mouth Every 8 (Eight) Hours As Needed for pain., Disp: 20 tablet, Rfl: 1  •  lisinopril (PRINIVIL,ZESTRIL) 5 MG tablet, Take 1 tablet by mouth Daily., Disp: 21 tablet, Rfl: 7  •  loratadine (CLARITIN) 10 MG tablet, Take 1 tablet by mouth Daily., Disp: 90 tablet, Rfl: 1  •  magnesium oxide (MAG-OX) 400 MG tablet, Take 1 tablet by mouth Daily., Disp: , Rfl:   •  metFORMIN ER (GLUCOPHAGE-XR) 500 MG 24 hr tablet, Take 2 tablets by mouth 2 (Two) Times a Day., Disp: 84 tablet, Rfl: 7  •  metoprolol succinate XL (TOPROL-XL) 25 MG 24 hr tablet, Take 2 tablets by mouth Daily. (Patient taking differently: Take 1 tablet by mouth Daily.), Disp: 180 tablet, Rfl: 1  •  OneTouch Verio test strip, Use to test blood sugar once daily (Patient taking differently: Use to test blood sugar once daily), Disp: 100 each, Rfl: 3  •  sildenafil (Viagra) 50 MG tablet, Take 1/2 tablet by mouth Daily As Needed for Erectile Dysfunction., Disp: 30 tablet, Rfl: 0  •  SITagliptin (Januvia) 100 MG tablet, Take 1 tablet by mouth Daily., Disp: 90 tablet, Rfl: 1  •  Thiamine Mononitrate (B1) 100 MG tablet, Take 100 mg by mouth Daily., Disp: , Rfl:   •  Lancets (OneTouch Delica Plus [id number]) misc, Use to test blood sugar twice daily (Patient not taking: Reported on 10/21/2024), Disp: 100 each, Rfl: 5  •  rosuvastatin (Crestor) 5 MG tablet, Take 1 tablet by mouth Daily., Disp: 30 tablet, Rfl: 2    Review of Systems   Constitutional:  Negative for activity  "change, appetite change, chills, diaphoresis, fatigue and fever.   HENT:  Negative for congestion, drooling, ear discharge, ear pain, mouth sores, nosebleeds, postnasal drip, rhinorrhea, sinus pressure, sneezing and sore throat.    Eyes:  Negative for pain, discharge and visual disturbance.   Respiratory:  Negative for cough, chest tightness, shortness of breath and wheezing.    Cardiovascular:  Negative for chest pain, palpitations and leg swelling.   Gastrointestinal:  Negative for abdominal pain, constipation, diarrhea, nausea and vomiting.   Endocrine: Negative for cold intolerance, heat intolerance, polydipsia, polyphagia and polyuria.   Musculoskeletal:  Negative for arthralgias, myalgias and neck pain.   Skin:  Negative for rash and wound.   Neurological:  Negative for dizziness, syncope, speech difficulty, weakness, light-headedness and headaches.   Hematological:  Negative for adenopathy. Does not bruise/bleed easily.   Psychiatric/Behavioral:  Negative for confusion, dysphoric mood and sleep disturbance. The patient is not nervous/anxious.    All other systems reviewed and are negative.    /92 (BP Location: Left arm, Patient Position: Sitting, Cuff Size: Adult)   Pulse 116   Ht 190.5 cm (75\")   Wt 97.1 kg (214 lb)   SpO2 97%   BMI 26.75 kg/m²     BP recheck 130/80    Objective  No Known Allergies    Physical Exam  Vitals reviewed.   Constitutional:       Appearance: Normal appearance. He is well-developed.   HENT:      Head: Normocephalic.   Eyes:      Conjunctiva/sclera: Conjunctivae normal.   Neck:      Thyroid: No thyromegaly.      Vascular: No carotid bruit or JVD.   Cardiovascular:      Rate and Rhythm: Normal rate and regular rhythm.   Pulmonary:      Effort: Pulmonary effort is normal.      Breath sounds: Normal breath sounds.   Abdominal:      General: Bowel sounds are normal.      Palpations: Abdomen is soft. There is no hepatomegaly or splenomegaly.      Tenderness: There is no " abdominal tenderness.   Musculoskeletal:      Cervical back: Neck supple.      Right lower leg: No edema.      Left lower leg: No edema.   Skin:     General: Skin is warm and dry.   Neurological:      Mental Status: He is alert and oriented to person, place, and time.   Psychiatric:         Attention and Perception: Attention normal.         Mood and Affect: Mood normal.         Speech: Speech normal.         Behavior: Behavior normal. Behavior is cooperative.         Cognition and Memory: Cognition normal.         LABS  WBC   Date Value Ref Range Status   05/20/2024 5.76 3.40 - 10.80 10*3/mm3 Final     RBC   Date Value Ref Range Status   05/20/2024 5.44 4.14 - 5.80 10*6/mm3 Final     Hemoglobin   Date Value Ref Range Status   05/20/2024 16.4 13.0 - 17.7 g/dL Final     Hematocrit   Date Value Ref Range Status   05/20/2024 46.1 37.5 - 51.0 % Final     MCV   Date Value Ref Range Status   05/20/2024 84.7 79.0 - 97.0 fL Final     MCH   Date Value Ref Range Status   05/20/2024 30.1 26.6 - 33.0 pg Final     MCHC   Date Value Ref Range Status   05/20/2024 35.6 31.5 - 35.7 g/dL Final     RDW   Date Value Ref Range Status   05/20/2024 12.7 12.3 - 15.4 % Final     RDW-SD   Date Value Ref Range Status   05/20/2024 38.7 37.0 - 54.0 fl Final     MPV   Date Value Ref Range Status   05/20/2024 12.2 (H) 6.0 - 12.0 fL Final     Platelets   Date Value Ref Range Status   05/20/2024 242 140 - 450 10*3/mm3 Final       Total Cholesterol   Date Value Ref Range Status   05/20/2024 210 (H) 0 - 200 mg/dL Final     Triglycerides   Date Value Ref Range Status   05/20/2024 318 (H) 0 - 150 mg/dL Final     HDL Cholesterol   Date Value Ref Range Status   05/20/2024 43 40 - 60 mg/dL Final     LDL Cholesterol    Date Value Ref Range Status   05/20/2024 112 (H) 0 - 100 mg/dL Final     LDL Chol Calc (NIH)   Date Value Ref Range Status   10/07/2022 146 (H) 0 - 99 mg/dL Final     IMAGING  CT Angiogram Heart With 3D Image    Result Date: 7/21/2024  No  hemodynamically significant stenosis or occlusion.  This report was finalized on 7/21/2024 11:01 AM by Alyssia Yang M.D..              Assessment & Plan  Diagnoses and all orders for this visit:    1. Tachycardia (Primary)  Stable/controlled on metoprolol.  Patient's heart rate is elevated in clinic today however he reports at home it has improved. Continue to monitor   Recommend avoidance of caffeine/stimulant    2. LV dysfunction  Clinically asymptomatic, continue Toprol, lisinopril and Farxiga  Plan to repeat an echocardiogram    3. Hypercholesterolemia  Continue on statin, heart healthy diet, LDL goal less than 70    4. Essential hypertension  Acceptable in clinic, continue current therapy, recommend routine monitoring    5. Multiple risk factors for coronary artery disease  Aggressive risk factor modification  Continue to monitor with further workup as warranted    6. Type 2 diabetes mellitus with hyperglycemia, without long-term current use of insulin  Recommend tight glycemic control for overall health benefit      Review of medical record    Lifestyle modifications including heart healthy diet, regular exercise, maintenance of desirable body weight and avoidance of tobacco products.    Follow-up in 6 months with EKG, sooner if needed.

## 2024-10-21 NOTE — PROGRESS NOTES
Subjective     David Sanchez is a 44 y.o. male.   Chief Complaint   Patient presents with    Follow-up     History of Present Illness   David Sanchez is a 43-year-old male who presents to clinic today for cardiology follow-up.  He states overall he is feeling better and denies acute complaint.    Longstanding tachycardia, completed a holter monitor with atrial tachycardia and PAC/PVC noted. Heart rate well controlled on Metoprolol. He feels he is doing well on medication and denies side effects.  He states his heart rate has improved.  Had recommended increasing his metoprolol but he has cut back to 25 mg daily which he continues. He denies worsening palpitations, dizziness or syncope. He denies previous diagnosis of arrhythmia.  Recent labs with a normal TSH and CBC.  He does consume some caffeine.     DM currently managed by primary on oral medications and most recent hemoglobin A1c was 7.9.      Hyperlipidemia recently restarted Crestor.  He reports previously being on statin therapy but discontinued due to myalgias.  He has had both an intolerance to Lipitor and Crestor.  He reports doing well overall with Crestor since restarting. Have discussed alternative options for treatment with PCSK9 inhibitors however he defers currently.      Hypertension currently on lisinopril.  He denies home monitoring.  He denies medication side effects and reports compliance with medicine.     He completed a stress test with no ischemia however a decreased LVEF was noted on stress test.  On echocardiogram his LVEF was noted at 46 to 50%.  Discussed further workup of CT imaging versus cardiac cath and he elected for CT imaging. CT imaging without significant stenosis or occlusion. He denies chest discomfort, dyspnea, orthopnea or abdominal/lower extremity swelling.  He recently had a normal BNP.  He remains on lisinopril, Farxiga and beta-blocker therapy.     Patient Active Problem List   Diagnosis    Type 2 diabetes mellitus     Hypercholesterolemia    Essential hypertension    Screening for colon cancer     Past Medical History:   Diagnosis Date    Colon polyp     Disorder associated with type 2 diabetes mellitus     Elevated cholesterol     Hypercholesterolemia     Hypertension     Type 2 diabetes mellitus      Past Surgical History:   Procedure Laterality Date    APPENDECTOMY      COLONOSCOPY N/A 2023    Procedure: COLONOSCOPY;  Surgeon: Satnam Garnett MD;  Location: [facility];  Service: Gastroenterology;  Laterality: N/A;    EYE SURGERY      LASIK       Family History   Problem Relation Age of Onset    Diabetes Father     Diabetes Maternal Grandmother      Social History     Tobacco Use    Smoking status: Former     Current packs/day: 0.00     Average packs/day: 0.3 packs/day for 7.0 years (1.8 ttl pk-yrs)     Types: Cigarettes, Electronic Cigarette     Start date: 2000     Quit date: 2008     Years since quittin.8     Passive exposure: Never    Smokeless tobacco: Never   Vaping Use    Vaping status: Some Days   Substance Use Topics    Alcohol use: Yes     Alcohol/week: 10.0 standard drinks of alcohol     Types: 10 Cans of beer per week     Comment: occ    Drug use: Never     The following portions of the patient's history were reviewed and updated as appropriate: allergies, current medications, past family history, past medical history, past social history, past surgical history and problem list.    No Known Allergies      Current Outpatient Medications:     Cholecalciferol 25 MCG (1000 UT) tablet, Take 1 tablet by mouth Daily., Disp: , Rfl:     dapagliflozin Propanediol (Farxiga) 10 MG tablet, Take 1 tablet by mouth Daily., Disp: 21 tablet, Rfl: 7    famotidine (Pepcid) 20 MG tablet, Take 1 tablet by mouth 2 (Two) Times a Day., Disp: 60 tablet, Rfl: 5    hydrOXYzine (ATARAX) 10 MG tablet, Take 1 tablet by mouth Daily As Needed for Anxiety., Disp: 60 tablet, Rfl: 2    ibuprofen (ADVIL,MOTRIN) 800 MG tablet,  Take 1 tablet by mouth Every 8 (Eight) Hours As Needed for pain., Disp: 20 tablet, Rfl: 1    lisinopril (PRINIVIL,ZESTRIL) 5 MG tablet, Take 1 tablet by mouth Daily., Disp: 21 tablet, Rfl: 7    loratadine (CLARITIN) 10 MG tablet, Take 1 tablet by mouth Daily., Disp: 90 tablet, Rfl: 1    magnesium oxide (MAG-OX) 400 MG tablet, Take 1 tablet by mouth Daily., Disp: , Rfl:     metFORMIN ER (GLUCOPHAGE-XR) 500 MG 24 hr tablet, Take 2 tablets by mouth 2 (Two) Times a Day., Disp: 84 tablet, Rfl: 7    metoprolol succinate XL (TOPROL-XL) 25 MG 24 hr tablet, Take 2 tablets by mouth Daily. (Patient taking differently: Take 1 tablet by mouth Daily.), Disp: 180 tablet, Rfl: 1    OneTouch Verio test strip, Use to test blood sugar once daily (Patient taking differently: Use to test blood sugar once daily), Disp: 100 each, Rfl: 3    sildenafil (Viagra) 50 MG tablet, Take 1/2 tablet by mouth Daily As Needed for Erectile Dysfunction., Disp: 30 tablet, Rfl: 0    SITagliptin (Januvia) 100 MG tablet, Take 1 tablet by mouth Daily., Disp: 90 tablet, Rfl: 1    Thiamine Mononitrate (B1) 100 MG tablet, Take 100 mg by mouth Daily., Disp: , Rfl:     Lancets (OneTouch Delica Plus [id number]) misc, Use to test blood sugar twice daily (Patient not taking: Reported on 10/21/2024), Disp: 100 each, Rfl: 5    rosuvastatin (Crestor) 5 MG tablet, Take 1 tablet by mouth Daily., Disp: 30 tablet, Rfl: 2    Review of Systems   Constitutional:  Negative for activity change, appetite change, chills, diaphoresis, fatigue and fever.   HENT:  Negative for congestion, drooling, ear discharge, ear pain, mouth sores, nosebleeds, postnasal drip, rhinorrhea, sinus pressure, sneezing and sore throat.    Eyes:  Negative for pain, discharge and visual disturbance.   Respiratory:  Negative for cough, chest tightness, shortness of breath and wheezing.    Cardiovascular:  Negative for chest pain, palpitations and leg swelling.   Gastrointestinal:  Negative for abdominal  "pain, constipation, diarrhea, nausea and vomiting.   Endocrine: Negative for cold intolerance, heat intolerance, polydipsia, polyphagia and polyuria.   Musculoskeletal:  Negative for arthralgias, myalgias and neck pain.   Skin:  Negative for rash and wound.   Neurological:  Negative for dizziness, syncope, speech difficulty, weakness, light-headedness and headaches.   Hematological:  Negative for adenopathy. Does not bruise/bleed easily.   Psychiatric/Behavioral:  Negative for confusion, dysphoric mood and sleep disturbance. The patient is not nervous/anxious.    All other systems reviewed and are negative.    /92 (BP Location: Left arm, Patient Position: Sitting, Cuff Size: Adult)   Pulse 116   Ht 190.5 cm (75\")   Wt 97.1 kg (214 lb)   SpO2 97%   BMI 26.75 kg/m²     BP recheck 130/80    Objective   No Known Allergies    Physical Exam  Vitals reviewed.   Constitutional:       Appearance: Normal appearance. He is well-developed.   HENT:      Head: Normocephalic.   Eyes:      Conjunctiva/sclera: Conjunctivae normal.   Neck:      Thyroid: No thyromegaly.      Vascular: No carotid bruit or JVD.   Cardiovascular:      Rate and Rhythm: Normal rate and regular rhythm.   Pulmonary:      Effort: Pulmonary effort is normal.      Breath sounds: Normal breath sounds.   Abdominal:      General: Bowel sounds are normal.      Palpations: Abdomen is soft. There is no hepatomegaly or splenomegaly.      Tenderness: There is no abdominal tenderness.   Musculoskeletal:      Cervical back: Neck supple.      Right lower leg: No edema.      Left lower leg: No edema.   Skin:     General: Skin is warm and dry.   Neurological:      Mental Status: He is alert and oriented to person, place, and time.   Psychiatric:         Attention and Perception: Attention normal.         Mood and Affect: Mood normal.         Speech: Speech normal.         Behavior: Behavior normal. Behavior is cooperative.         Cognition and Memory: Cognition " normal.         LABS  WBC   Date Value Ref Range Status   05/20/2024 5.76 3.40 - 10.80 10*3/mm3 Final     RBC   Date Value Ref Range Status   05/20/2024 5.44 4.14 - 5.80 10*6/mm3 Final     Hemoglobin   Date Value Ref Range Status   05/20/2024 16.4 13.0 - 17.7 g/dL Final     Hematocrit   Date Value Ref Range Status   05/20/2024 46.1 37.5 - 51.0 % Final     MCV   Date Value Ref Range Status   05/20/2024 84.7 79.0 - 97.0 fL Final     MCH   Date Value Ref Range Status   05/20/2024 30.1 26.6 - 33.0 pg Final     MCHC   Date Value Ref Range Status   05/20/2024 35.6 31.5 - 35.7 g/dL Final     RDW   Date Value Ref Range Status   05/20/2024 12.7 12.3 - 15.4 % Final     RDW-SD   Date Value Ref Range Status   05/20/2024 38.7 37.0 - 54.0 fl Final     MPV   Date Value Ref Range Status   05/20/2024 12.2 (H) 6.0 - 12.0 fL Final     Platelets   Date Value Ref Range Status   05/20/2024 242 140 - 450 10*3/mm3 Final       Total Cholesterol   Date Value Ref Range Status   05/20/2024 210 (H) 0 - 200 mg/dL Final     Triglycerides   Date Value Ref Range Status   05/20/2024 318 (H) 0 - 150 mg/dL Final     HDL Cholesterol   Date Value Ref Range Status   05/20/2024 43 40 - 60 mg/dL Final     LDL Cholesterol    Date Value Ref Range Status   05/20/2024 112 (H) 0 - 100 mg/dL Final     LDL Chol Calc (NIH)   Date Value Ref Range Status   10/07/2022 146 (H) 0 - 99 mg/dL Final     IMAGING  CT Angiogram Heart With 3D Image    Result Date: 7/21/2024  No hemodynamically significant stenosis or occlusion.  This report was finalized on 7/21/2024 11:01 AM by Alyssia Yang M.D..              Assessment & Plan   Diagnoses and all orders for this visit:    1. Tachycardia (Primary)  Stable/controlled on metoprolol.  Patient's heart rate is elevated in clinic today however he reports at home it has improved. Continue to monitor   Recommend avoidance of caffeine/stimulant    2. LV dysfunction  Clinically asymptomatic, continue Toprol, lisinopril and  Farxiga  Plan to repeat an echocardiogram    3. Hypercholesterolemia  Continue on statin, heart healthy diet, LDL goal less than 70    4. Essential hypertension  Acceptable in clinic, continue current therapy, recommend routine monitoring    5. Multiple risk factors for coronary artery disease  Aggressive risk factor modification  Continue to monitor with further workup as warranted    6. Type 2 diabetes mellitus with hyperglycemia, without long-term current use of insulin  Recommend tight glycemic control for overall health benefit      Review of medical record    Lifestyle modifications including heart healthy diet, regular exercise, maintenance of desirable body weight and avoidance of tobacco products.    Follow-up in 6 months with EKG, sooner if needed.

## 2024-10-30 ENCOUNTER — SPECIALTY PHARMACY (OUTPATIENT)
Dept: PHARMACY | Facility: HOSPITAL | Age: 44
End: 2024-10-30
Payer: COMMERCIAL

## 2024-10-30 DIAGNOSIS — E78.2 MIXED HYPERLIPIDEMIA: ICD-10-CM

## 2024-10-30 RX ORDER — ROSUVASTATIN CALCIUM 5 MG/1
5 TABLET, COATED ORAL DAILY
Qty: 30 TABLET | Refills: 2 | Status: SHIPPED | OUTPATIENT
Start: 2024-10-30

## 2024-10-30 NOTE — PROGRESS NOTES
Specialty Pharmacy Refill Coordination Note     David is a 44 y.o. male contacted today regarding refills of  Januvia, metformin, farxiga specialty medication(s).    Reviewed and verified with patient:       Specialty medication(s) and dose(s) confirmed: yes    Refill Questions      Flowsheet Row Most Recent Value   Changes to allergies? No   Changes to medications? No   New conditions or infections since last clinic visit No   Unplanned office visit, urgent care, ED, or hospital admission in the last 4 weeks  No   How does patient/caregiver feel medication is working? Very good   Financial problems or insurance changes  No   Since the previous refill, were any specialty medication doses or scheduled injections missed or delayed?  No   Why were doses missed? na   Does this patient require a clinical escalation to a pharmacist? No            Delivery Questions      Flowsheet Row Most Recent Value   Delivery method [employer]   Delivery address verified with patient/caregiver? Yes   Delivery address Home   Number of medications in delivery 9   Medication(s) being filled and delivered Dapagliflozin Propanediol, SITagliptin Phosphate (JANUVIA), metFORMIN HCl (GLUCOPHAGE-XR)   Doses left of specialty medications na   Copay verified? No   Copay amount na   Copay form of payment No copayment ($0)   Ship Date 10/31   Delivery Date 11/01   Signature Required No                   Follow-up: 30 day(s)     Alda Arnett, Pharmacy Technician  Specialty Pharmacy Technician

## 2024-11-15 ENCOUNTER — SPECIALTY PHARMACY (OUTPATIENT)
Dept: PHARMACY | Facility: HOSPITAL | Age: 44
End: 2024-11-15
Payer: COMMERCIAL

## 2024-11-15 NOTE — PROGRESS NOTES
Specialty Pharmacy Refill Coordination Note     David is a 44 y.o. male contacted today regarding refills of  Farxiga, Metoprolol, pepcid, hydroxyzine, januvia, claritin, metformin, crestor specialty medication(s).    Reviewed and verified with patient:       Specialty medication(s) and dose(s) confirmed: yes    Refill Questions      Flowsheet Row Most Recent Value   Changes to allergies? No   Changes to medications? No   New conditions or infections since last clinic visit No   Unplanned office visit, urgent care, ED, or hospital admission in the last 4 weeks  No   How does patient/caregiver feel medication is working? Very good   Financial problems or insurance changes  No   Since the previous refill, were any specialty medication doses or scheduled injections missed or delayed?  No   Why were doses missed? na   Does this patient require a clinical escalation to a pharmacist? No            Delivery Questions      Flowsheet Row Most Recent Value   Delivery method UPS   Delivery address verified with patient/caregiver? Yes   Delivery address Home   Number of medications in delivery 8   Medication(s) being filled and delivered hydrOXYzine HCl (ATARAX), Dapagliflozin Propanediol, Famotidine (PEPCID), metFORMIN HCl (GLUCOPHAGE-XR), Loratadine (CLARITIN), Metoprolol Succinate (TOPROL-XL), SITagliptin Phosphate (JANUVIA), Rosuvastatin Calcium (CRESTOR)   Doses left of specialty medications na   Copay verified? Yes   Copay amount 33.50   Copay form of payment Credit/debit on file   Ship Date 11/18   Delivery Date 11/19   Signature Required No                   Follow-up: 30 day(s)     Alda Arnett, Pharmacy Technician  Specialty Pharmacy Technician

## 2024-12-05 ENCOUNTER — SPECIALTY PHARMACY (OUTPATIENT)
Dept: PHARMACY | Facility: HOSPITAL | Age: 44
End: 2024-12-05
Payer: COMMERCIAL

## 2024-12-05 NOTE — PROGRESS NOTES
Specialty Pharmacy Refill Coordination Note     David is a 44 y.o. male contacted today regarding refills of  Pepcid, Farxiga, Hydroxyzine, Januvia, Clairitin, metformin, meoprolol, crestor specialty medication(s).    Reviewed and verified with patient:       Specialty medication(s) and dose(s) confirmed: yes    Refill Questions      Flowsheet Row Most Recent Value   Changes to allergies? No   Changes to medications? No   New conditions or infections since last clinic visit No   Unplanned office visit, urgent care, ED, or hospital admission in the last 4 weeks  No   How does patient/caregiver feel medication is working? Very good   Financial problems or insurance changes  No   If yes, describe changes in insurance or financial issues. na   Since the previous refill, were any specialty medication doses or scheduled injections missed or delayed?  No   If yes, please provide the amount na   Why were doses missed? na   Does this patient require a clinical escalation to a pharmacist? No            Delivery Questions      Flowsheet Row Most Recent Value   Delivery method UPS   Delivery address verified with patient/caregiver? Yes   Delivery address Home   Number of medications in delivery 8   Medication(s) being filled and delivered hydrOXYzine HCl (ATARAX), Dapagliflozin Propanediol, Famotidine (PEPCID), metFORMIN HCl (GLUCOPHAGE-XR), Loratadine (CLARITIN), Metoprolol Succinate (TOPROL-XL), SITagliptin Phosphate (JANUVIA), Rosuvastatin Calcium (CRESTOR)   Doses left of specialty medications na   Copay verified? Yes   Copay amount 33.50$   Copay form of payment Credit/debit on file   Ship Date 12/09   Delivery Date 12/10   Signature Required No                   Follow-up: 21 day(s)     Alda Arnett Pharmacy Technician  Specialty Pharmacy Technician

## 2024-12-09 DIAGNOSIS — M19.09 PRIMARY OSTEOARTHRITIS OF OTHER SITE: ICD-10-CM

## 2024-12-09 RX ORDER — IBUPROFEN 800 MG/1
800 TABLET, FILM COATED ORAL EVERY 8 HOURS PRN
Qty: 20 TABLET | Refills: 1 | Status: SHIPPED | OUTPATIENT
Start: 2024-12-09

## 2024-12-20 ENCOUNTER — HOSPITAL ENCOUNTER (OUTPATIENT)
Dept: CARDIOLOGY | Facility: HOSPITAL | Age: 44
Discharge: HOME | End: 2024-12-20
Admitting: NURSE PRACTITIONER
Payer: COMMERCIAL

## 2024-12-20 ENCOUNTER — SPECIALTY PHARMACY (OUTPATIENT)
Dept: PHARMACY | Facility: HOSPITAL | Age: 44
End: 2024-12-20
Payer: COMMERCIAL

## 2024-12-20 DIAGNOSIS — I51.9 LV DYSFUNCTION: ICD-10-CM

## 2024-12-20 LAB
BH CV ECHO MEAS - AO MAX PG: 5.1 MMHG
BH CV ECHO MEAS - AO MEAN PG: 4 MMHG
BH CV ECHO MEAS - AO ROOT DIAM: 3.4 CM
BH CV ECHO MEAS - AO V2 MAX: 113 CM/SEC
BH CV ECHO MEAS - AO V2 VTI: 19.9 CM
BH CV ECHO MEAS - EDV(CUBED): 91.1 ML
BH CV ECHO MEAS - EDV(MOD-SP4): 79.2 ML
BH CV ECHO MEAS - EF(MOD-SP4): 62.2 %
BH CV ECHO MEAS - ESV(CUBED): 39.3 ML
BH CV ECHO MEAS - ESV(MOD-SP4): 29.9 ML
BH CV ECHO MEAS - FS: 24.4 %
BH CV ECHO MEAS - IVS/LVPW: 0.88 CM
BH CV ECHO MEAS - IVSD: 1.2 CM
BH CV ECHO MEAS - LA DIMENSION: 3.1 CM
BH CV ECHO MEAS - LAT PEAK E' VEL: 8.3 CM/SEC
BH CV ECHO MEAS - LV DIASTOLIC VOL/BSA (35-75): 35.1 CM2
BH CV ECHO MEAS - LV MASS(C)D: 275.8 GRAMS
BH CV ECHO MEAS - LV SYSTOLIC VOL/BSA (12-30): 13.2 CM2
BH CV ECHO MEAS - LVIDD: 4.5 CM
BH CV ECHO MEAS - LVIDS: 3.4 CM
BH CV ECHO MEAS - LVOT AREA: 2.8 CM2
BH CV ECHO MEAS - LVOT DIAM: 1.9 CM
BH CV ECHO MEAS - LVPWD: 1.3 CM
BH CV ECHO MEAS - MED PEAK E' VEL: 6 CM/SEC
BH CV ECHO MEAS - MV A MAX VEL: 89.2 CM/SEC
BH CV ECHO MEAS - MV E MAX VEL: 78.2 CM/SEC
BH CV ECHO MEAS - MV E/A: 0.88
BH CV ECHO MEAS - PA ACC TIME: 0.07 SEC
BH CV ECHO MEAS - SV(MOD-SP4): 49.3 ML
BH CV ECHO MEAS - SVI(MOD-SP4): 21.8 ML/M2
BH CV ECHO MEAS - TAPSE (>1.6): 1.93 CM
BH CV ECHO MEASUREMENTS AVERAGE E/E' RATIO: 10.94
LEFT ATRIUM VOLUME INDEX: 15 ML/M2
LV EF 2D ECHO EST: 60 %

## 2024-12-20 PROCEDURE — 93306 TTE W/DOPPLER COMPLETE: CPT

## 2024-12-20 NOTE — PROGRESS NOTES
Specialty Pharmacy Refill Coordination Note     David is a 44 y.o. male contacted today regarding refills of  Farxiga, Januvia, Metformin, Metoprolol, Crestor, Ibuprofen specialty medication(s).    Reviewed and verified with patient:       Specialty medication(s) and dose(s) confirmed: yes    Refill Questions      Flowsheet Row Most Recent Value   Changes to allergies? No   Changes to medications? No   New conditions or infections since last clinic visit No   Unplanned office visit, urgent care, ED, or hospital admission in the last 4 weeks  No   How does patient/caregiver feel medication is working? Very good   Financial problems or insurance changes  No   If yes, describe changes in insurance or financial issues. na   Since the previous refill, were any specialty medication doses or scheduled injections missed or delayed?  No   If yes, please provide the amount na   Why were doses missed? na   Does this patient require a clinical escalation to a pharmacist? No            Delivery Questions      Flowsheet Row Most Recent Value   Delivery method UPS   Delivery address verified with patient/caregiver? Yes   Delivery address Home   Number of medications in delivery 9   Medication(s) being filled and delivered hydrOXYzine HCl (ATARAX), Dapagliflozin Propanediol, Famotidine (PEPCID), Rosuvastatin Calcium (CRESTOR), metFORMIN HCl (GLUCOPHAGE-XR), Loratadine (CLARITIN), Metoprolol Succinate (TOPROL-XL), SITagliptin Phosphate (JANUVIA), Ibuprofen (ADVIL,MOTRIN)   Doses left of specialty medications na   Copay verified? Yes   Copay amount 30.61   Copay form of payment Credit/debit on file   Ship Date 12/23   Delivery Date 12/24   Signature Required No                   Follow-up: 21 day(s)     Alda Arnett, Pharmacy Technician  Specialty Pharmacy Technician

## 2024-12-27 ENCOUNTER — TELEPHONE (OUTPATIENT)
Dept: CARDIOLOGY | Facility: CLINIC | Age: 44
End: 2024-12-27
Payer: COMMERCIAL

## 2024-12-27 NOTE — TELEPHONE ENCOUNTER
Tried to call pt, no answer. Left vm    ----- Message from Shirin Grigsby sent at 12/23/2024  4:07 PM EST -----  Echocardiogram with mild concentric hypertrophy (ensure optimum BP control) LVEF around 60%, no pericardial effusion noted     Continue with current plan of care   Shortness of breath

## 2025-01-09 DIAGNOSIS — J30.2 SEASONAL ALLERGIES: ICD-10-CM

## 2025-01-09 DIAGNOSIS — E78.2 MIXED HYPERLIPIDEMIA: ICD-10-CM

## 2025-01-09 DIAGNOSIS — F41.1 GENERALIZED ANXIETY DISORDER: ICD-10-CM

## 2025-01-09 RX ORDER — LORATADINE 10 MG/1
10 TABLET ORAL DAILY
Qty: 90 TABLET | Refills: 1 | Status: CANCELLED | OUTPATIENT
Start: 2025-01-09

## 2025-01-09 RX ORDER — ROSUVASTATIN CALCIUM 5 MG/1
5 TABLET, COATED ORAL DAILY
Qty: 30 TABLET | Refills: 2 | Status: CANCELLED | OUTPATIENT
Start: 2025-01-09

## 2025-01-09 RX ORDER — METOPROLOL SUCCINATE 25 MG/1
50 TABLET, EXTENDED RELEASE ORAL DAILY
Qty: 180 TABLET | Refills: 1 | Status: SHIPPED | OUTPATIENT
Start: 2025-01-09

## 2025-01-09 RX ORDER — HYDROXYZINE HYDROCHLORIDE 10 MG/1
10 TABLET, FILM COATED ORAL DAILY PRN
Qty: 60 TABLET | Refills: 2 | Status: CANCELLED | OUTPATIENT
Start: 2025-01-09

## 2025-01-15 ENCOUNTER — SPECIALTY PHARMACY (OUTPATIENT)
Dept: PHARMACY | Facility: HOSPITAL | Age: 45
End: 2025-01-15
Payer: COMMERCIAL

## 2025-01-15 NOTE — PROGRESS NOTES
Specialty Pharmacy Refill Coordination Note     David is a 44 y.o. male contacted today regarding refills of  Januvia specialty medication(s).    Reviewed and verified with patient:       Specialty medication(s) and dose(s) confirmed: yes    Refill Questions      Flowsheet Row Most Recent Value   Changes to allergies? No   Changes to medications? No   New conditions or infections since last clinic visit No   Unplanned office visit, urgent care, ED, or hospital admission in the last 4 weeks  No   How does patient/caregiver feel medication is working? Very good   Financial problems or insurance changes  No   If yes, describe changes in insurance or financial issues. na   Since the previous refill, were any specialty medication doses or scheduled injections missed or delayed?  No   If yes, please provide the amount na   Why were doses missed? na   Does this patient require a clinical escalation to a pharmacist? No            Delivery Questions      Flowsheet Row Most Recent Value   Delivery method  at Pharmacy   Number of medications in delivery 3   Medication(s) being filled and delivered Dapagliflozin Propanediol, metFORMIN HCl (GLUCOPHAGE-XR), SITagliptin Phosphate (JANUVIA)   Doses left of specialty medications na   Copay verified? Yes   Copay amount 5.00$   Copay form of payment Pay at pickup   Signature Required Yes                   Follow-up: 30 day(s)     Alda Arnett, Pharmacy Technician  Specialty Pharmacy Technician

## 2025-01-30 DIAGNOSIS — E11.65 TYPE 2 DIABETES MELLITUS WITH HYPERGLYCEMIA, WITHOUT LONG-TERM CURRENT USE OF INSULIN: Chronic | ICD-10-CM

## 2025-01-31 RX ORDER — DAPAGLIFLOZIN 10 MG/1
10 TABLET, FILM COATED ORAL DAILY
Qty: 21 TABLET | Refills: 7 | Status: SHIPPED | OUTPATIENT
Start: 2025-01-31

## 2025-01-31 RX ORDER — METFORMIN HYDROCHLORIDE 500 MG/1
1000 TABLET, EXTENDED RELEASE ORAL 2 TIMES DAILY
Qty: 84 TABLET | Refills: 7 | Status: SHIPPED | OUTPATIENT
Start: 2025-01-31

## 2025-02-05 ENCOUNTER — SPECIALTY PHARMACY (OUTPATIENT)
Dept: PHARMACY | Facility: HOSPITAL | Age: 45
End: 2025-02-05
Payer: COMMERCIAL

## 2025-02-05 NOTE — PROGRESS NOTES
Specialty Pharmacy Refill Coordination Note     David is a 44 y.o. male contacted today regarding refills of  Metformin and Farxiga specialty medication(s).    Reviewed and verified with patient:       Specialty medication(s) and dose(s) confirmed: yes    Refill Questions      Flowsheet Row Most Recent Value   Changes to allergies? No   Changes to medications? No   New conditions or infections since last clinic visit No   Unplanned office visit, urgent care, ED, or hospital admission in the last 4 weeks  No   How does patient/caregiver feel medication is working? Very good   Financial problems or insurance changes  No   If yes, describe changes in insurance or financial issues. na   Since the previous refill, were any specialty medication doses or scheduled injections missed or delayed?  No   If yes, please provide the amount na   Why were doses missed? na   Does this patient require a clinical escalation to a pharmacist? No            Delivery Questions      Flowsheet Row Most Recent Value   Delivery method  at Pharmacy   Delivery address verified with patient/caregiver? Yes   Delivery address Home   Number of medications in delivery 2   Medication(s) being filled and delivered Dapagliflozin Propanediol, metFORMIN HCl (GLUCOPHAGE-XR)   Doses left of specialty medications na   Copay verified? Yes   Copay amount 2.56$   Copay form of payment Pay at pickup   Signature Required Yes                   Follow-up: 30 day(s)     Alda Arnett Pharmacy Technician  Specialty Pharmacy Technician

## 2025-02-07 ENCOUNTER — SPECIALTY PHARMACY (OUTPATIENT)
Dept: PHARMACY | Facility: HOSPITAL | Age: 45
End: 2025-02-07
Payer: COMMERCIAL

## 2025-02-07 DIAGNOSIS — E11.65 TYPE 2 DIABETES MELLITUS WITH HYPERGLYCEMIA, WITHOUT LONG-TERM CURRENT USE OF INSULIN: Primary | Chronic | ICD-10-CM

## 2025-02-07 RX ORDER — ASPIRIN 81 MG/1
81 TABLET ORAL DAILY
COMMUNITY

## 2025-02-07 RX ORDER — METFORMIN HYDROCHLORIDE 500 MG/1
1000 TABLET, EXTENDED RELEASE ORAL 2 TIMES DAILY
Qty: 84 TABLET | Refills: 7 | Status: SHIPPED | OUTPATIENT
Start: 2025-02-07

## 2025-02-07 RX ORDER — DAPAGLIFLOZIN 10 MG/1
10 TABLET, FILM COATED ORAL DAILY
Qty: 21 TABLET | Refills: 7 | Status: SHIPPED | OUTPATIENT
Start: 2025-02-07

## 2025-02-07 NOTE — PROGRESS NOTES
Specialty Pharmacy Patient Management Program  Endocrinology Reassessment     David Sanchez is a 44 y.o. male with Type 2 Diabetes seen by an Endocrinology Provider and enrolled in the Endocrinology Patient Management program offered by [facility]. A follow-up was conducted, including assessment of continued therapy appropriateness, medication adherence, and side effect incidence and management for Metformin, Januvia, and Farxiga.     Patient is taking metformin ER 1,000mg po BID, Januvia 100 mg daily and Farxiga 10 mg daily to control BG. He reports no side effects and no adherence issues at this time.  The pt states that he checks his BG once daily and it is normally around 110.  The pt denies any recent low BG < 70 episodes.       The pt denies any personal or family history of thyroid cancer, denies any issues with pancreatitis, and denies any recurrent UTI's or yeast infections.    Changes to Insurance Coverage or Financial Support  None     Relevant Past Medical History and Comorbidities  Relevant medical history and concomitant health conditions were discussed with the patient. The patient's chart has been reviewed for relevant past medical history and comorbid health conditions and updated as necessary.   Past Medical History:   Diagnosis Date    Colon polyp     Disorder associated with type 2 diabetes mellitus     Elevated cholesterol     Hypercholesterolemia     Hypertension     Type 2 diabetes mellitus 2017     Social History     Socioeconomic History    Marital status:    Tobacco Use    Smoking status: Former     Current packs/day: 0.00     Average packs/day: 0.3 packs/day for 7.0 years (1.8 ttl pk-yrs)     Types: Cigarettes, Electronic Cigarette     Start date: 2000     Quit date: 2008     Years since quittin.1     Passive exposure: Never    Smokeless tobacco: Never   Vaping Use    Vaping status: Some Days   Substance and Sexual Activity    Alcohol use:  Yes     Alcohol/week: 10.0 standard drinks of alcohol     Types: 10 Cans of beer per week     Comment: occ    Drug use: Never    Sexual activity: Yes     Partners: Female     Birth control/protection: I.U.D.       Problem list reviewed by Sahil Suh RPH on 2/7/2025 at  5:02 PM    Allergies  Known allergies and reactions were discussed with the patient. The patient's chart has been reviewed for allergy information and updated as necessary.   Patient has no known allergies.    Allergies reviewed by Sahil Suh RPH on 2/7/2025 at  5:02 PM    Relevant Laboratory Values  A1C Last 3 Results          8/16/2024    08:26   HGBA1C Last 3 Results   Hemoglobin A1C 6.4      Lab Results   Component Value Date    HGBA1C 6.4 (A) 08/16/2024     Lab Results   Component Value Date    GLUCOSE 215 (H) 06/14/2024    CALCIUM 9.4 06/14/2024     06/14/2024    K 4.2 06/14/2024    CO2 23.3 06/14/2024     06/14/2024    BUN 18 06/14/2024    CREATININE 1.03 06/14/2024    BCR 17.5 06/14/2024    ANIONGAP 13.7 06/14/2024     Lab Results   Component Value Date    CHOL 210 (H) 05/20/2024    CHLPL 219 (H) 10/07/2022    TRIG 318 (H) 05/20/2024    HDL 43 05/20/2024     (H) 05/20/2024     Microalbumin          8/16/2024    08:42   Microalbumin   Microalbumin, Urine 1.4      Current Medication List  This medication list has been reviewed with the patient and evaluated for any interactions or necessary modifications/recommendations, and updated to include all prescription medications, OTC medications, and supplements the patient is currently taking.  This list reflects what is contained in the patient's profile, which has also been marked as reviewed to communicate to other providers it is the most up to date version of the patient's current medication therapy.     Current Outpatient Medications:     Cholecalciferol 25 MCG (1000 UT) tablet, Take 1 tablet by mouth Daily., Disp: , Rfl:     dapagliflozin Propanediol (Farxiga) 10 MG  tablet, Take 1 tablet by mouth Daily., Disp: 21 tablet, Rfl: 7    famotidine (Pepcid) 20 MG tablet, Take 1 tablet by mouth 2 (Two) Times a Day., Disp: 60 tablet, Rfl: 5    hydrOXYzine (ATARAX) 10 MG tablet, Take 1 tablet by mouth Daily As Needed for Anxiety., Disp: 60 tablet, Rfl: 2    ibuprofen (ADVIL,MOTRIN) 800 MG tablet, Take 1 tablet by mouth Every 8 (Eight) Hours As Needed for pain., Disp: 20 tablet, Rfl: 1    Lancets (OneTouch Delica Plus Iucdwf43N) misc, Use to test blood sugar twice daily, Disp: 100 each, Rfl: 5    lisinopril (PRINIVIL,ZESTRIL) 5 MG tablet, Take 1 tablet by mouth Daily., Disp: 21 tablet, Rfl: 7    loratadine (CLARITIN) 10 MG tablet, Take 1 tablet by mouth Daily., Disp: 90 tablet, Rfl: 1    magnesium oxide (MAG-OX) 400 MG tablet, Take 1 tablet by mouth Daily., Disp: , Rfl:     metFORMIN ER (GLUCOPHAGE-XR) 500 MG 24 hr tablet, Take 2 tablets by mouth 2 (Two) Times a Day., Disp: 84 tablet, Rfl: 7    metoprolol succinate XL (TOPROL-XL) 25 MG 24 hr tablet, Take 2 tablets by mouth Daily., Disp: 180 tablet, Rfl: 1    OneTouch Verio test strip, Use to test blood sugar once daily, Disp: 100 each, Rfl: 3    rosuvastatin (Crestor) 5 MG tablet, Take 1 tablet by mouth Daily., Disp: 30 tablet, Rfl: 2    sildenafil (Viagra) 50 MG tablet, Take 1/2 tablet by mouth Daily As Needed for Erectile Dysfunction., Disp: 30 tablet, Rfl: 0    SITagliptin (Januvia) 100 MG tablet, Take 1 tablet by mouth Daily., Disp: 90 tablet, Rfl: 1    Thiamine Mononitrate (B1) 100 MG tablet, Take 100 mg by mouth Daily., Disp: , Rfl:     Medicines reviewed by Sahil Suh [facility] on 2/7/2025 at  5:01 PM    Drug Interactions with diabetic medications  Coadministration of metformin and IBU may increase the risk of acute renal failure and lactic acidosis - provider will continue to routinely monitor kidney function (he only uses IBU PRN)  Lisinopril may enhance the adverse/toxic effect of metformin. This includes both a risk for  hypoglycemia and for lactic acidosis.  Metoprolol may mask the hypoglycemic symptoms of metformin, farxiga, and januvia.  Aspirin may enhance the hypoglycemic effect of antidiabetic agents  Januvia may enhance the adverse effects of Lisinopril, specifically the risk of angioedema may be increased. Pt denies any signs/symptoms of angioedema at this time.  Educated pt on this potential side effect.    Adverse Drug Reactions  Adverse Reactions Experienced: None  Plan for ADR Management: Not Required    Hospitalizations and Urgent Care Since Last Assessment  Hospitalizations or Admissions: None  ED Visits: None  Urgent Office Visits: None    Adherence and Self-Administration  Adherence related patient's specialty therapy was discussed with the patient. The Adherence segment of this outreach has been reviewed and updated.     Ongoing or New Barriers to Patient Adherence and/or Self-Administration: None   Methods for Supporting Patient Adherence and/or Self-Administration: None Required     Goals of Therapy  Goals related to the patient's specialty therapy was discussed with the patient. The Patient Goals segment of this outreach has been reviewed and updated.    Goals Addressed Today        Consistently take medications as prescribed      Pt reports no missed doses of his diabetes meds.       HEMOGLOBIN A1C < 7       Most recent A1c 6.4%       Specialty Pharmacy General Goal      Prevent hypoglcyemia--Pt reports no BG < 70 episodes            Reassessment Plan & Follow-Up  Patient's diabetes is controlled with A1C of 6.4%.   Recent Provider Medication Therapy Changes: Per Nereida BARNARD will order refills for:  Januvia 100 mg QD  Metformin ER 1,000 mg BID   Farxiga 10 mg QD  Additional Plans, Therapy Recommendations, or Therapy Problems to Be Addressed:   Recommend that provider check lipid panel and CMP with upcoming visit to assess renal function, liver enzymes (pt is on statin and had elevated ALT with last CMP  on 5/20/24), and lipids (last lipid panel was abnormal on 5/20/24 and pt was started on crestor 5mg daily by PCP).  Will send updated RX's to [facility] for pickup.  Pharmacist to perform regular reassessments no more than (6) months from the previous assessment.  Care Coordinator to set up future refill outreaches, coordinate prescription delivery, and escalate clinical questions to pharmacist.     Attestation  I attest the patient was actively involved in and has agreed to the above plan of care. If the prescribed therapy is at any point deemed not appropriate based on the current or future assessments, a consultation will be initiated with the patient's specialty care provider to determine the best course of action. The revised plan of therapy will be documented along with any required assessments and/or additional patient education provided.     Sahil Suh RPH  02/07/25  17:04 EST

## 2025-02-10 ENCOUNTER — SPECIALTY PHARMACY (OUTPATIENT)
Dept: PHARMACY | Facility: HOSPITAL | Age: 45
End: 2025-02-10
Payer: COMMERCIAL

## 2025-02-10 DIAGNOSIS — F41.1 GENERALIZED ANXIETY DISORDER: ICD-10-CM

## 2025-02-10 DIAGNOSIS — J30.2 SEASONAL ALLERGIES: ICD-10-CM

## 2025-02-10 DIAGNOSIS — E78.2 MIXED HYPERLIPIDEMIA: ICD-10-CM

## 2025-02-10 RX ORDER — LORATADINE 10 MG/1
10 TABLET ORAL DAILY
Qty: 90 TABLET | Refills: 0 | Status: SHIPPED | OUTPATIENT
Start: 2025-02-10

## 2025-02-10 RX ORDER — HYDROXYZINE HYDROCHLORIDE 10 MG/1
10 TABLET, FILM COATED ORAL DAILY PRN
Qty: 60 TABLET | Refills: 0 | Status: SHIPPED | OUTPATIENT
Start: 2025-02-10

## 2025-02-10 RX ORDER — ROSUVASTATIN CALCIUM 5 MG/1
5 TABLET, COATED ORAL DAILY
Qty: 90 TABLET | Refills: 0 | Status: SHIPPED | OUTPATIENT
Start: 2025-02-10

## 2025-02-10 NOTE — PROGRESS NOTES
Specialty Pharmacy Patient Management Program  Medication Management Clinic Refill Outreach      David was contacted today regarding refills of his medication(s).    Specialty medication(s) and dose(s) confirmed: Famotidine, hydroxyzine, januvia, loratadine, metoprolol, crestor    Refill Questions      Flowsheet Row Most Recent Value   Changes to allergies? No   Changes to medications? No   New conditions or infections since last clinic visit No   Unplanned office visit, urgent care, ED, or hospital admission in the last 4 weeks  No   How does patient/caregiver feel medication is working? Very good   Financial problems or insurance changes  No   Since the previous refill, were any specialty medication doses or scheduled injections missed or delayed?  No   Does this patient require a clinical escalation to a pharmacist? No          Delivery Questions      Flowsheet Row Most Recent Value   Delivery method  at Pharmacy   Delivery address verified with patient/caregiver? Yes   Delivery address Prescription   Number of medications in delivery 6   Medication(s) being filled and delivered hydrOXYzine HCl (ATARAX), Loratadine (CLARITIN), Metoprolol Succinate (TOPROL-XL), SITagliptin Phosphate (JANUVIA), Famotidine (PEPCID), Rosuvastatin Calcium (CRESTOR)   Copay verified? Yes   Copay amount $39.05   Copay form of payment Pay at pickup   Signature Required Yes            Follow-Up: Pt has next f/u appt on 2/18/25.    Sahil Suh RPH  2/10/2025  09:22 EST

## 2025-02-10 NOTE — PROGRESS NOTES
Specialty Pharmacy Refill Coordination Note     David is a 44 y.o. male contacted today regarding refills of  Januvia specialty medication(s).    Reviewed and verified with patient:       Specialty medication(s) and dose(s) confirmed: yes    Refill Questions      Flowsheet Row Most Recent Value   Changes to allergies? No   Changes to medications? No   New conditions or infections since last clinic visit No   Unplanned office visit, urgent care, ED, or hospital admission in the last 4 weeks  No   How does patient/caregiver feel medication is working? Very good   Financial problems or insurance changes  No   If yes, describe changes in insurance or financial issues. na   Since the previous refill, were any specialty medication doses or scheduled injections missed or delayed?  No   If yes, please provide the amount na   Why were doses missed? na   Does this patient require a clinical escalation to a pharmacist? No            Delivery Questions      Flowsheet Row Most Recent Value   Delivery method  at Pharmacy   Number of medications in delivery 6   Medication(s) being filled and delivered hydrOXYzine HCl (ATARAX), Loratadine (CLARITIN), Metoprolol Succinate (TOPROL-XL), Rosuvastatin Calcium (CRESTOR), Famotidine (PEPCID), SITagliptin Phosphate (JANUVIA)   Doses left of specialty medications na   Copay verified? Yes   Copay amount 39.05$   Copay form of payment Pay at pickup   Signature Required Yes                   Follow-up: 30 day(s)     Alda Arnett Pharmacy Technician  Specialty Pharmacy Technician

## 2025-02-17 ENCOUNTER — OFFICE VISIT (OUTPATIENT)
Dept: ENDOCRINOLOGY | Facility: CLINIC | Age: 45
End: 2025-02-17
Payer: COMMERCIAL

## 2025-02-17 ENCOUNTER — SPECIALTY PHARMACY (OUTPATIENT)
Dept: PHARMACY | Facility: HOSPITAL | Age: 45
End: 2025-02-17
Payer: COMMERCIAL

## 2025-02-17 VITALS
BODY MASS INDEX: 26.12 KG/M2 | OXYGEN SATURATION: 97 % | WEIGHT: 209 LBS | HEART RATE: 91 BPM | SYSTOLIC BLOOD PRESSURE: 134 MMHG | DIASTOLIC BLOOD PRESSURE: 91 MMHG

## 2025-02-17 DIAGNOSIS — E11.65 TYPE 2 DIABETES MELLITUS WITH HYPERGLYCEMIA, WITHOUT LONG-TERM CURRENT USE OF INSULIN: Primary | ICD-10-CM

## 2025-02-17 DIAGNOSIS — E11.9 TYPE 2 DIABETES MELLITUS WITHOUT COMPLICATION, WITHOUT LONG-TERM CURRENT USE OF INSULIN: Primary | Chronic | ICD-10-CM

## 2025-02-17 LAB
EXPIRATION DATE: ABNORMAL
EXPIRATION DATE: ABNORMAL
GLUCOSE BLDC GLUCOMTR-MCNC: 187 MG/DL (ref 70–130)
HBA1C MFR BLD: 8.1 % (ref 4.5–5.7)
Lab: ABNORMAL
Lab: ABNORMAL

## 2025-02-17 PROCEDURE — 99214 OFFICE O/P EST MOD 30 MIN: CPT | Performed by: NURSE PRACTITIONER

## 2025-02-17 PROCEDURE — 82947 ASSAY GLUCOSE BLOOD QUANT: CPT | Performed by: NURSE PRACTITIONER

## 2025-02-17 PROCEDURE — 83036 HEMOGLOBIN GLYCOSYLATED A1C: CPT | Performed by: NURSE PRACTITIONER

## 2025-02-17 RX ORDER — BLOOD-GLUCOSE METER
EACH MISCELLANEOUS
Qty: 1 EACH | Refills: 0 | Status: SHIPPED | OUTPATIENT
Start: 2025-02-17

## 2025-02-17 RX ORDER — LANCETS
EACH MISCELLANEOUS
Qty: 100 EACH | Refills: 5 | Status: SHIPPED | OUTPATIENT
Start: 2025-02-17

## 2025-02-17 NOTE — ASSESSMENT & PLAN NOTE
-Diabetes is above goal with A1c up from 6.4 to 8.1.  -Discussed dietary and exercise guidelines.  -Discussed importance of yearly eye exams.  -Discussed importance of maintaining optimal blood sugars.  -Continue metformin 1000 mg twice daily.  -Continue Farxiga 10 mg daily. Discussed the medication's MOA and that it may cause more frequent urination particularly upon starting the medication. Take one tablet in the morning with or without food. Encouraged to drink plenty of water as to not get dehydrated especially in warmer weather or with activity. Also discussed there may be an increased incidence of UTIs or yeast infections and to monitor for signs/symptoms.  -Discontinue Januvia.  -Start Mounjaro 2.5 mg weekly.  Patient has no personal history of pancreatitis, no family history of MEN syndrome or medullary thyroid cancer. Possible side effects including nausea, bloating, other GI upset and rarely pancreatitis were discussed. He was advised to call the office with any symptoms or concerns.   -Signs and symptoms of hypoglycemia reviewed with rule 15's advised.  -Follow-up in 3 months.

## 2025-02-17 NOTE — PROGRESS NOTES
Specialty Pharmacy Patient Management Program  Endocrinology Reassessment     David Sanchez is a 44 y.o. male with Type 2 Diabetes seen by an Endocrinology Provider and enrolled in the Endocrinology Patient Management program offered by [facility]. A follow-up was conducted, including assessment of continued therapy appropriateness, medication adherence, and side effect incidence and management for Metformin, Januvia, and Farxiga.     Patient is taking metformin ER 1,000mg po BID, Januvia 100 mg daily and Farxiga 10 mg daily to control BG. He reports no side effects and no adherence issues at this time.  The pt states that he previously checked his BG once daily and it was normally around 110, however he reports being out of testing supplies at the moment so he hasn't been checking his BG at this time.  The pt denies any recent low BG < 70 episodes.       Of note, pt states today that the ate worse throughout the holidays.  Pt is inquiring today about starting a GLP-1 agonist.    The pt denies any personal or family history of thyroid cancer, denies any issues with pancreatitis, and denies any recurrent UTI's or yeast infections.    Changes to Insurance Coverage or Financial Support  None     Relevant Past Medical History and Comorbidities  Relevant medical history and concomitant health conditions were discussed with the patient. The patient's chart has been reviewed for relevant past medical history and comorbid health conditions and updated as necessary.   Past Medical History:   Diagnosis Date    Colon polyp     Disorder associated with type 2 diabetes mellitus     Elevated cholesterol     Hypercholesterolemia     Hypertension     Type 2 diabetes mellitus 2017     Social History     Socioeconomic History    Marital status:    Tobacco Use    Smoking status: Former     Current packs/day: 0.00     Average packs/day: 0.3 packs/day for 7.0 years (1.8 ttl pk-yrs)     Types:  Electronic Cigarette, Cigarettes     Start date: 2000     Quit date: 2008     Years since quittin.1     Passive exposure: Never    Smokeless tobacco: Never   Vaping Use    Vaping status: Some Days   Substance and Sexual Activity    Alcohol use: Yes     Alcohol/week: 10.0 standard drinks of alcohol     Types: 10 Cans of beer per week     Comment: occ    Drug use: Never    Sexual activity: Yes     Partners: Female     Birth control/protection: I.U.D.       Problem list reviewed by Sahil Suh RPH on 2025 at 10:01 AM    Allergies  Known allergies and reactions were discussed with the patient. The patient's chart has been reviewed for allergy information and updated as necessary.   Patient has no known allergies.    Allergies reviewed by Sahil Suh RPH on 2025 at 10:02 AM    Relevant Laboratory Values  A1C Last 3 Results          2024    08:26 2025    09:09   HGBA1C Last 3 Results   Hemoglobin A1C 6.4  8.1      Lab Results   Component Value Date    HGBA1C 8.1 (A) 2025     Lab Results   Component Value Date    GLUCOSE 215 (H) 2024    CALCIUM 9.4 2024     2024    K 4.2 2024    CO2 23.3 2024     2024    BUN 18 2024    CREATININE 1.03 2024    BCR 17.5 2024    ANIONGAP 13.7 2024     Lab Results   Component Value Date    CHOL 210 (H) 2024    CHLPL 219 (H) 10/07/2022    TRIG 318 (H) 2024    HDL 43 2024     (H) 2024     Microalbumin          2024    08:42   Microalbumin   Microalbumin, Urine 1.4      Current Medication List  This medication list has been reviewed with the patient and evaluated for any interactions or necessary modifications/recommendations, and updated to include all prescription medications, OTC medications, and supplements the patient is currently taking.  This list reflects what is contained in the patient's profile, which has also been marked as  reviewed to communicate to other providers it is the most up to date version of the patient's current medication therapy.     Current Outpatient Medications:     dapagliflozin Propanediol (Farxiga) 10 MG tablet, Take 1 tablet by mouth Daily., Disp: 21 tablet, Rfl: 7    famotidine (Pepcid) 20 MG tablet, Take 1 tablet by mouth 2 (Two) Times a Day., Disp: 60 tablet, Rfl: 5    hydrOXYzine (ATARAX) 10 MG tablet, Take 1 tablet by mouth Daily As Needed for Anxiety., Disp: 60 tablet, Rfl: 0    ibuprofen (ADVIL,MOTRIN) 800 MG tablet, Take 1 tablet by mouth Every 8 (Eight) Hours As Needed for pain., Disp: 20 tablet, Rfl: 1    lisinopril (PRINIVIL,ZESTRIL) 5 MG tablet, Take 1 tablet by mouth Daily., Disp: 21 tablet, Rfl: 7    loratadine (CLARITIN) 10 MG tablet, Take 1 tablet by mouth Daily., Disp: 90 tablet, Rfl: 0    metFORMIN ER (GLUCOPHAGE-XR) 500 MG 24 hr tablet, Take 2 tablets by mouth 2 (Two) Times a Day., Disp: 84 tablet, Rfl: 7    metoprolol succinate XL (TOPROL-XL) 25 MG 24 hr tablet, Take 2 tablets by mouth Daily., Disp: 180 tablet, Rfl: 1    rosuvastatin (Crestor) 5 MG tablet, Take 1 tablet by mouth Daily., Disp: 90 tablet, Rfl: 0    sildenafil (Viagra) 50 MG tablet, Take 1/2 tablet by mouth Daily As Needed for Erectile Dysfunction., Disp: 30 tablet, Rfl: 0    aspirin 81 MG EC tablet, Take 1 tablet by mouth Daily. (Patient not taking: Reported on 2/17/2025), Disp: , Rfl:     Blood Glucose Monitoring Suppl (Contour Next One) device, Use to test glucose twice daily, Disp: 1 each, Rfl: 0    Cholecalciferol 25 MCG (1000 UT) tablet, Take 1 tablet by mouth Daily. (Patient not taking: Reported on 2/17/2025), Disp: , Rfl:     glucose blood test strip, Use to test glucose twice daily, Disp: 100 each, Rfl: 5    Lancets (onetouch ultrasoft) lancets, Use to test glucose twice daily, Disp: 100 each, Rfl: 5    magnesium oxide (MAG-OX) 400 MG tablet, Take 1 tablet by mouth Daily. (Patient not taking: Reported on 2/17/2025), Disp:  , Rfl:     Thiamine Mononitrate (B1) 100 MG tablet, Take 100 mg by mouth Daily. (Patient not taking: Reported on 2/17/2025), Disp: , Rfl:     Tirzepatide 2.5 MG/0.5ML solution auto-injector, Inject 2.5 mg under the skin into the appropriate area as directed 1 (One) Time Per Week., Disp: 2 mL, Rfl: 0    Medicines reviewed by Sahil Suh RP on 2/17/2025 at  9:21 AM    Drug Interactions with diabetic medications  Coadministration of metformin and IBU may increase the risk of acute renal failure and lactic acidosis - provider will continue to routinely monitor kidney function (he continues to report that he only uses IBU PRN)  Lisinopril may enhance the adverse/toxic effect of metformin. This includes both a risk for hypoglycemia and for lactic acidosis.  Metoprolol may mask the hypoglycemic symptoms of metformin, farxiga, and Mounjaro.  Aspirin may enhance the hypoglycemic effect of antidiabetic agents.    Adverse Drug Reactions  Adverse Reactions Experienced: None  Plan for ADR Management: Not Required    Hospitalizations and Urgent Care Since Last Assessment  Hospitalizations or Admissions: None  ED Visits: None  Urgent Office Visits: None    Education:  Mounjaro  Discussed the medication's MOA and that it helps you feel full, helps your body release more insulin, helps your body remove excess sugar from the blood, helps your body make less sugar after meals, and slows down how quickly food leaves the stomach.  Also discussed that nausea, vomiting, constipation and/or diarrhea tend to be the most common adverse effects, especially if larger meals are eaten.  Encouraged smaller meals throughout the day.  Do not start the medication if you have h/o gastroparesis, pancreatitis or thyroid cancer, and keep your early eye exams.  Inject subcutaneously into the upper arm, thigh or abdomen once weekly.  Rotate injection site each week.  Store in the refrigerator. If needed, each prefilled pen can be kept at room  temperature for up to 21 days.  Mounjaro® (Tirzepatide)     Mounjaro is currently FDA approved for the treatment of type 2 diabetes, using tirzepatide for weight loss is considered “off-label”     Medication Expectations   Why am I taking this medication? You are taking Mounjaro for weight loss because you have excess weight and also have weight-related medical problems or obesity. It should be used along with a reduced calorie diet and increased physical activity.    What should I expect while on this medication? You should expect to lose approximately 15%-20% of body weight   How does the medication work? Mounjaro is an injection that addresses one of your body's natural responses to weight loss. It works like naturally produced hormones in the body called GLP-1 and GIP that regulates appetite which can lead to eating fewer calories and losing weight. GIP complements the effects of the GLP-1 increasing energy expenditure leading to weight loss. This medication also slows down food from leaving your stomach, making you feel medina for longer.   How long will I be on this medication for? The amount of time you will be on this medication will be determined by your doctor. Do not abruptly stop this medication without talking to your doctor first.    How do I take this medication? Take as directed on your prescription label. Mounjaro is injected under the skin (subcutaneously) of your stomach, thigh, or upper arm. This medication should be taken once weekly and can be given with or without food.     For each new prefilled pen, pull off the base cap on the pen. Place the base flat on the skin, then unlock. Press and hold the button for up to 10 seconds. Listen for the first click. It means the injection has started. The second click means that the injection is complete. Remove pen from skin and discard in a sharps container.     What are some possible side effects? You may notice you don't feel as hungry, especially  when you first start using Mounjaro. Some common side effects include nausea, vomiting, diarrhea, vomiting, indigestion, constipation, and stomach (abdominal) pain. Redness, itching, and/or swelling can occur where the shot was given. You should also monitor for low blood sugar (hypoglycemia), especially if you are taking Mounjaro with other medications that cause low blood sugar. Stop using Mounjaro and call your doctor immediately if you have severe pain in your stomach area that will not go away as this could be a sign of pancreatitis (inflammation of your pancreas). Gallbladder problems have happened in some people who use Mounjaro. Tell your healthcare provider right away if you get symptoms of gallbladder problems, which may include pain in your upper stomach (abdomen), fever, yellowing of skin or eyes (jaundice), and tiara-colored stools.    What happens if I miss a dose? Missed dose should be administered as soon as possible within 4 days; resume usual schedule thereafter. If >4 days have elapsed, skip the missed dose and resume administration at the next scheduled weekly dose.          Medication Safety   What are things I should warn my doctor immediately about? Do not use Mounjaro if you or a family member have ever had medullary thyroid cancer (MTC) or Multiple Endocrine Neoplasia syndrome type 2 (MEN 2). Tell your doctor if you get a lump or swelling in your neck, hoarseness, difficulty swallowing, or feel short of breath (these may be symptoms of thyroid cancer). Talk to your doctor if you have or have had problems with your pancreas, kidneys, or liver. Tell your doctor if you have problems with digesting food or slowed emptying of your stomach (gastroparesis). Also tell your doctor if you notice any signs/symptoms of an allergic reaction (rash, hives, difficulty breathing, etc.).   What are things that I should be cautious of? Be cautious of any side effects from this medication. Talk to your doctor if  any new ones develop or aren't getting better.   What are some medications that can interact with this one? Taking Mounjaro with other medications that lower your blood sugar such as insulins and glipizide/glimepiride/glyburide may increase the risk of low blood sugar. It should not be taken with other medicines called GLP-1 receptor agonists, as these work the same way as Mounjaro. Because Mounjaro slows stomach emptying, it can affect the way some medicines work. Always tell your doctor or pharmacist immediately if you start taking any new medications, including over-the-counter medications, vitamins, and herbal supplements.      Resources/Support   How can I remind myself to take this medication? You can download reminder apps to help you manage your refills. You may also set an alarm on your phone to remind you.    Is financial support available?  [employer], the drug , can provide co-pay cards if you have commercial insurance.    Which vaccines are recommended for me? Talk to your doctor about these vaccines: Flu, Coronavirus (COVID-19), Pneumococcal (pneumonia), Tdap, Zoster (shingles).     Medication Storage/Handling   How should I handle this medication? Keep this medication out of reach of pets/children and keep the pen capped when not in use. Do not share your medicine pens with others.    How does this medication need to be stored? Store your new, unused pens in the original carton in the refrigerator. Protect it from light. Do not freeze. If needed, each single-dose pen can be stored unrefrigerated at temperatures for up to 21 days.   How should I dispose of this medication? Used Mounjaro pens should be thrown away after use. Place your used pen in an approved sharps container. If you do not have a sharps container, you may use a household container made of heavy-duty plastic with a tight-fitting lid that is leak resistant (e.g., heavy-duty plastic laundry detergent bottle). If your doctor  decides to stop this medication, take to your local police station for proper disposal. Some pharmacies also have take-back bins for medication drop-off.       Females of Childbearing Age   Is there anything additionally I should know as a female of childbearing age?  Talk to your doctor if you are pregnant, planning to become pregnant, or breastfeeding. You should not take this medication for weight loss if you are pregnant, planning to become pregnant or breastfeeding.     Tirzepatide may decrease the efficacy of oral hormonal contraception due to changes in gastric emptying; contraceptives administered by a non-oral route are not affected. Because the changes in gastric emptying are largest following the first dose, patients using an oral contraceptive should add a barrier method after starting tirzepatide treatment. Alternately, patients can be switched to non-oral contraceptive methods.           Adherence and Self-Administration  Adherence related patient's specialty therapy was discussed with the patient. The Adherence segment of this outreach has been reviewed and updated.     Ongoing or New Barriers to Patient Adherence and/or Self-Administration: None   Methods for Supporting Patient Adherence and/or Self-Administration: None Required     Goals of Therapy  Goals related to the patient's specialty therapy was discussed with the patient. The Patient Goals segment of this outreach has been reviewed and updated.    Goals Addressed Today        Consistently take medications as prescribed      Pt continues to report no missed doses of his diabetes meds.       HEMOGLOBIN A1C < 7      Pt not at goal as most recent A1c 8.1%       Specialty Pharmacy General Goal      Prevent hypoglcyemia--Pt reports no BG < 70 episodes            Reassessment Plan & Follow-Up  Patient's diabetes is not controlled with A1C of 8.1%.   Recent Provider Medication Therapy Changes: Per Nereida Paris APRN:  Continue Metformin ER 1,000  mg BID   Continue Farxiga 10 mg QD  Stop Januvia and switch to Mounjaro 2.5mg subq weekly.  Per provider pt may increase to 5mg weekly dose after one month.  Additional Plans, Therapy Recommendations, or Therapy Problems to Be Addressed:   Recommend that provider check lipid panel and CMP to assess renal function, liver enzymes (pt is on statin and had elevated ALT with last CMP on 5/20/24), and lipids (last lipid panel was abnormal on 5/20/24 and pt was started on crestor 5mg daily by PCP).  Will send updated RX's to [facility] for pickup per verbal order from provider Nereida BANRARD for Mounjaro and testing supplies (Farxiga and Metformin sent with [facility] on 2/7/25.  Pharmacist to perform regular reassessments no more than (6) months from the previous assessment.  Care Coordinator to set up future refill outreaches, coordinate prescription delivery, and escalate clinical questions to pharmacist.     Attestation  I attest the patient was actively involved in and has agreed to the above plan of care. If the prescribed therapy is at any point deemed not appropriate based on the current or future assessments, a consultation will be initiated with the patient's specialty care provider to determine the best course of action. The revised plan of therapy will be documented along with any required assessments and/or additional patient education provided.     Sahil Suh RPH  02/17/25  10:06 EST

## 2025-02-17 NOTE — PROGRESS NOTES
Chief Complaint   Patient presents with    Diabetes     F/U        David Sanchez is a 44 y.o. male had concerns including Diabetes (F/U).    T2DM.     He reports that he has been eating worse throughout the holidays and has not changed his eating habits.  He is asking about starting on GLP-1 therapy.     Diabetes:  He is checking blood sugars rarely.  Current medications for diabetes include Metformin 1,000 mg BID, Farxiga 10 mg QD, Januvia 100 mg QD.  Most recent optho exam: is upcoming  Place of optho exam: [facility]  Hypos: none  He has not been following any dietary/exercise guidelines.  He does not plan to change this at this time.    He is on Lisinopril.      The following portions of the patient's history were reviewed and updated as appropriate: allergies, current medications, past family history, past medical history, past social history, past surgical history and problem list.      Review of Systems   Constitutional:  Positive for fatigue.   Eyes: Negative.    Endocrine: Negative for polydipsia, polyphagia and polyuria.   Musculoskeletal:  Positive for arthralgias.   Psychiatric/Behavioral:  Negative for sleep disturbance.    All other systems reviewed and are negative.       Physical Exam  Vitals reviewed.   Constitutional:       Appearance: Normal appearance.   Eyes:      Extraocular Movements: Extraocular movements intact.   Cardiovascular:      Rate and Rhythm: Normal rate.   Pulmonary:      Effort: Pulmonary effort is normal.   Feet:      Comments:      Neurological:      General: No focal deficit present.      Mental Status: He is alert and oriented to person, place, and time.   Psychiatric:         Mood and Affect: Mood normal.         Behavior: Behavior normal.         Thought Content: Thought content normal.         Judgment: Judgment normal.        /91 (BP Location: Right arm, Patient Position: Sitting, Cuff Size: Adult)   Pulse 91   Wt 94.8 kg (209 lb)   SpO2 97%   BMI 26.12  kg/m²      Labs and imaging    CMP:  Lab Results   Component Value Date    Glucose 187 (A) 02/17/2025    BUN 18 06/14/2024    BUN/Creatinine Ratio 17.5 06/14/2024    Creatinine 1.03 06/14/2024    Creatinine, Urine 141.9 08/16/2024    CO2 23.3 06/14/2024    Calcium 9.4 06/14/2024    Albumin 4.8 05/20/2024    AST (SGOT) 31 05/20/2024    ALT (SGPT) 74 (H) 05/20/2024     Lipid Panel:  Lab Results   Component Value Date    CHOL 210 (H) 05/20/2024    TRIG 318 (H) 05/20/2024    HDL 43 05/20/2024    VLDL 55 (H) 05/20/2024     (H) 05/20/2024     HbA1c:  Lab Results   Component Value Date    HGBA1C 8.1 (A) 02/17/2025     Glucose:      Lab Results   Component Value Date    POCGLU 187 (A) 02/17/2025     Microalbumin:  Lab Results   Component Value Date    MALBCRERATIO 9.9 08/16/2024     TSH:  Lab Results   Component Value Date    TSH 1.760 05/20/2024       Assessment and plan  Diagnoses and all orders for this visit:    1. Type 2 diabetes mellitus with hyperglycemia, without long-term current use of insulin (Primary)  Assessment & Plan:  -Diabetes is above goal with A1c up from 6.4 to 8.1.  -Discussed dietary and exercise guidelines.  -Discussed importance of yearly eye exams.  -Discussed importance of maintaining optimal blood sugars.  -Continue metformin 1000 mg twice daily.  -Continue Farxiga 10 mg daily. Discussed the medication's MOA and that it may cause more frequent urination particularly upon starting the medication. Take one tablet in the morning with or without food. Encouraged to drink plenty of water as to not get dehydrated especially in warmer weather or with activity. Also discussed there may be an increased incidence of UTIs or yeast infections and to monitor for signs/symptoms.  -Discontinue Januvia.  -Start Mounjaro 2.5 mg weekly.  Patient has no personal history of pancreatitis, no family history of MEN syndrome or medullary thyroid cancer. Possible side effects including nausea, bloating, other GI  upset and rarely pancreatitis were discussed. He was advised to call the office with any symptoms or concerns.   -Signs and symptoms of hypoglycemia reviewed with rule 15's advised.  -Follow-up in 3 months.    Orders:  -     POC Glycosylated Hemoglobin (Hb A1C)  -     POC Glucose, Blood         Return in about 3 months (around 5/17/2025) for Follow-up appointment, A1C. The patient was instructed to contact the clinic with any interval questions or concerns.    This document has been electronically signed by AKIL Byrne  February 17, 2025 09:39 EST  Endocrinology    Please note that portions of this document were completed with a voice recognition program. Efforts were made to edit the dictations, but occasionally words are mis-transcribed.

## 2025-02-18 ENCOUNTER — TELEPHONE (OUTPATIENT)
Dept: CARDIOLOGY | Facility: CLINIC | Age: 45
End: 2025-02-18
Payer: COMMERCIAL

## 2025-02-18 ENCOUNTER — TELEPHONE (OUTPATIENT)
Dept: ENDOCRINOLOGY | Facility: CLINIC | Age: 45
End: 2025-02-18

## 2025-02-18 NOTE — TELEPHONE ENCOUNTER
[facility] received a fax that requires your attention. The document has been indexed to the patient’s chart for your review.      Reason for sending: INTERVENTION PROGRAM    Documents Description: LETTER RE:RETROSPECTIVE INTERVENTION PROGRAM    Name of Sender: [employer]    Date Indexed: 02-    Notes (if needed): IS LETTER WILL BE INDEXED UNDER EXTERNAL MED REC

## 2025-02-18 NOTE — TELEPHONE ENCOUNTER
Please call patient regarding metoprolol.  Patient states he had his A1C checked yesterday and he feels this medication is messing with his sugar.

## 2025-02-19 NOTE — TELEPHONE ENCOUNTER
Shirin Grigsby APRN Collins, Sheila D, MA  Caller: Unspecified (Yesterday,  9:39 AM)  If he is concerned he can hold his metoprolol and see how his sugars do. If his heart rate increases we can discuss other medication changes to help with HR.  Shirin      Called and given message per DINA BARNARD.

## 2025-02-24 ENCOUNTER — SPECIALTY PHARMACY (OUTPATIENT)
Dept: PHARMACY | Facility: HOSPITAL | Age: 45
End: 2025-02-24
Payer: COMMERCIAL

## 2025-02-24 NOTE — PROGRESS NOTES
Specialty Pharmacy Patient Management Program  Refill Outreach     David was contacted today regarding refills of their medication(s).    Refill Questions      Flowsheet Row Most Recent Value   Changes to allergies? No   Changes to medications? No   New conditions or infections since last clinic visit No   Unplanned office visit, urgent care, ED, or hospital admission in the last 4 weeks  No   How does patient/caregiver feel medication is working? Very good   Financial problems or insurance changes  No   If yes, describe changes in insurance or financial issues. na   Since the previous refill, were any specialty medication doses or scheduled injections missed or delayed?  No   If yes, please provide the amount na   Why were doses missed? na   Does this patient require a clinical escalation to a pharmacist? No            Delivery Questions      Flowsheet Row Most Recent Value   Delivery method  at Pharmacy   Number of medications in delivery 2   Medication(s) being filled and delivered metFORMIN HCl (GLUCOPHAGE-XR), Dapagliflozin Propanediol   Doses left of specialty medications na   Copay verified? Yes   Copay amount 32.56$   Copay form of payment Pay at pickup   Signature Required Yes                 Follow-up: 30 day(s)     Alda Arnett Pharmacy Technician  2/24/2025  14:22 EST

## 2025-03-05 DIAGNOSIS — M19.09 PRIMARY OSTEOARTHRITIS OF OTHER SITE: ICD-10-CM

## 2025-03-06 RX ORDER — IBUPROFEN 800 MG/1
800 TABLET, FILM COATED ORAL EVERY 8 HOURS PRN
Qty: 20 TABLET | Refills: 1 | OUTPATIENT
Start: 2025-03-06

## 2025-03-14 ENCOUNTER — SPECIALTY PHARMACY (OUTPATIENT)
Dept: PHARMACY | Facility: HOSPITAL | Age: 45
End: 2025-03-14
Payer: COMMERCIAL

## 2025-03-14 DIAGNOSIS — E11.9 TYPE 2 DIABETES MELLITUS WITHOUT COMPLICATION, WITHOUT LONG-TERM CURRENT USE OF INSULIN: Primary | Chronic | ICD-10-CM

## 2025-03-14 DIAGNOSIS — E11.65 TYPE 2 DIABETES MELLITUS WITH HYPERGLYCEMIA, WITHOUT LONG-TERM CURRENT USE OF INSULIN: Chronic | ICD-10-CM

## 2025-03-14 RX ORDER — TIRZEPATIDE 2.5 MG/.5ML
2.5 INJECTION, SOLUTION SUBCUTANEOUS WEEKLY
Qty: 2 ML | Refills: 4 | Status: SHIPPED | OUTPATIENT
Start: 2025-03-14 | End: 2025-03-14 | Stop reason: SDUPTHER

## 2025-03-14 RX ORDER — TIRZEPATIDE 2.5 MG/.5ML
2.5 INJECTION, SOLUTION SUBCUTANEOUS WEEKLY
Qty: 2 ML | Refills: 4 | Status: SHIPPED | OUTPATIENT
Start: 2025-03-14

## 2025-03-14 RX ORDER — LISINOPRIL 5 MG/1
5 TABLET ORAL DAILY
Qty: 30 TABLET | Refills: 0 | Status: SHIPPED | OUTPATIENT
Start: 2025-03-14

## 2025-03-14 NOTE — TELEPHONE ENCOUNTER
Specialty Pharmacy Patient Management Program  Per Protocol Prescription Order or Refill     Patient will be filling or currently fills medications at [facility] and is enrolled in the Patient Management Program.    Requested Prescriptions     Pending Prescriptions Disp Refills    Tirzepatide (Mounjaro) 2.5 MG/0.5ML solution auto-injector 2 mL 4     Sig: Inject 2.5 mg under the skin into the appropriate area as directed 1 (One) Time Per Week.     Prescription orders above were sent to the pharmacy per Collaborative Care Agreement Protocol.     Last Office Visit: 2/17/2025  Next Office Visit: 5/30/2025    Sahil Suh RPH  3/14/2025  16:02 EDT

## 2025-03-14 NOTE — PROGRESS NOTES
Specialty Pharmacy Patient Management Program  Refill Outreach     David was contacted today regarding refills of their medication(s).    Refill Questions      Flowsheet Row Most Recent Value   Changes to allergies? No   Changes to medications? No   New conditions or infections since last clinic visit No   Unplanned office visit, urgent care, ED, or hospital admission in the last 4 weeks  No   How does patient/caregiver feel medication is working? Very good   Financial problems or insurance changes  No   If yes, describe changes in insurance or financial issues. na   Since the previous refill, were any specialty medication doses or scheduled injections missed or delayed?  No   If yes, please provide the amount na   Why were doses missed? na   Does this patient require a clinical escalation to a pharmacist? No            Delivery Questions      Flowsheet Row Most Recent Value   Delivery method  at Pharmacy   Number of medications in delivery 4   Medication(s) being filled and delivered Lisinopril (PRINIVIL,ZESTRIL), Dapagliflozin Propanediol, metFORMIN HCl (GLUCOPHAGE-XR), Tirzepatide   Doses left of specialty medications na   Copay verified? Yes   Copay amount 32.56$   Copay form of payment Pay at pickup   Signature Required Yes                 Follow-up: 30 day(s)     Alda Arnett, Pharmacy Technician  3/14/2025  16:01 EDT

## 2025-03-19 NOTE — TELEPHONE ENCOUNTER
Patient ID: Lois Fernandes is a 71 year old female.     Chief Complaint   Patient presents with   • Follow-up     Post-op   Would like flu vaccine         HPI  Patient is here for multiple medical problems and medications management  Right hip surgery done 3 weeks ago, went home same day, therapist came next day  Walking with walker  No pain  Started outpatient therapy last week  Breathing is not bad  Little congested this morning  Home bp below 128            Review of Systems   All other systems reviewed and are negative.      ALLERGIES:  Patient has no known allergies.    Patient Active Problem List   Diagnosis   • COPD (chronic obstructive pulmonary disease) (CMD)   • Hypercholesterolemia   • Essential hypertension   • Osteoarthritis   • Allergic rhinitis   • COPD with acute exacerbation (CMD)   • Colonoscopy refused   • Mammogram declined   • Abnormal glucose   • Vitamin D deficiency, unspecified    • Acute bacterial bronchitis   • Generalized anxiety disorder   • Right hip pain   • Acute pain of left knee   • Other constipation   • Cough   • Generalized weakness   • Muscle cramps   • COVID-19 virus infection   • Wheezing   • Acute pain of left shoulder   • Primary osteoarthritis of right hip   • Pre-operative examination for internal medicine   • Moderate major depression (CMD)   • S/P total right hip arthroplasty   • Status post hip replacement, right   • Elevated uric acid in blood       Past Medical History:   Diagnosis Date   • COPD (chronic obstructive pulmonary disease) (CMD)     Oxygen 2L as needed   • Essential (primary) hypertension    • Generalized anxiety disorder 03/01/2022   • Moderate major depression (CMD) 08/28/2023   • Osteoarthritis 05/06/2019       Past Surgical History:   Procedure Laterality Date   • Tumor removal      tumor on right breast removal       Family History   Problem Relation Age of Onset   • Stroke Mother    • COPD Mother    • Heart disease Father        Social History  Patients wife called and said that patient is ok with going up on his Mounjaro and request we send it in. She reports that he is not having any issues with his current dose.        Tobacco Use   • Smoking status: Former     Current packs/day: 0.00     Average packs/day: 1 pack/day for 33.0 years (33.0 ttl pk-yrs)     Types: Cigarettes     Start date:      Quit date: 2006     Years since quittin.7   • Smokeless tobacco: Never   • Tobacco comments:     quit smoking about 30 years ago   Vaping Use   • Vaping Use: never used   Substance Use Topics   • Alcohol use: Yes     Comment: socially   • Drug use: Never       Immunization History   Administered Date(s) Administered   • COVID Pfizer 12Y+ (Requires Dilution) 2021, 2021   • Hep B, adult 2019   • Influenza, High Dose quadrivalent, preserve-free 2021, 10/03/2022   • Influenza, high dose seasonal, preservative-free 2019   • Influenza, quadrivalent, preserve-free 10/08/2020   • Influenza, seasonal, injectable, preservative free 10/01/2012   • Influenza, seasonal, injectable, trivalent 10/01/2013, 10/01/2014, 10/01/2015, 10/01/2016, 2017   • Pneumococcal Conjugate 13 Valent Vacc (Prevnar 13) 2018   • Pneumococcal Conjugate 20 Valent Vacc (Prevnar 20) 2023   • Pneumococcal Polysaccharide Vacc (Pneumovax 23) 2013, 2019   • Tdap 2012   Pended Date(s) Pended   • Influenza, High Dose quadrivalent, preserve-free 10/09/2023        Current Outpatient Medications   Medication Sig   • oxygen (O2) gas Inhale 2 L/min into the lungs as needed (only at bed time as needed). only at bed time as needed   • meloxicam (MOBIC) 15 MG tablet Take 1 tablet by mouth daily.   • tiZANidine (ZANAFLEX) 2 MG tablet Take 1 tablet by mouth at bedtime as needed for Muscle spasms.   • traMADol (ULTRAM) 50 MG tablet Take 1 tablet by mouth every 6 hours as needed for Pain.   • acetaminophen (TYLENOL) 500 MG tablet Take 1 tablet by mouth every 6 hours.   • aspirin (ECOTRIN) 81 MG EC tablet Take 1 tablet by mouth daily. Take daily as directed for a total of 4 weeks to prevent blood clots.   • atorvastatin  (LIPITOR) 20 MG tablet Take 1 tablet by mouth daily.   • carvedilol (COREG) 25 MG tablet Take 1 tablet by mouth in the morning and 1 tablet in the evening. Take with meals.   • furosemide (LASIX) 20 MG tablet Take 1 tablet by mouth daily.   • losartan (COZAAR) 100 MG tablet Take 1 tablet by mouth daily.   • spironolactone (ALDACTONE) 25 MG tablet Take 0.5 tablets by mouth daily.   • Ventolin  (90 Base) MCG/ACT inhaler USE 2 INHALATIONS BY MOUTH EVERY 4 HOURS AS NEEDED FOR SHORTNESS  OF BREATH OR WHEEZING   • albuterol (ACCUNEB) 0.63 MG/3ML nebulizer solution Take 3 mLs by nebulization every 6 hours as needed for Wheezing or Shortness of Breath.   • tiotropium (Spiriva Respimat) 1.25 MCG/ACT inhaler Inhale 2 puffs into the lungs daily. (Patient taking differently: Inhale 2 puffs into the lungs daily as needed.)   • fluticasone-salmeterol (AIRDUO RESPICLICK) 232-14 MCG/ACT inhaler Inhale 1 puff into the lungs in the morning and 1 puff in the evening.     No current facility-administered medications for this visit.        Vitals:    10/09/23 1139 10/09/23 1143   BP: (!) 150/83 139/86   BP Location: LUE - Left upper extremity LUE - Left upper extremity   Patient Position: Sitting Sitting   Pulse: 67    SpO2: 97%    Weight: 58.8 kg (129 lb 11.9 oz)    PainSc:  0         Physical Exam  Vitals reviewed.   Constitutional:       Appearance: Normal appearance.   Cardiovascular:      Rate and Rhythm: Normal rate and regular rhythm.      Pulses: Normal pulses.      Heart sounds: Normal heart sounds.   Pulmonary:      Effort: Pulmonary effort is normal.      Breath sounds: Normal breath sounds.   Abdominal:      General: Bowel sounds are normal.      Palpations: Abdomen is soft.   Neurological:      Mental Status: She is alert.         Assessment   Diagnoses and associated orders for this visit:  1. Essential hypertension  Assessment & Plan:  Controlled on current meds  Orders:  -     CBC with Automated Differential  -      Comprehensive Metabolic Panel  2. Need for vaccination  -     INFLUENZA QUADRIVALENT HIGH DOSE PRES FREE 0.7 ML VACC,IM  3. S/P total right hip arthroplasty  Assessment & Plan:  Continue outpatient therapy  Have follow up appt with surgeon  4. Hypercholesterolemia  Assessment & Plan:  Continue statin  Check lipids next visit  Low fat diet  Exercise    Orders:  -     CBC with Automated Differential  -     Comprehensive Metabolic Panel  5. Chronic obstructive pulmonary disease, unspecified COPD type (CMD)  Assessment & Plan:  Continue inhaler  Follow up with pulmonary  6. Elevated uric acid in blood  Assessment & Plan:  Check uric acid  Orders:  -     Uric Acid       Orders Placed This Encounter   • INFLUENZA QUADRIVALENT HIGH DOSE PRES FREE 0.7 ML VACC,IM   • CBC with Automated Differential   • Comprehensive Metabolic Panel   • CBC with Automated Differential (performable only)   • Uric Acid          Time spent obtaining,reviewing record and history, mediations lists, exam, reviewing tests,counseling and educating patient/family/caregiver,ordering medications, tests, documenting clinical information 30 minutes.    Follow up: Return in about 10 weeks (around 12/18/2023).    Discussed medication dosage, usage, goals of therapy, and side effects.    The patient indicates understanding of these issues and agrees with the plan.    Electronically signed by:  Tiffany Morrison MD

## 2025-04-08 DIAGNOSIS — M19.09 PRIMARY OSTEOARTHRITIS OF OTHER SITE: ICD-10-CM

## 2025-04-08 RX ORDER — IBUPROFEN 800 MG/1
800 TABLET, FILM COATED ORAL EVERY 8 HOURS PRN
Qty: 20 TABLET | Refills: 1 | Status: SHIPPED | OUTPATIENT
Start: 2025-04-08

## 2025-04-09 NOTE — TELEPHONE ENCOUNTER
Specialty Pharmacy Patient Management Program  Prescription Order or Refill Request     Patient will be filling or currently fills medications at [facility], and requires a prescription order/refill on the following:      Requested Prescriptions     Signed Prescriptions Disp Refills    Tirzepatide 5 MG/0.5ML solution auto-injector 2 mL 5     Sig: Inject 5 mg under the skin into the appropriate area as directed 1 (One) Time Per Week.     Authorizing Provider: SHEMAR RYAN     Ordering User: BREANNA ALFREDO     Patient called to report tolerating Mouinjaro 2.5 mg weekly and would like to increase to 0.5 mg dose. Provider was agreeable to dose increase. Prescription orders above were sent to the pharmacy per verbal order from AKIL Byrne.     Last Office Visit: 2/17/25  Next Office Visit: 5/30/25    Breanna Alfredo PharmD, LISA MOTTA  Clinical Specialty Pharmacist, Endocrinology   4/9/2025  13:52 EDT

## 2025-04-11 ENCOUNTER — SPECIALTY PHARMACY (OUTPATIENT)
Dept: PHARMACY | Facility: HOSPITAL | Age: 45
End: 2025-04-11
Payer: COMMERCIAL

## 2025-04-11 NOTE — PROGRESS NOTES
Specialty Pharmacy Patient Management Program  Refill Outreach     David was contacted today regarding refills of their medication(s).    Refill Questions      Flowsheet Row Most Recent Value   Changes to allergies? No   Changes to medications? No   New conditions or infections since last clinic visit No   Unplanned office visit, urgent care, ED, or hospital admission in the last 4 weeks  No   How does patient/caregiver feel medication is working? Very good   Financial problems or insurance changes  No   If yes, describe changes in insurance or financial issues. na   Since the previous refill, were any specialty medication doses or scheduled injections missed or delayed?  No   If yes, please provide the amount na   Why were doses missed? na   Does this patient require a clinical escalation to a pharmacist? No            Delivery Questions      Flowsheet Row Most Recent Value   Delivery method  at Pharmacy   Number of medications in delivery 2   Medication(s) being filled and delivered Tirzepatide (Mounjaro), metFORMIN HCl (GLUCOPHAGE-XR)   Doses left of specialty medications na   Copay verified? Yes   Copay amount 32.35$   Copay form of payment Pay at pickup   Signature Required Yes   Do you consent to receive electronic handouts?  Yes                 Follow-up: 30 day(s)     Alda Arnett, Pharmacy Technician  4/11/2025  11:12 EDT

## 2025-04-13 DIAGNOSIS — E11.65 TYPE 2 DIABETES MELLITUS WITH HYPERGLYCEMIA, WITHOUT LONG-TERM CURRENT USE OF INSULIN: Chronic | ICD-10-CM

## 2025-04-14 RX ORDER — LISINOPRIL 5 MG/1
5 TABLET ORAL DAILY
Qty: 30 TABLET | Refills: 0 | OUTPATIENT
Start: 2025-04-14

## 2025-04-18 ENCOUNTER — LAB (OUTPATIENT)
Dept: FAMILY MEDICINE CLINIC | Facility: CLINIC | Age: 45
End: 2025-04-18
Payer: COMMERCIAL

## 2025-04-18 ENCOUNTER — OFFICE VISIT (OUTPATIENT)
Dept: FAMILY MEDICINE CLINIC | Facility: CLINIC | Age: 45
End: 2025-04-18
Payer: COMMERCIAL

## 2025-04-18 ENCOUNTER — OFFICE VISIT (OUTPATIENT)
Dept: CARDIOLOGY | Facility: CLINIC | Age: 45
End: 2025-04-18
Payer: COMMERCIAL

## 2025-04-18 VITALS
SYSTOLIC BLOOD PRESSURE: 120 MMHG | HEART RATE: 90 BPM | HEIGHT: 75 IN | WEIGHT: 200.6 LBS | DIASTOLIC BLOOD PRESSURE: 78 MMHG | BODY MASS INDEX: 24.94 KG/M2 | TEMPERATURE: 97.8 F | OXYGEN SATURATION: 97 %

## 2025-04-18 VITALS
DIASTOLIC BLOOD PRESSURE: 84 MMHG | HEIGHT: 75 IN | WEIGHT: 200 LBS | BODY MASS INDEX: 24.87 KG/M2 | RESPIRATION RATE: 16 BRPM | SYSTOLIC BLOOD PRESSURE: 120 MMHG | HEART RATE: 104 BPM | OXYGEN SATURATION: 98 %

## 2025-04-18 DIAGNOSIS — I10 ESSENTIAL HYPERTENSION: ICD-10-CM

## 2025-04-18 DIAGNOSIS — J30.2 SEASONAL ALLERGIES: ICD-10-CM

## 2025-04-18 DIAGNOSIS — E11.65 TYPE 2 DIABETES MELLITUS WITH HYPERGLYCEMIA, WITHOUT LONG-TERM CURRENT USE OF INSULIN: ICD-10-CM

## 2025-04-18 DIAGNOSIS — E78.00 HYPERCHOLESTEROLEMIA: ICD-10-CM

## 2025-04-18 DIAGNOSIS — I49.3 ASYMPTOMATIC PVCS: ICD-10-CM

## 2025-04-18 DIAGNOSIS — E78.2 MIXED HYPERLIPIDEMIA: ICD-10-CM

## 2025-04-18 DIAGNOSIS — R00.0 TACHYCARDIA: ICD-10-CM

## 2025-04-18 DIAGNOSIS — R00.0 TACHYCARDIA: Primary | ICD-10-CM

## 2025-04-18 DIAGNOSIS — E11.9 TYPE 2 DIABETES MELLITUS WITHOUT COMPLICATION, WITHOUT LONG-TERM CURRENT USE OF INSULIN: ICD-10-CM

## 2025-04-18 DIAGNOSIS — E11.65 TYPE 2 DIABETES MELLITUS WITH HYPERGLYCEMIA, WITHOUT LONG-TERM CURRENT USE OF INSULIN: Chronic | ICD-10-CM

## 2025-04-18 DIAGNOSIS — Z91.89 MULTIPLE RISK FACTORS FOR CORONARY ARTERY DISEASE: ICD-10-CM

## 2025-04-18 DIAGNOSIS — E78.2 MIXED HYPERLIPIDEMIA: Primary | ICD-10-CM

## 2025-04-18 DIAGNOSIS — F41.1 GENERALIZED ANXIETY DISORDER: ICD-10-CM

## 2025-04-18 LAB
ALBUMIN SERPL-MCNC: 4.8 G/DL (ref 3.5–5.2)
ALBUMIN/GLOB SERPL: 1.8 G/DL
ALP SERPL-CCNC: 72 U/L (ref 39–117)
ALT SERPL W P-5'-P-CCNC: 104 U/L (ref 1–41)
ANION GAP SERPL CALCULATED.3IONS-SCNC: 13 MMOL/L (ref 5–15)
AST SERPL-CCNC: 47 U/L (ref 1–40)
BILIRUB SERPL-MCNC: 1.3 MG/DL (ref 0–1.2)
BUN SERPL-MCNC: 17 MG/DL (ref 6–20)
BUN/CREAT SERPL: 17.5 (ref 7–25)
CALCIUM SPEC-SCNC: 9.6 MG/DL (ref 8.6–10.5)
CHLORIDE SERPL-SCNC: 101 MMOL/L (ref 98–107)
CHOLEST SERPL-MCNC: 148 MG/DL (ref 0–200)
CK SERPL-CCNC: 89 U/L (ref 20–200)
CO2 SERPL-SCNC: 24 MMOL/L (ref 22–29)
CREAT SERPL-MCNC: 0.97 MG/DL (ref 0.76–1.27)
DEPRECATED RDW RBC AUTO: 37.8 FL (ref 37–54)
EGFRCR SERPLBLD CKD-EPI 2021: 98.7 ML/MIN/1.73
ERYTHROCYTE [DISTWIDTH] IN BLOOD BY AUTOMATED COUNT: 12.5 % (ref 12.3–15.4)
GLOBULIN UR ELPH-MCNC: 2.7 GM/DL
GLUCOSE SERPL-MCNC: 99 MG/DL (ref 65–99)
HBA1C MFR BLD: 6.9 % (ref 4.8–5.6)
HCT VFR BLD AUTO: 47.6 % (ref 37.5–51)
HDLC SERPL-MCNC: 39 MG/DL (ref 40–60)
HGB BLD-MCNC: 16.2 G/DL (ref 13–17.7)
LDLC SERPL CALC-MCNC: 91 MG/DL (ref 0–100)
LDLC/HDLC SERPL: 2.29 {RATIO}
MCH RBC QN AUTO: 28.8 PG (ref 26.6–33)
MCHC RBC AUTO-ENTMCNC: 34 G/DL (ref 31.5–35.7)
MCV RBC AUTO: 84.5 FL (ref 79–97)
PLATELET # BLD AUTO: 208 10*3/MM3 (ref 140–450)
PMV BLD AUTO: 11.9 FL (ref 6–12)
POTASSIUM SERPL-SCNC: 4.1 MMOL/L (ref 3.5–5.2)
PROT SERPL-MCNC: 7.5 G/DL (ref 6–8.5)
RBC # BLD AUTO: 5.63 10*6/MM3 (ref 4.14–5.8)
SODIUM SERPL-SCNC: 138 MMOL/L (ref 136–145)
TRIGL SERPL-MCNC: 98 MG/DL (ref 0–150)
TSH SERPL DL<=0.05 MIU/L-ACNC: 1.9 UIU/ML (ref 0.27–4.2)
VLDLC SERPL-MCNC: 18 MG/DL (ref 5–40)
WBC NRBC COR # BLD AUTO: 6.15 10*3/MM3 (ref 3.4–10.8)

## 2025-04-18 PROCEDURE — 82550 ASSAY OF CK (CPK): CPT | Performed by: NURSE PRACTITIONER

## 2025-04-18 PROCEDURE — 80053 COMPREHEN METABOLIC PANEL: CPT | Performed by: NURSE PRACTITIONER

## 2025-04-18 PROCEDURE — 80061 LIPID PANEL: CPT | Performed by: NURSE PRACTITIONER

## 2025-04-18 PROCEDURE — 83036 HEMOGLOBIN GLYCOSYLATED A1C: CPT | Performed by: FAMILY MEDICINE

## 2025-04-18 PROCEDURE — 99214 OFFICE O/P EST MOD 30 MIN: CPT | Performed by: NURSE PRACTITIONER

## 2025-04-18 PROCEDURE — 36415 COLL VENOUS BLD VENIPUNCTURE: CPT

## 2025-04-18 PROCEDURE — 85027 COMPLETE CBC AUTOMATED: CPT | Performed by: FAMILY MEDICINE

## 2025-04-18 PROCEDURE — 84443 ASSAY THYROID STIM HORMONE: CPT | Performed by: NURSE PRACTITIONER

## 2025-04-18 PROCEDURE — 93000 ELECTROCARDIOGRAM COMPLETE: CPT | Performed by: NURSE PRACTITIONER

## 2025-04-18 RX ORDER — HYDROXYZINE HYDROCHLORIDE 10 MG/1
10 TABLET, FILM COATED ORAL DAILY PRN
Qty: 60 TABLET | Refills: 0 | Status: SHIPPED | OUTPATIENT
Start: 2025-04-18

## 2025-04-18 RX ORDER — LISINOPRIL 5 MG/1
5 TABLET ORAL DAILY
Qty: 90 TABLET | Refills: 2 | Status: SHIPPED | OUTPATIENT
Start: 2025-04-18

## 2025-04-18 RX ORDER — ROSUVASTATIN CALCIUM 5 MG/1
5 TABLET, COATED ORAL DAILY
Qty: 90 TABLET | Refills: 2 | Status: SHIPPED | OUTPATIENT
Start: 2025-04-18

## 2025-04-18 RX ORDER — LORATADINE 10 MG/1
10 TABLET ORAL DAILY
Qty: 90 TABLET | Refills: 2 | Status: SHIPPED | OUTPATIENT
Start: 2025-04-18

## 2025-04-18 NOTE — LETTER
April 18, 2025     Mitch Reed DO  [address] [place] [address]    Patient: David Sanchez   YOB: 1980   Date of Visit: 4/18/2025       Dear Mitch Reed DO,    David Sanchez was in my office today. Below are the relevant portions of my assessment and plan of care.           If you have questions, please do not hesitate to call me. I look forward to following David along with you.         Sincerely,        AKIL Leigh        CC: No Recipients

## 2025-04-18 NOTE — Clinical Note
2025     Mitch Reed DO  [address] [place] [address]    Patient: David Sanchez   YOB: 1980   Date of Visit: 2025       Dear Mitch Reed DO    David Sanchez was in my office today. Below is a copy of my note.    If you have questions, please do not hesitate to call me. I look forward to following David along with you.         Sincerely,        AKIL Leigh        CC: AKIL Byrne    Subjective    David Sanchez is a 44 y.o. male.   Chief Complaint   Patient presents with    Follow-up     Tachycardia     History of Present Illness   History of Present Illness        Patient Active Problem List   Diagnosis    Type 2 diabetes mellitus    Hypercholesterolemia    Essential hypertension    Screening for colon cancer     Past Medical History:   Diagnosis Date    Colon polyp     Disorder associated with type 2 diabetes mellitus     Elevated cholesterol     Hypercholesterolemia     Hypertension     Type 2 diabetes mellitus      Past Surgical History:   Procedure Laterality Date    APPENDECTOMY      COLONOSCOPY N/A 2023    Procedure: COLONOSCOPY;  Surgeon: Satnam Garnett MD;  Location: [facility];  Service: Gastroenterology;  Laterality: N/A;    EYE SURGERY      LASIK         Family History   Problem Relation Age of Onset    Diabetes Father     Diabetes Maternal Grandmother      Social History     Tobacco Use    Smoking status: Former     Current packs/day: 0.00     Average packs/day: 0.3 packs/day for 7.0 years (1.8 ttl pk-yrs)     Types: Electronic Cigarette, Cigarettes     Start date: 2000     Quit date: 2008     Years since quittin.3     Passive exposure: Never    Smokeless tobacco: Never   Vaping Use    Vaping status: Some Days   Substance Use Topics    Alcohol use: Yes     Alcohol/week: 10.0 standard drinks of alcohol     Types: 10 Cans of beer per week     Comment: occ    Drug use: Never         The following portions  of the patient's history were reviewed and updated as appropriate: allergies, current medications, past family history, past medical history, past social history, past surgical history and problem list.    No Known Allergies      Current Outpatient Medications:     Blood Glucose Monitoring Suppl (Contour Next One) device, Use to test glucose twice daily, Disp: 1 each, Rfl: 0    dapagliflozin Propanediol (Farxiga) 10 MG tablet, Take 1 tablet by mouth Daily., Disp: 21 tablet, Rfl: 7    famotidine (Pepcid) 20 MG tablet, Take 1 tablet by mouth 2 (Two) Times a Day., Disp: 60 tablet, Rfl: 5    glucose blood test strip, Use to test glucose twice daily, Disp: 100 each, Rfl: 5    hydrOXYzine (ATARAX) 10 MG tablet, Take 1 tablet by mouth Daily As Needed for Anxiety., Disp: 60 tablet, Rfl: 0    ibuprofen (ADVIL,MOTRIN) 800 MG tablet, Take 1 tablet by mouth Every 8 (Eight) Hours As Needed for pain., Disp: 20 tablet, Rfl: 1    Lancets (onetouch ultrasoft) lancets, Use to test glucose twice daily, Disp: 100 each, Rfl: 5    lisinopril (PRINIVIL,ZESTRIL) 5 MG tablet, Take 1 tablet by mouth Daily., Disp: 30 tablet, Rfl: 0    loratadine (CLARITIN) 10 MG tablet, Take 1 tablet by mouth Daily., Disp: 90 tablet, Rfl: 0    magnesium oxide (MAG-OX) 400 MG tablet, Take 1 tablet by mouth Daily., Disp: , Rfl:     metFORMIN ER (GLUCOPHAGE-XR) 500 MG 24 hr tablet, Take 2 tablets by mouth 2 (Two) Times a Day., Disp: 84 tablet, Rfl: 7    metoprolol succinate XL (TOPROL-XL) 25 MG 24 hr tablet, Take 2 tablets by mouth Daily., Disp: 180 tablet, Rfl: 1    rosuvastatin (Crestor) 5 MG tablet, Take 1 tablet by mouth Daily., Disp: 90 tablet, Rfl: 0    Thiamine Mononitrate (B1) 100 MG tablet, Take 100 mg by mouth Daily., Disp: , Rfl:     Tirzepatide 5 MG/0.5ML solution auto-injector, Inject 5 mg under the skin into the appropriate area as directed 1 (One) Time Per Week., Disp: 2 mL, Rfl: 5    aspirin 81 MG EC tablet, Take 1 tablet by mouth Daily. (Patient  "not taking: Reported on 4/18/2025), Disp: , Rfl:     Cholecalciferol 25 MCG (1000 UT) tablet, Take 1 tablet by mouth Daily. (Patient not taking: Reported on 4/18/2025), Disp: , Rfl:     Review of Systems    /84 (BP Location: Right arm, Patient Position: Sitting, Cuff Size: Adult)   Pulse 104   Resp 16   Ht 190.5 cm (75\")   Wt 90.7 kg (200 lb)   SpO2 98%   BMI 25.00 kg/m²     Objective  No Known Allergies    Physical Exam    Procedures    [unfilled]  WBC   Date Value Ref Range Status   05/20/2024 5.76 3.40 - 10.80 10*3/mm3 Final     RBC   Date Value Ref Range Status   05/20/2024 5.44 4.14 - 5.80 10*6/mm3 Final     Hemoglobin   Date Value Ref Range Status   05/20/2024 16.4 13.0 - 17.7 g/dL Final     Hematocrit   Date Value Ref Range Status   05/20/2024 46.1 37.5 - 51.0 % Final     MCV   Date Value Ref Range Status   05/20/2024 84.7 79.0 - 97.0 fL Final     MCH   Date Value Ref Range Status   05/20/2024 30.1 26.6 - 33.0 pg Final     MCHC   Date Value Ref Range Status   05/20/2024 35.6 31.5 - 35.7 g/dL Final     RDW   Date Value Ref Range Status   05/20/2024 12.7 12.3 - 15.4 % Final     RDW-SD   Date Value Ref Range Status   05/20/2024 38.7 37.0 - 54.0 fl Final     MPV   Date Value Ref Range Status   05/20/2024 12.2 (H) 6.0 - 12.0 fL Final     Platelets   Date Value Ref Range Status   05/20/2024 242 140 - 450 10*3/mm3 Final   LASTLAB(GLUCOSE,NA,K,CO2,CL,ANIONGAP,CREATININE,BUN,BCR,CALCIUM,EGFRIFNONA,ALKPHOS,PROTEINTOT,ALT,AST,BILITOT,ALBUMIN,GLOB,LABIL2)@  Total Cholesterol   Date Value Ref Range Status   05/20/2024 210 (H) 0 - 200 mg/dL Final     Triglycerides   Date Value Ref Range Status   05/20/2024 318 (H) 0 - 150 mg/dL Final     HDL Cholesterol   Date Value Ref Range Status   05/20/2024 43 40 - 60 mg/dL Final     LDL Cholesterol    Date Value Ref Range Status   05/20/2024 112 (H) 0 - 100 mg/dL Final     LDL Chol Calc ([facility])   Date Value Ref Range Status   10/07/2022 146 (H) 0 - 99 mg/dL Final       No " Images in the past 120 days found..          Assessment & Plan  There are no diagnoses linked to this encounter.  No diagnosis found.  Assessment & Plan               {VINNIE [employer] Provider Statement:[id number]}

## 2025-04-18 NOTE — PROGRESS NOTES
Subjective     David Sanchez is a 44 y.o. male.   Chief Complaint   Patient presents with    Follow-up     Tachycardia     History of Present Illness   History of Present Illness  David Sanchez is a 44-year-old male who presents to clinic today for cardiology follow-up.  He is overall doing well and denies acute complaint    History of tachycardia, previously monitored via Holter, which revealed irregular beats (PACs/PVCs), and atrial tachycardia. He was initially placed on metoprolol, which he reports was effective. However, he temporarily discontinued the medication due to concerns about its impact on his blood sugar levels, during which time his heart rate increased. He has resumed and currently on a daily dose of 37.5 mg of metoprolol and reports satisfactory symptom control. He does not experience any exacerbation of palpitations, dizziness, or syncope. He does consume caffeinated beverages. History of thyroid disease or anemia.    Hyperlipidemia currently on a low dose of Crestor. He is due for repeat labs to assess the medication's efficacy. He experiences intermittent myalgias, which he attributes to the Crestor, but describes them as tolerable. He has previously shown intolerance to Lipitor.     His blood pressure is well managed with lisinopril and metoprolol, although he does not monitor it regularly. He does not experience chest pain, palpitations, or dyspnea.     Nuclear stress test conducted in 06/2024 showed no ischemia, but a decreased LVEF was noted. However, a subsequent echocardiogram revealed a normal LVEF. CT imaging did not indicate significant stenosis. He does not exhibit symptoms of heart failure such as dyspnea, orthopnea, or swelling. His BNP levels are within the normal range. He continues to take lisinopril, Farxiga, and beta blocker therapy.    Diabetes mellitus is currently managed by endocrinology and he reports recent changes in his medication regimen.    Patient Active Problem  List   Diagnosis    Type 2 diabetes mellitus    Hypercholesterolemia    Essential hypertension    Screening for colon cancer     Past Medical History:   Diagnosis Date    Colon polyp     Disorder associated with type 2 diabetes mellitus     Elevated cholesterol     Hypercholesterolemia     Hypertension     Type 2 diabetes mellitus      Past Surgical History:   Procedure Laterality Date    APPENDECTOMY      COLONOSCOPY N/A 2023    Procedure: COLONOSCOPY;  Surgeon: Satnam Garnett MD;  Location: [facility];  Service: Gastroenterology;  Laterality: N/A;    EYE SURGERY      LASIK         Family History   Problem Relation Age of Onset    Diabetes Father     Diabetes Maternal Grandmother      Social History     Tobacco Use    Smoking status: Former     Current packs/day: 0.00     Average packs/day: 0.3 packs/day for 7.0 years (1.8 ttl pk-yrs)     Types: Electronic Cigarette, Cigarettes     Start date: 2000     Quit date: 2008     Years since quittin.3     Passive exposure: Never    Smokeless tobacco: Never   Vaping Use    Vaping status: Some Days   Substance Use Topics    Alcohol use: Yes     Alcohol/week: 10.0 standard drinks of alcohol     Types: 10 Cans of beer per week     Comment: occ    Drug use: Never         The following portions of the patient's history were reviewed and updated as appropriate: allergies, current medications, past family history, past medical history, past social history, past surgical history and problem list.    No Known Allergies      Current Outpatient Medications:     Blood Glucose Monitoring Suppl (Contour Next One) device, Use to test glucose twice daily, Disp: 1 each, Rfl: 0    dapagliflozin Propanediol (Farxiga) 10 MG tablet, Take 1 tablet by mouth Daily., Disp: 21 tablet, Rfl: 7    famotidine (Pepcid) 20 MG tablet, Take 1 tablet by mouth 2 (Two) Times a Day., Disp: 60 tablet, Rfl: 5    glucose blood test strip, Use to test glucose twice daily, Disp: 100 each,  Rfl: 5    ibuprofen (ADVIL,MOTRIN) 800 MG tablet, Take 1 tablet by mouth Every 8 (Eight) Hours As Needed for pain., Disp: 20 tablet, Rfl: 1    Lancets (onetouch ultrasoft) lancets, Use to test glucose twice daily, Disp: 100 each, Rfl: 5    magnesium oxide (MAG-OX) 400 MG tablet, Take 1 tablet by mouth Daily. (Patient not taking: Reported on 4/18/2025), Disp: , Rfl:     metFORMIN ER (GLUCOPHAGE-XR) 500 MG 24 hr tablet, Take 2 tablets by mouth 2 (Two) Times a Day., Disp: 84 tablet, Rfl: 7    metoprolol succinate XL (TOPROL-XL) 25 MG 24 hr tablet, Take 2 tablets by mouth Daily., Disp: 180 tablet, Rfl: 1    Thiamine Mononitrate (B1) 100 MG tablet, Take 100 mg by mouth Daily. (Patient not taking: Reported on 4/18/2025), Disp: , Rfl:     Tirzepatide 5 MG/0.5ML solution auto-injector, Inject 5 mg under the skin into the appropriate area as directed 1 (One) Time Per Week., Disp: 2 mL, Rfl: 5    aspirin 81 MG EC tablet, Take 1 tablet by mouth Daily., Disp: , Rfl:     Cholecalciferol 25 MCG (1000 UT) tablet, Take 1 tablet by mouth Daily. (Patient not taking: Reported on 2/17/2025), Disp: , Rfl:     hydrOXYzine (ATARAX) 10 MG tablet, Take 1 tablet by mouth Daily As Needed for Anxiety., Disp: 60 tablet, Rfl: 0    lisinopril (PRINIVIL,ZESTRIL) 5 MG tablet, Take 1 tablet by mouth Daily., Disp: 90 tablet, Rfl: 2    loratadine (CLARITIN) 10 MG tablet, Take 1 tablet by mouth Daily., Disp: 90 tablet, Rfl: 2    rosuvastatin (Crestor) 5 MG tablet, Take 1 tablet by mouth Daily., Disp: 90 tablet, Rfl: 2    Review of Systems   Constitutional:  Negative for activity change, appetite change, chills, diaphoresis, fatigue and fever.   HENT:  Negative for congestion, drooling, ear discharge, ear pain, mouth sores, nosebleeds, postnasal drip, rhinorrhea, sinus pressure, sneezing and sore throat.    Eyes:  Negative for pain, discharge and visual disturbance.   Respiratory:  Negative for cough, chest tightness, shortness of breath and wheezing.   "  Cardiovascular:  Negative for chest pain, palpitations and leg swelling.   Gastrointestinal:  Negative for abdominal pain, constipation, diarrhea, nausea and vomiting.   Endocrine: Negative for cold intolerance, heat intolerance, polydipsia, polyphagia and polyuria.   Musculoskeletal:  Positive for myalgias. Negative for arthralgias and neck pain.   Skin:  Negative for rash and wound.   Neurological:  Negative for dizziness, syncope, speech difficulty, weakness, light-headedness and headaches.   Hematological:  Negative for adenopathy. Does not bruise/bleed easily.   Psychiatric/Behavioral:  Negative for confusion, dysphoric mood and sleep disturbance. The patient is not nervous/anxious.    All other systems reviewed and are negative.    /84 (BP Location: Right arm, Patient Position: Sitting, Cuff Size: Adult)   Pulse 104   Resp 16   Ht 190.5 cm (75\")   Wt 90.7 kg (200 lb)   SpO2 98%   BMI 25.00 kg/m²     Objective   No Known Allergies    Physical Exam  Vitals reviewed.   Constitutional:       Appearance: Normal appearance. He is well-developed.   HENT:      Head: Normocephalic.   Eyes:      Conjunctiva/sclera: Conjunctivae normal.   Neck:      Thyroid: No thyromegaly.      Vascular: No carotid bruit or JVD.   Cardiovascular:      Rate and Rhythm: Normal rate and regular rhythm.   Pulmonary:      Effort: Pulmonary effort is normal.      Breath sounds: Normal breath sounds.   Abdominal:      General: Bowel sounds are normal.      Palpations: Abdomen is soft. There is no hepatomegaly, splenomegaly or mass.      Tenderness: There is no abdominal tenderness.   Musculoskeletal:      Cervical back: Neck supple.      Right lower leg: No edema.      Left lower leg: No edema.   Skin:     General: Skin is warm and dry.   Neurological:      Mental Status: He is alert and oriented to person, place, and time.   Psychiatric:         Attention and Perception: Attention normal.         Mood and Affect: Mood normal.    "      Speech: Speech normal.         Behavior: Behavior normal. Behavior is cooperative.         Cognition and Memory: Cognition normal.           ECG 12 Lead    Date/Time: 4/18/2025 11:42 AM  Performed by: Shirin Grigsby APRN    Authorized by: Shirin Grigsby APRN  Comparison: compared with previous ECG   Similar to previous ECG  Rhythm: sinus tachycardia  Ectopy: unifocal PVCs and atrial premature contractions  Rate: tachycardic  BPM: 104  Other findings: left ventricular hypertrophy    Clinical impression: abnormal EKG  Comments: QT/QTc - 368/428          LABS  WBC   Date Value Ref Range Status   05/20/2024 5.76 3.40 - 10.80 10*3/mm3 Final     RBC   Date Value Ref Range Status   05/20/2024 5.44 4.14 - 5.80 10*6/mm3 Final     Hemoglobin   Date Value Ref Range Status   05/20/2024 16.4 13.0 - 17.7 g/dL Final     Hematocrit   Date Value Ref Range Status   05/20/2024 46.1 37.5 - 51.0 % Final     MCV   Date Value Ref Range Status   05/20/2024 84.7 79.0 - 97.0 fL Final     MCH   Date Value Ref Range Status   05/20/2024 30.1 26.6 - 33.0 pg Final     MCHC   Date Value Ref Range Status   05/20/2024 35.6 31.5 - 35.7 g/dL Final     RDW   Date Value Ref Range Status   05/20/2024 12.7 12.3 - 15.4 % Final     RDW-SD   Date Value Ref Range Status   05/20/2024 38.7 37.0 - 54.0 fl Final     MPV   Date Value Ref Range Status   05/20/2024 12.2 (H) 6.0 - 12.0 fL Final     Platelets   Date Value Ref Range Status   05/20/2024 242 140 - 450 10*3/mm3 Final       Total Cholesterol   Date Value Ref Range Status   05/20/2024 210 (H) 0 - 200 mg/dL Final     Triglycerides   Date Value Ref Range Status   05/20/2024 318 (H) 0 - 150 mg/dL Final     HDL Cholesterol   Date Value Ref Range Status   05/20/2024 43 40 - 60 mg/dL Final     LDL Cholesterol    Date Value Ref Range Status   05/20/2024 112 (H) 0 - 100 mg/dL Final     LDL Chol Calc (NIH)   Date Value Ref Range Status   10/07/2022 146 (H) 0 - 99 mg/dL Final       Assessment & Plan    Diagnoses and all orders for this visit:    1. Tachycardia (Primary)  -     TSH; Future  -     ECG 12 Lead  2. Asymptomatic PVCs  3. Hypercholesterolemia  -     Comprehensive Metabolic Panel; Future  -     Lipid Panel; Future  -     CK; Future  4. Essential hypertension  5. Multiple risk factors for coronary artery disease  6. Type 2 diabetes mellitus with hyperglycemia, without long-term current use of insulin      Assessment & Plan  1. Tachycardia.  He has a history of irregular beats, PACs/PVCs, and atrial tachycardia. He remains on Metoprolol   Recommend increasing to 50 mg daily and monitor VS closely     2. Hyperlipidemia.  He is currently on a low dose of Crestor and is due for repeat labs to reevaluate the efficacy of the medication. He reports intermittent myalgias from the Crestor, which are tolerable. Previous intolerance to Lipitor was noted. A drug holiday, taking the medication every other day, or decreasing the dosage to half was discussed to see if it helps with symptoms. If symptoms persist, initiation of PCSK9 inhibitors or Leqvio would be recommended.  Heart healthy diet     3. Hypertension.  His blood pressure is well controlled on lisinopril and metoprolol. He does not routinely monitor his blood pressure and does not report chest pain, palpitations, or dyspnea.    4. Diabetes mellitus.  His diabetes is currently managed by endocrinology. He reports recent medication changes.    Aggressive risk factor modification with lifestyle modifications, regular exercise, heart healthy diet and avoidance of tobacco products    Review of medical record    Labs recommended     Follow-up in 6 months with EKG, sooner if needed

## 2025-04-21 ENCOUNTER — TELEPHONE (OUTPATIENT)
Dept: CARDIOLOGY | Facility: CLINIC | Age: 45
End: 2025-04-21
Payer: COMMERCIAL

## 2025-04-21 ENCOUNTER — RESULTS FOLLOW-UP (OUTPATIENT)
Dept: FAMILY MEDICINE CLINIC | Facility: CLINIC | Age: 45
End: 2025-04-21
Payer: COMMERCIAL

## 2025-04-21 DIAGNOSIS — R74.8 ELEVATED LIVER ENZYMES: Primary | ICD-10-CM

## 2025-04-21 NOTE — TELEPHONE ENCOUNTER
Spoke with patient about results. Patient stated that he had drank about 20 beers the night before at his wife's birthday. Stated he knew Liver enzymes would be elevated. Discussed with AKIL Puentes about results. Provider advised to abstain from alcohol for the next 2 weeks and have CMP rechecked. Would discuss results at next appointment. Patient verbalized understanding of results and instructions by by provider with no further questions at this time.

## 2025-04-23 NOTE — PROGRESS NOTES
Subjective   David Sanchez is a 44 y.o. male.   Pt presents today with CC of Hypertension      History of Present Illness   History of Present Illness  Patient is a 44-year-old male here to follow-up on hypertension associated with type 2 diabetes and hyperlipidemia.  He is agreeable to blood work.  He currently takes Farxiga, lisinopril Mounjaro, Crestor, and metformin.    Hypertension  Pertinent negatives include no blurred vision, chest pain, headaches, neck pain or palpitations.     Check up  Symptoms are: new.   Onset was at an unknown time.   Pertinent negative symptoms include no abdominal pain, no anorexia, no joint pain, no change in stool, no chest pain, no chills, no congestion, no cough, no diaphoresis, no fatigue, no fever, no headaches, no joint swelling, no myalgias, no nausea, no neck pain, no numbness, no rash, no sore throat, no swollen glands, no dysuria, no vertigo, no visual change, no vomiting and no weakness.        The following portions of the patient's history were reviewed and updated as appropriate: allergies, current medications, past family history, past medical history, past social history, past surgical history, and problem list.    Review of Systems   Constitutional:  Negative for chills, diaphoresis, fatigue, fever and unexpected weight loss.   HENT:  Negative for congestion, sore throat and swollen glands.    Eyes:  Negative for blurred vision and visual disturbance.   Respiratory:  Negative for cough and wheezing.    Cardiovascular:  Negative for chest pain and palpitations.   Gastrointestinal:  Negative for abdominal pain, anorexia, diarrhea, nausea and vomiting.   Endocrine: Negative for cold intolerance and heat intolerance.   Genitourinary:  Negative for dysuria.   Musculoskeletal:  Negative for arthralgias, joint pain, myalgias, neck pain and neck stiffness.   Skin:  Negative for rash.   Neurological:  Negative for dizziness, vertigo, seizures, syncope, weakness and  numbness.   Psychiatric/Behavioral:  Negative for self-injury, suicidal ideas and depressed mood.      Vitals:    04/18/25 0933   BP: 120/78   Pulse: 90   Temp: 97.8 °F (36.6 °C)   SpO2: 97%        Objective   Physical Exam  Vitals and nursing note reviewed.   Constitutional:       Appearance: He is well-developed.   HENT:      Head: Normocephalic and atraumatic.      Right Ear: External ear normal.      Left Ear: External ear normal.      Nose: Nose normal.   Eyes:      Conjunctiva/sclera: Conjunctivae normal.      Pupils: Pupils are equal, round, and reactive to light.   Cardiovascular:      Rate and Rhythm: Normal rate and regular rhythm.      Heart sounds: Normal heart sounds.   Pulmonary:      Effort: Pulmonary effort is normal.      Breath sounds: Normal breath sounds.   Abdominal:      General: Bowel sounds are normal.      Palpations: Abdomen is soft.   Musculoskeletal:      Cervical back: Normal range of motion and neck supple.   Skin:     General: Skin is warm and dry.   Neurological:      Mental Status: He is alert and oriented to person, place, and time.   Psychiatric:         Behavior: Behavior normal.           Assessment & Plan   Diagnoses and all orders for this visit:    1. Mixed hyperlipidemia (Primary)  -     CBC No Differential; Future  -     Hemoglobin A1c; Future  -     rosuvastatin (Crestor) 5 MG tablet; Take 1 tablet by mouth Daily.  Dispense: 90 tablet; Refill: 2    2. Type 2 diabetes mellitus without complication, without long-term current use of insulin  -     CBC No Differential; Future  -     Hemoglobin A1c; Future  Follows with endocrinology.  Will get updated labs.  3. Generalized anxiety disorder  -     hydrOXYzine (ATARAX) 10 MG tablet; Take 1 tablet by mouth Daily As Needed for Anxiety.  Dispense: 60 tablet; Refill: 0    4. Type 2 diabetes mellitus with hyperglycemia, without long-term current use of insulin  -     lisinopril (PRINIVIL,ZESTRIL) 5 MG tablet; Take 1 tablet by mouth  Daily.  Dispense: 90 tablet; Refill: 2  Blood pressure well-controlled on this current regimen.  5. Seasonal allergies  -     loratadine (CLARITIN) 10 MG tablet; Take 1 tablet by mouth Daily.  Dispense: 90 tablet; Refill: 2                               This document has been electronically signed by Mitch Reed DO  April 23, 2025 11:01 EDT    Dictated Utilizing Dragon Dictation: Part of this note may be an electronic transcription/translation of spoken language to printed text using the Dragon Dictation System.

## 2025-04-30 RX ORDER — ONDANSETRON 4 MG/1
4 TABLET, FILM COATED ORAL EVERY 8 HOURS PRN
Qty: 30 TABLET | Refills: 1 | Status: SHIPPED | OUTPATIENT
Start: 2025-04-30

## 2025-05-01 ENCOUNTER — SPECIALTY PHARMACY (OUTPATIENT)
Dept: PHARMACY | Facility: HOSPITAL | Age: 45
End: 2025-05-01
Payer: COMMERCIAL

## 2025-05-01 NOTE — PROGRESS NOTES
Specialty Pharmacy Patient Management Program  Refill Outreach     David was contacted today regarding refills of their medication(s).    Refill Questions      Flowsheet Row Most Recent Value   Changes to allergies? No   Changes to medications? No   New conditions or infections since last clinic visit No   Unplanned office visit, urgent care, ED, or hospital admission in the last 4 weeks  No   How does patient/caregiver feel medication is working? Very good   Financial problems or insurance changes  No   If yes, describe changes in insurance or financial issues. na   Since the previous refill, were any specialty medication doses or scheduled injections missed or delayed?  No   If yes, please provide the amount na   Why were doses missed? na   Does this patient require a clinical escalation to a pharmacist? No            Delivery Questions      Flowsheet Row Most Recent Value   Delivery method  at Pharmacy   Number of medications in delivery 1   Medication(s) being filled and delivered Dapagliflozin Propanediol   Doses left of specialty medications na   Copay verified? Yes   Copay amount 0.00$   Copay form of payment No copayment ($0)   Signature Required Yes   Do you consent to receive electronic handouts?  Yes                 Follow-up: 30 day(s)     Alda Arnett Pharmacy Technician  5/1/2025  15:57 EDT

## 2025-05-02 ENCOUNTER — LAB (OUTPATIENT)
Dept: FAMILY MEDICINE CLINIC | Facility: CLINIC | Age: 45
End: 2025-05-02
Payer: COMMERCIAL

## 2025-05-02 DIAGNOSIS — R74.8 ELEVATED LIVER ENZYMES: ICD-10-CM

## 2025-05-02 LAB
ALBUMIN SERPL-MCNC: 4.8 G/DL (ref 3.5–5.2)
ALBUMIN/GLOB SERPL: 1.9 G/DL
ALP SERPL-CCNC: 74 U/L (ref 39–117)
ALT SERPL W P-5'-P-CCNC: 83 U/L (ref 1–41)
ANION GAP SERPL CALCULATED.3IONS-SCNC: 9.8 MMOL/L (ref 5–15)
AST SERPL-CCNC: 39 U/L (ref 1–40)
BILIRUB SERPL-MCNC: 1.1 MG/DL (ref 0–1.2)
BUN SERPL-MCNC: 13 MG/DL (ref 6–20)
BUN/CREAT SERPL: 14 (ref 7–25)
CALCIUM SPEC-SCNC: 9.8 MG/DL (ref 8.6–10.5)
CHLORIDE SERPL-SCNC: 104 MMOL/L (ref 98–107)
CO2 SERPL-SCNC: 24.2 MMOL/L (ref 22–29)
CREAT SERPL-MCNC: 0.93 MG/DL (ref 0.76–1.27)
EGFRCR SERPLBLD CKD-EPI 2021: 103.8 ML/MIN/1.73
GLOBULIN UR ELPH-MCNC: 2.5 GM/DL
GLUCOSE SERPL-MCNC: 85 MG/DL (ref 65–99)
POTASSIUM SERPL-SCNC: 4 MMOL/L (ref 3.5–5.2)
PROT SERPL-MCNC: 7.3 G/DL (ref 6–8.5)
SODIUM SERPL-SCNC: 138 MMOL/L (ref 136–145)

## 2025-05-02 PROCEDURE — 36415 COLL VENOUS BLD VENIPUNCTURE: CPT

## 2025-05-02 PROCEDURE — 80053 COMPREHEN METABOLIC PANEL: CPT | Performed by: NURSE PRACTITIONER

## 2025-05-05 DIAGNOSIS — R74.8 ELEVATED LIVER ENZYMES: Primary | ICD-10-CM

## 2025-05-06 ENCOUNTER — TELEPHONE (OUTPATIENT)
Dept: CARDIOLOGY | Facility: CLINIC | Age: 45
End: 2025-05-06
Payer: COMMERCIAL

## 2025-05-06 NOTE — TELEPHONE ENCOUNTER
Called patient to discuss message back from Shirin about statins. Patient states leg pain has been much better since off the statins. Patient's phone kept disconnecting due to poor service. [employer] message sent to patient with message from AKIL Puentes

## 2025-05-06 NOTE — TELEPHONE ENCOUNTER
Called patient with lab results - Patient asked would you like for him to stay off the cholesterol medication at this time or resume taking them.

## 2025-05-09 ENCOUNTER — SPECIALTY PHARMACY (OUTPATIENT)
Dept: PHARMACY | Facility: HOSPITAL | Age: 45
End: 2025-05-09
Payer: COMMERCIAL

## 2025-05-09 NOTE — PROGRESS NOTES
Specialty Pharmacy Patient Management Program  Refill Outreach     David was contacted today regarding refills of their medication(s).    Refill Questions      Flowsheet Row Most Recent Value   Changes to allergies? No   Changes to medications? No   New conditions or infections since last clinic visit No   Unplanned office visit, urgent care, ED, or hospital admission in the last 4 weeks  No   How does patient/caregiver feel medication is working? Very good   Financial problems or insurance changes  No   If yes, describe changes in insurance or financial issues. na   Since the previous refill, were any specialty medication doses or scheduled injections missed or delayed?  No   If yes, please provide the amount na   Why were doses missed? na   Does this patient require a clinical escalation to a pharmacist? No            Delivery Questions      Flowsheet Row Most Recent Value   Delivery method  at Pharmacy   Delivery address verified with patient/caregiver? Yes   Delivery address Prescription   Number of medications in delivery 1   Medication(s) being filled and delivered Tirzepatide   Doses left of specialty medications na   Copay verified? Yes   Copay amount 0.00$   Copay form of payment No copayment ($0)   Signature Required Yes   Do you consent to receive electronic handouts?  Yes                 Follow-up: 30 day(s)     Alda Arnett, Pharmacy Technician  5/9/2025  16:42 EDT

## 2025-05-22 ENCOUNTER — OFFICE VISIT (OUTPATIENT)
Dept: ENDOCRINOLOGY | Facility: CLINIC | Age: 45
End: 2025-05-22
Payer: COMMERCIAL

## 2025-05-22 ENCOUNTER — TELEPHONE (OUTPATIENT)
Dept: CARDIOLOGY | Facility: CLINIC | Age: 45
End: 2025-05-22

## 2025-05-22 ENCOUNTER — OFFICE VISIT (OUTPATIENT)
Dept: CARDIOLOGY | Facility: CLINIC | Age: 45
End: 2025-05-22
Payer: COMMERCIAL

## 2025-05-22 ENCOUNTER — PATIENT ROUNDING (BHMG ONLY) (OUTPATIENT)
Dept: CARDIOLOGY | Facility: CLINIC | Age: 45
End: 2025-05-22
Payer: COMMERCIAL

## 2025-05-22 VITALS
WEIGHT: 195.4 LBS | HEART RATE: 73 BPM | DIASTOLIC BLOOD PRESSURE: 97 MMHG | OXYGEN SATURATION: 96 % | HEIGHT: 75 IN | SYSTOLIC BLOOD PRESSURE: 141 MMHG | BODY MASS INDEX: 24.29 KG/M2

## 2025-05-22 VITALS
WEIGHT: 193.4 LBS | OXYGEN SATURATION: 98 % | SYSTOLIC BLOOD PRESSURE: 115 MMHG | BODY MASS INDEX: 24.17 KG/M2 | HEART RATE: 66 BPM | DIASTOLIC BLOOD PRESSURE: 80 MMHG

## 2025-05-22 DIAGNOSIS — R00.2 PALPITATIONS: Primary | ICD-10-CM

## 2025-05-22 DIAGNOSIS — R74.8 ELEVATED LIVER ENZYMES: ICD-10-CM

## 2025-05-22 DIAGNOSIS — I10 ESSENTIAL HYPERTENSION: ICD-10-CM

## 2025-05-22 DIAGNOSIS — E78.00 HYPERCHOLESTEROLEMIA: ICD-10-CM

## 2025-05-22 DIAGNOSIS — E11.65 TYPE 2 DIABETES MELLITUS WITH HYPERGLYCEMIA, WITHOUT LONG-TERM CURRENT USE OF INSULIN: Primary | ICD-10-CM

## 2025-05-22 LAB
EXPIRATION DATE: NORMAL
GLUCOSE BLDC GLUCOMTR-MCNC: 102 MG/DL (ref 70–130)
Lab: NORMAL

## 2025-05-22 PROCEDURE — 99214 OFFICE O/P EST MOD 30 MIN: CPT | Performed by: NURSE PRACTITIONER

## 2025-05-22 PROCEDURE — 82947 ASSAY GLUCOSE BLOOD QUANT: CPT | Performed by: NURSE PRACTITIONER

## 2025-05-22 PROCEDURE — 93000 ELECTROCARDIOGRAM COMPLETE: CPT | Performed by: NURSE PRACTITIONER

## 2025-05-22 NOTE — TELEPHONE ENCOUNTER
Caller: Mara Sanchez    Relationship: Emergency Contact    Best call back number: [phone]    What is the best time to reach you: ANY    Who are you requesting to speak with (clinical staff, provider,  specific staff member): CLINICAL       What was the call regarding: PATIENT IS HAVING A HIGH HEART RATE AND IS CONCERNED AND WANTS TO BE SEEN VIEWS IT AS IRREGULAR. WANTS TO BE SEEN SOONER. NO OTHER SYMPTOMS OTHER THAN OCCASIONAL IRREGULAR HEART BEAT.    Is it okay if the provider responds through [employer]: YES

## 2025-05-22 NOTE — PROGRESS NOTES
Gill, My name is Oscar. I am a CMA at [facility] [place].    I want to ask about your most recent visit.    What do you feel went well with your visit?    Do you have any recommendations on ways we may improve?    Overall, were you satisfied with your visit to our practice?    I appreciate you taking the time to answer a few questions today.     We are always looking for ways to make our patients experiences even better.   Please complete the [employer] Survey after your visits. We would love to receive feedback from you.   You may also share any suggestions or concerns by replying to this message or calling our office.      Thank you for allowing us to participate in your healthcare.  Please let me know if I can be of further assistance.    Kind regards,    Oscar

## 2025-05-22 NOTE — PROGRESS NOTES
Chief Complaint   Patient presents with    Diabetes     F/u 4/18/25 value 6.9        David Sanchez is a 44 y.o. male had concerns including Diabetes (F/u 4/18/25 value 6.9).    T2DM.     He reports that he has been eating worse throughout the holidays and has not changed his eating habits.  He has been having increased GI upset with his GLP-1 use.     Diabetes:  He is checking blood sugars rarely.  Current medications for diabetes include Metformin 1,000 mg BID, Farxiga 10 mg QD, Mounjaro 5 mg weekly.  Most recent optho exam: is upcoming  Place of optho exam: [facility]  Hypos: none  He has not been following any dietary/exercise guidelines.  He does not plan to change this at this time.    He is on Lisinopril.      The following portions of the patient's history were reviewed and updated as appropriate: allergies, current medications, past family history, past medical history, past social history, past surgical history and problem list.      Review of Systems   Constitutional:  Positive for fatigue.   Eyes: Negative.    Endocrine: Negative for polydipsia, polyphagia and polyuria.   Musculoskeletal:  Positive for arthralgias.   Psychiatric/Behavioral:  Negative for sleep disturbance.    All other systems reviewed and are negative.       Physical Exam  Vitals reviewed.   Constitutional:       Appearance: Normal appearance.   Eyes:      Extraocular Movements: Extraocular movements intact.   Cardiovascular:      Rate and Rhythm: Normal rate.   Pulmonary:      Effort: Pulmonary effort is normal.   Feet:      Comments:      Neurological:      General: No focal deficit present.      Mental Status: He is alert and oriented to person, place, and time.   Psychiatric:         Mood and Affect: Mood normal.         Behavior: Behavior normal.         Thought Content: Thought content normal.         Judgment: Judgment normal.        /80 (BP Location: Left arm, Patient Position: Sitting, Cuff Size: Adult)   Pulse 66    Wt 87.7 kg (193 lb 6.4 oz)   SpO2 98%   BMI 24.17 kg/m²      Labs and imaging    CMP:  Lab Results   Component Value Date    Glucose 102 05/22/2025    BUN 13 05/02/2025    BUN/Creatinine Ratio 14.0 05/02/2025    Creatinine 0.93 05/02/2025    Creatinine, Urine 141.9 08/16/2024    CO2 24.2 05/02/2025    Calcium 9.8 05/02/2025    Albumin 4.8 05/02/2025    AST (SGOT) 39 05/02/2025    ALT (SGPT) 83 (H) 05/02/2025     Lipid Panel:  Lab Results   Component Value Date    CHOL 148 04/18/2025    TRIG 98 04/18/2025    HDL 39 (L) 04/18/2025    VLDL 18 04/18/2025    LDL 91 04/18/2025     HbA1c:  Lab Results   Component Value Date    HGBA1C 6.90 (H) 04/18/2025     Glucose:      Lab Results   Component Value Date    POCGLU 102 05/22/2025     Microalbumin:  Lab Results   Component Value Date    MALBCRERATIO 9.9 08/16/2024     TSH:  Lab Results   Component Value Date    TSH 1.900 04/18/2025       Assessment and plan  Diagnoses and all orders for this visit:    1. Type 2 diabetes mellitus with hyperglycemia, without long-term current use of insulin (Primary)  Assessment & Plan:  -Diabetes is improving with A1c down to 6.9.  -Discussed dietary and exercise guidelines.  -Discussed importance of yearly eye exams.  -Discussed importance of maintaining optimal blood sugars.  -Continue metformin 1000 mg twice daily.  -Continue Farxiga 10 mg daily. Discussed the medication's MOA and that it may cause more frequent urination particularly upon starting the medication. Take one tablet in the morning with or without food. Encouraged to drink plenty of water as to not get dehydrated especially in warmer weather or with activity. Also discussed there may be an increased incidence of UTIs or yeast infections and to monitor for signs/symptoms.  -We discussed use of GLP-1 as this is causing him increased GI upset.  He will start limiting his food intake to help better regulate these side effects.   -Continue Mounjaro 5 mg weekly.  Patient has no  personal history of pancreatitis, no family history of MEN syndrome or medullary thyroid cancer. Possible side effects including nausea, bloating, other GI upset and rarely pancreatitis were discussed. He was advised to call the office with any symptoms or concerns.   -Signs and symptoms of hypoglycemia reviewed with rule 15's advised.  -Follow-up in 3 months.    Orders:  -     POC Glucose, Blood         Return in about 3 months (around 8/22/2025) for Follow-up appointment, A1C. The patient was instructed to contact the clinic with any interval questions or concerns.    This document has been electronically signed by AKIL Byrne  May 27, 2025 11:30 EDT  Endocrinology    Please note that portions of this document were completed with a voice recognition program. Efforts were made to edit the dictations, but occasionally words are mis-transcribed.

## 2025-05-22 NOTE — LETTER
May 28, 2025     Mitch Reed,   [address] [place] [address]    Patient: David Sanchez   YOB: 1980   Date of Visit: 5/22/2025       Dear Mitch Reed,     David Sanchez was in my office today. Below is a copy of my note.    If you have questions, please do not hesitate to call me. I look forward to following David along with you.         Sincerely,        AKIL Leigh        CC: No Recipients    Subjective     David Sanchez is a 44 y.o. male.   Chief Complaint   Patient presents with   • Chest Pain   • Palpitations      History of Present Illness   History of Present Illness  David Sanchez is a 44-year-old male who presents to the clinic today for a call in appointment.    He has a history of tachycardia, previously noted on Holter monitor as irregular beats, PACs, PVCs, and atrial tachycardia. Over the past 24 hours, he has noticed some irregularity in his heartbeat which he describes as skipping at times. He feels symptoms may be related to his stomach. He reports experiencing irregular beats last evening and this morning but does not report any dizziness or syncope. He does not report any chest pains or dyspnea. He remains on metoprolol 50 mg daily, which he generally finds effective. He did not take his medications this morning.He reports no history of thyroid disease or anemia.    He has hyperlipidemia and was previously on a low dose of Crestor, but it was recently discontinued due to elevated liver enzymes. He was also experiencing intermittent myalgias, which have improved since discontinuing the medication. He has had prior intolerance to Lipitor. He is currently having his liver enzymes monitored, which show some improvement but remain elevated. He occasionally consumes alcohol, which he believes may contribute to the elevated liver enzymes.    His blood pressure is well controlled with lisinopril and metoprolol.  However it was slightly elevated today and  he reports no routine medications this am.       Patient Active Problem List   Diagnosis   • Type 2 diabetes mellitus   • Hypercholesterolemia   • Essential hypertension   • Screening for colon cancer     Past Medical History:   Diagnosis Date   • Colon polyp    • Disorder associated with type 2 diabetes mellitus    • Elevated cholesterol    • Hypercholesterolemia    • Hypertension    • Rectal bleeding    • Type 2 diabetes mellitus      Past Surgical History:   Procedure Laterality Date   • APPENDECTOMY     • COLONOSCOPY N/A 2023    Procedure: COLONOSCOPY;  Surgeon: Satnam Garnett MD;  Location: [facility];  Service: Gastroenterology;  Laterality: N/A;   • EYE SURGERY     • LASIK       Family History   Problem Relation Age of Onset   • Diabetes Father    • Diabetes Maternal Grandmother    • Heart disease Maternal Grandmother      Social History     Tobacco Use   • Smoking status: Former     Current packs/day: 0.00     Average packs/day: 0.3 packs/day for 7.0 years (1.8 ttl pk-yrs)     Types: Electronic Cigarette, Cigarettes     Start date: 2000     Quit date: 2008     Years since quittin.4     Passive exposure: Never   • Smokeless tobacco: Never   Vaping Use   • Vaping status: Some Days   • Passive vaping exposure: Yes   Substance Use Topics   • Alcohol use: Yes     Alcohol/week: 10.0 standard drinks of alcohol     Types: 10 Cans of beer per week     Comment: occ   • Drug use: Never     The following portions of the patient's history were reviewed and updated as appropriate: allergies, current medications, past family history, past medical history, past social history, past surgical history and problem list.    No Known Allergies      Current Outpatient Medications:   •  aspirin 81 MG EC tablet, Take 1 tablet by mouth Daily., Disp: , Rfl:   •  Blood Glucose Monitoring Suppl (Contour Next One) device, Use to test glucose twice daily, Disp: 1 each, Rfl: 0  •  dapagliflozin Propanediol  (Farxiga) 10 MG tablet, Take 1 tablet by mouth Daily., Disp: 21 tablet, Rfl: 7  •  famotidine (Pepcid) 20 MG tablet, Take 1 tablet by mouth 2 (Two) Times a Day., Disp: 60 tablet, Rfl: 5  •  glucose blood test strip, Use to test glucose twice daily, Disp: 100 each, Rfl: 5  •  hydrOXYzine (ATARAX) 10 MG tablet, Take 1 tablet by mouth Daily As Needed for Anxiety., Disp: 60 tablet, Rfl: 0  •  ibuprofen (ADVIL,MOTRIN) 800 MG tablet, Take 1 tablet by mouth Every 8 (Eight) Hours As Needed for pain., Disp: 20 tablet, Rfl: 1  •  Lancets (onetouch ultrasoft) lancets, Use to test glucose twice daily, Disp: 100 each, Rfl: 5  •  lisinopril (PRINIVIL,ZESTRIL) 5 MG tablet, Take 1 tablet by mouth Daily., Disp: 90 tablet, Rfl: 2  •  loratadine (CLARITIN) 10 MG tablet, Take 1 tablet by mouth Daily., Disp: 90 tablet, Rfl: 2  •  metFORMIN ER (GLUCOPHAGE-XR) 500 MG 24 hr tablet, Take 2 tablets by mouth 2 (Two) Times a Day., Disp: 84 tablet, Rfl: 7  •  metoprolol succinate XL (TOPROL-XL) 25 MG 24 hr tablet, Take 2 tablets by mouth Daily., Disp: 180 tablet, Rfl: 1  •  ondansetron (Zofran) 4 MG tablet, Take 1 tablet by mouth Every 8 (Eight) Hours As Needed for Nausea or Vomiting., Disp: 30 tablet, Rfl: 1  •  rosuvastatin (Crestor) 5 MG tablet, Take 1 tablet by mouth Daily., Disp: 90 tablet, Rfl: 2  •  Tirzepatide 5 MG/0.5ML solution auto-injector, Inject 5 mg under the skin into the appropriate area as directed 1 (One) Time Per Week., Disp: 2 mL, Rfl: 5  •  Cholecalciferol 25 MCG (1000 UT) tablet, Take 1 tablet by mouth Daily. (Patient not taking: Reported on 2/17/2025), Disp: , Rfl:   •  magnesium oxide (MAG-OX) 400 MG tablet, Take 1 tablet by mouth Daily. (Patient not taking: Reported on 4/18/2025), Disp: , Rfl:   •  Thiamine Mononitrate (B1) 100 MG tablet, Take 100 mg by mouth Daily. (Patient not taking: Reported on 4/18/2025), Disp: , Rfl:     Review of Systems   Constitutional:  Negative for activity change, appetite change, chills,  "diaphoresis, fatigue and fever.   HENT:  Negative for congestion, drooling, ear discharge, ear pain, mouth sores, nosebleeds, postnasal drip, rhinorrhea, sinus pressure, sneezing and sore throat.    Eyes:  Negative for pain, discharge and visual disturbance.   Respiratory:  Negative for cough, chest tightness, shortness of breath and wheezing.    Cardiovascular:  Positive for palpitations. Negative for chest pain and leg swelling.   Gastrointestinal:  Negative for abdominal pain, constipation, diarrhea, nausea and vomiting.        GERD   Endocrine: Negative for cold intolerance, heat intolerance, polydipsia, polyphagia and polyuria.   Musculoskeletal:  Negative for arthralgias, myalgias and neck pain.   Skin:  Negative for rash and wound.   Neurological:  Negative for dizziness, syncope, speech difficulty, weakness, light-headedness and headaches.   Hematological:  Negative for adenopathy. Does not bruise/bleed easily.   Psychiatric/Behavioral:  Negative for confusion, dysphoric mood and sleep disturbance. The patient is not nervous/anxious.    All other systems reviewed and are negative.    /97 (BP Location: Right arm, Patient Position: Sitting, Cuff Size: Adult)   Pulse 73   Ht 190.5 cm (75\")   Wt 88.6 kg (195 lb 6.4 oz)   SpO2 96%   BMI 24.42 kg/m²     BP recheck 143/98 - pt reports no medications today     Objective   No Known Allergies    Physical Exam  Vitals reviewed.   Constitutional:       Appearance: Normal appearance. He is well-developed.   HENT:      Head: Normocephalic.   Eyes:      Conjunctiva/sclera: Conjunctivae normal.   Neck:      Thyroid: No thyromegaly.      Vascular: No carotid bruit or JVD.   Cardiovascular:      Rate and Rhythm: Normal rate and regular rhythm.   Pulmonary:      Effort: Pulmonary effort is normal.      Breath sounds: Normal breath sounds.   Musculoskeletal:      Cervical back: Neck supple.      Right lower leg: No edema.      Left lower leg: No edema.   Skin:     " General: Skin is warm and dry.   Neurological:      Mental Status: He is alert and oriented to person, place, and time.   Psychiatric:         Attention and Perception: Attention normal.         Mood and Affect: Mood normal.         Speech: Speech normal.         Behavior: Behavior normal. Behavior is cooperative.         Cognition and Memory: Cognition normal.         ECG 12 Lead    Date/Time: 5/22/2025 8:51 AM  Performed by: Shirin Grigsby APRN    Authorized by: Shirin Grigsby APRN  Comparison: compared with previous ECG   Similar to previous ECG  Rhythm: sinus rhythm  Rate: normal  BPM: 73  Other findings: left ventricular hypertrophy    Clinical impression: non-specific ECG  Comments: QT/QTc - 414/440        LABS  WBC   Date Value Ref Range Status   04/18/2025 6.15 3.40 - 10.80 10*3/mm3 Final     RBC   Date Value Ref Range Status   04/18/2025 5.63 4.14 - 5.80 10*6/mm3 Final     Hemoglobin   Date Value Ref Range Status   04/18/2025 16.2 13.0 - 17.7 g/dL Final     Hematocrit   Date Value Ref Range Status   04/18/2025 47.6 37.5 - 51.0 % Final     MCV   Date Value Ref Range Status   04/18/2025 84.5 79.0 - 97.0 fL Final     MCH   Date Value Ref Range Status   04/18/2025 28.8 26.6 - 33.0 pg Final     MCHC   Date Value Ref Range Status   04/18/2025 34.0 31.5 - 35.7 g/dL Final     RDW   Date Value Ref Range Status   04/18/2025 12.5 12.3 - 15.4 % Final     RDW-SD   Date Value Ref Range Status   04/18/2025 37.8 37.0 - 54.0 fl Final     MPV   Date Value Ref Range Status   04/18/2025 11.9 6.0 - 12.0 fL Final     Platelets   Date Value Ref Range Status   04/18/2025 208 140 - 450 10*3/mm3 Final       Total Cholesterol   Date Value Ref Range Status   04/18/2025 148 0 - 200 mg/dL Final     Triglycerides   Date Value Ref Range Status   04/18/2025 98 0 - 150 mg/dL Final     HDL Cholesterol   Date Value Ref Range Status   04/18/2025 39 (L) 40 - 60 mg/dL Final     LDL Cholesterol    Date Value Ref Range Status   04/18/2025  91 0 - 100 mg/dL Final     LDL Chol Calc ([facility])   Date Value Ref Range Status   10/07/2022 146 (H) 0 - 99 mg/dL Final           Assessment & Plan   Diagnoses and all orders for this visit:    1. Palpitations (Primary)  -     ECG 12 Lead  -     Holter Monitor - 72 Hour Up To 15 Days; Future    2. Essential hypertension    3. Hypercholesterolemia    4. Elevated liver enzymes        Assessment & Plan  1. Tachycardia:  - Continue metoprolol 50 mg daily.  - Recommend placing a Holter monitor to rule out changes in arrhythmia or worsening symptoms. He wishes to wait until after weekend to have monitor placed.   - Continue to monitor and limit stimulants and caffeine consumption.  - Heart healthy diet and exercise recommended.  - Advised to seek urgent medical attention if new or worsening symptoms occur.    2. Hyperlipidemia:  - Liver enzymes remain slightly elevated, possibly due to occasional alcohol use.  - Hold statin with recheck and further recommendations pending.  - Consider abdominal ultrasound imaging if liver enzymes remain persistently elevated.  - Plan to reinitiate statin at a lower dose or consider PCSK9 inhibitors or Zetia in the future.  - Continue to monitor liver enzymes.    3. Elevated blood pressure:  - Blood pressure elevated in the clinic today; patient did not take medications this morning.  - Encouraged compliance with medicines and close monitoring of blood pressure.    Risks, benefits, and alternatives of treatment were discussed, including the potential need for Holter monitoring to assess arrhythmia changes, the importance of limiting stimulants and caffeine, heart-healthy lifestyle modifications, and the possibility of using alternative lipid-lowering therapies such as PCSK9 inhibitors or Zetia if statins are not tolerated. The patient is agreeable to the plan of care.    Follow-up:  - Recheck liver enzymes and further recommendations pending.  - Monitor blood pressure closely.    Review of  medical record    Aggressive risk factor modification    Follow-up as scheduled

## 2025-05-23 ENCOUNTER — TELEPHONE (OUTPATIENT)
Dept: CARDIOLOGY | Facility: CLINIC | Age: 45
End: 2025-05-23
Payer: COMMERCIAL

## 2025-05-23 NOTE — TELEPHONE ENCOUNTER
Spoke with wife regarding credit card transaction from May 22, 2025 not registering.  Patient advised to look for a bill for visit to arrive from the Corporate office.

## 2025-05-26 ENCOUNTER — HOSPITAL ENCOUNTER (OUTPATIENT)
Dept: RESPIRATORY THERAPY | Facility: HOSPITAL | Age: 45
Discharge: HOME | End: 2025-05-26
Admitting: NURSE PRACTITIONER
Payer: COMMERCIAL

## 2025-05-26 DIAGNOSIS — R00.2 PALPITATIONS: ICD-10-CM

## 2025-05-26 PROCEDURE — 93246 EXT ECG>7D<15D RECORDING: CPT

## 2025-05-27 NOTE — ASSESSMENT & PLAN NOTE
-Diabetes is improving with A1c down to 6.9.  -Discussed dietary and exercise guidelines.  -Discussed importance of yearly eye exams.  -Discussed importance of maintaining optimal blood sugars.  -Continue metformin 1000 mg twice daily.  -Continue Farxiga 10 mg daily. Discussed the medication's MOA and that it may cause more frequent urination particularly upon starting the medication. Take one tablet in the morning with or without food. Encouraged to drink plenty of water as to not get dehydrated especially in warmer weather or with activity. Also discussed there may be an increased incidence of UTIs or yeast infections and to monitor for signs/symptoms.  -We discussed use of GLP-1 as this is causing him increased GI upset.  He will start limiting his food intake to help better regulate these side effects.   -Continue Mounjaro 5 mg weekly.  Patient has no personal history of pancreatitis, no family history of MEN syndrome or medullary thyroid cancer. Possible side effects including nausea, bloating, other GI upset and rarely pancreatitis were discussed. He was advised to call the office with any symptoms or concerns.   -Signs and symptoms of hypoglycemia reviewed with rule 15's advised.  -Follow-up in 3 months.

## 2025-05-27 NOTE — PROGRESS NOTES
Subjective     David Sanchez is a 44 y.o. male.   Chief Complaint   Patient presents with    Chest Pain    Palpitations      History of Present Illness   History of Present Illness  David Sanchez is a 44-year-old male who presents to the clinic today for a call in appointment.    He has a history of tachycardia, previously noted on Holter monitor as irregular beats, PACs, PVCs, and atrial tachycardia. Over the past 24 hours, he has noticed some irregularity in his heartbeat which he describes as skipping at times. He feels symptoms may be related to his stomach. He reports experiencing irregular beats last evening and this morning but does not report any dizziness or syncope. He does not report any chest pains or dyspnea. He remains on metoprolol 50 mg daily, which he generally finds effective. He did not take his medications this morning.He reports no history of thyroid disease or anemia.    He has hyperlipidemia and was previously on a low dose of Crestor, but it was recently discontinued due to elevated liver enzymes. He was also experiencing intermittent myalgias, which have improved since discontinuing the medication. He has had prior intolerance to Lipitor. He is currently having his liver enzymes monitored, which show some improvement but remain elevated. He occasionally consumes alcohol, which he believes may contribute to the elevated liver enzymes.    His blood pressure is well controlled with lisinopril and metoprolol.  However it was slightly elevated today and he reports no routine medications this am.       Patient Active Problem List   Diagnosis    Type 2 diabetes mellitus    Hypercholesterolemia    Essential hypertension    Screening for colon cancer     Past Medical History:   Diagnosis Date    Colon polyp     Disorder associated with type 2 diabetes mellitus     Elevated cholesterol     Hypercholesterolemia     Hypertension     Rectal bleeding     Type 2 diabetes mellitus 2017     Past Surgical  History:   Procedure Laterality Date    APPENDECTOMY      COLONOSCOPY N/A 2023    Procedure: COLONOSCOPY;  Surgeon: Satnam Garnett MD;  Location: [facility];  Service: Gastroenterology;  Laterality: N/A;    EYE SURGERY      LASIK       Family History   Problem Relation Age of Onset    Diabetes Father     Diabetes Maternal Grandmother     Heart disease Maternal Grandmother      Social History     Tobacco Use    Smoking status: Former     Current packs/day: 0.00     Average packs/day: 0.3 packs/day for 7.0 years (1.8 ttl pk-yrs)     Types: Electronic Cigarette, Cigarettes     Start date: 2000     Quit date: 2008     Years since quittin.4     Passive exposure: Never    Smokeless tobacco: Never   Vaping Use    Vaping status: Some Days    Passive vaping exposure: Yes   Substance Use Topics    Alcohol use: Yes     Alcohol/week: 10.0 standard drinks of alcohol     Types: 10 Cans of beer per week     Comment: occ    Drug use: Never     The following portions of the patient's history were reviewed and updated as appropriate: allergies, current medications, past family history, past medical history, past social history, past surgical history and problem list.    No Known Allergies      Current Outpatient Medications:     aspirin 81 MG EC tablet, Take 1 tablet by mouth Daily., Disp: , Rfl:     Blood Glucose Monitoring Suppl (Contour Next One) device, Use to test glucose twice daily, Disp: 1 each, Rfl: 0    dapagliflozin Propanediol (Farxiga) 10 MG tablet, Take 1 tablet by mouth Daily., Disp: 21 tablet, Rfl: 7    famotidine (Pepcid) 20 MG tablet, Take 1 tablet by mouth 2 (Two) Times a Day., Disp: 60 tablet, Rfl: 5    glucose blood test strip, Use to test glucose twice daily, Disp: 100 each, Rfl: 5    hydrOXYzine (ATARAX) 10 MG tablet, Take 1 tablet by mouth Daily As Needed for Anxiety., Disp: 60 tablet, Rfl: 0    ibuprofen (ADVIL,MOTRIN) 800 MG tablet, Take 1 tablet by mouth Every 8 (Eight) Hours As  Needed for pain., Disp: 20 tablet, Rfl: 1    Lancets (onetouch ultrasoft) lancets, Use to test glucose twice daily, Disp: 100 each, Rfl: 5    lisinopril (PRINIVIL,ZESTRIL) 5 MG tablet, Take 1 tablet by mouth Daily., Disp: 90 tablet, Rfl: 2    loratadine (CLARITIN) 10 MG tablet, Take 1 tablet by mouth Daily., Disp: 90 tablet, Rfl: 2    metFORMIN ER (GLUCOPHAGE-XR) 500 MG 24 hr tablet, Take 2 tablets by mouth 2 (Two) Times a Day., Disp: 84 tablet, Rfl: 7    metoprolol succinate XL (TOPROL-XL) 25 MG 24 hr tablet, Take 2 tablets by mouth Daily., Disp: 180 tablet, Rfl: 1    ondansetron (Zofran) 4 MG tablet, Take 1 tablet by mouth Every 8 (Eight) Hours As Needed for Nausea or Vomiting., Disp: 30 tablet, Rfl: 1    rosuvastatin (Crestor) 5 MG tablet, Take 1 tablet by mouth Daily., Disp: 90 tablet, Rfl: 2    Tirzepatide 5 MG/0.5ML solution auto-injector, Inject 5 mg under the skin into the appropriate area as directed 1 (One) Time Per Week., Disp: 2 mL, Rfl: 5    Cholecalciferol 25 MCG (1000 UT) tablet, Take 1 tablet by mouth Daily. (Patient not taking: Reported on 2/17/2025), Disp: , Rfl:     magnesium oxide (MAG-OX) 400 MG tablet, Take 1 tablet by mouth Daily. (Patient not taking: Reported on 4/18/2025), Disp: , Rfl:     Thiamine Mononitrate (B1) 100 MG tablet, Take 100 mg by mouth Daily. (Patient not taking: Reported on 4/18/2025), Disp: , Rfl:     Review of Systems   Constitutional:  Negative for activity change, appetite change, chills, diaphoresis, fatigue and fever.   HENT:  Negative for congestion, drooling, ear discharge, ear pain, mouth sores, nosebleeds, postnasal drip, rhinorrhea, sinus pressure, sneezing and sore throat.    Eyes:  Negative for pain, discharge and visual disturbance.   Respiratory:  Negative for cough, chest tightness, shortness of breath and wheezing.    Cardiovascular:  Positive for palpitations. Negative for chest pain and leg swelling.   Gastrointestinal:  Negative for abdominal pain,  "constipation, diarrhea, nausea and vomiting.        GERD   Endocrine: Negative for cold intolerance, heat intolerance, polydipsia, polyphagia and polyuria.   Musculoskeletal:  Negative for arthralgias, myalgias and neck pain.   Skin:  Negative for rash and wound.   Neurological:  Negative for dizziness, syncope, speech difficulty, weakness, light-headedness and headaches.   Hematological:  Negative for adenopathy. Does not bruise/bleed easily.   Psychiatric/Behavioral:  Negative for confusion, dysphoric mood and sleep disturbance. The patient is not nervous/anxious.    All other systems reviewed and are negative.    /97 (BP Location: Right arm, Patient Position: Sitting, Cuff Size: Adult)   Pulse 73   Ht 190.5 cm (75\")   Wt 88.6 kg (195 lb 6.4 oz)   SpO2 96%   BMI 24.42 kg/m²     BP recheck 143/98 - pt reports no medications today     Objective   No Known Allergies    Physical Exam  Vitals reviewed.   Constitutional:       Appearance: Normal appearance. He is well-developed.   HENT:      Head: Normocephalic.   Eyes:      Conjunctiva/sclera: Conjunctivae normal.   Neck:      Thyroid: No thyromegaly.      Vascular: No carotid bruit or JVD.   Cardiovascular:      Rate and Rhythm: Normal rate and regular rhythm.   Pulmonary:      Effort: Pulmonary effort is normal.      Breath sounds: Normal breath sounds.   Musculoskeletal:      Cervical back: Neck supple.      Right lower leg: No edema.      Left lower leg: No edema.   Skin:     General: Skin is warm and dry.   Neurological:      Mental Status: He is alert and oriented to person, place, and time.   Psychiatric:         Attention and Perception: Attention normal.         Mood and Affect: Mood normal.         Speech: Speech normal.         Behavior: Behavior normal. Behavior is cooperative.         Cognition and Memory: Cognition normal.         ECG 12 Lead    Date/Time: 5/22/2025 8:51 AM  Performed by: Shirin Grigsby APRN    Authorized by: Calista, " AKIL Rothman  Comparison: compared with previous ECG   Similar to previous ECG  Rhythm: sinus rhythm  Rate: normal  BPM: 73  Other findings: left ventricular hypertrophy    Clinical impression: non-specific ECG  Comments: QT/QTc - 414/440        LABS  WBC   Date Value Ref Range Status   04/18/2025 6.15 3.40 - 10.80 10*3/mm3 Final     RBC   Date Value Ref Range Status   04/18/2025 5.63 4.14 - 5.80 10*6/mm3 Final     Hemoglobin   Date Value Ref Range Status   04/18/2025 16.2 13.0 - 17.7 g/dL Final     Hematocrit   Date Value Ref Range Status   04/18/2025 47.6 37.5 - 51.0 % Final     MCV   Date Value Ref Range Status   04/18/2025 84.5 79.0 - 97.0 fL Final     MCH   Date Value Ref Range Status   04/18/2025 28.8 26.6 - 33.0 pg Final     MCHC   Date Value Ref Range Status   04/18/2025 34.0 31.5 - 35.7 g/dL Final     RDW   Date Value Ref Range Status   04/18/2025 12.5 12.3 - 15.4 % Final     RDW-SD   Date Value Ref Range Status   04/18/2025 37.8 37.0 - 54.0 fl Final     MPV   Date Value Ref Range Status   04/18/2025 11.9 6.0 - 12.0 fL Final     Platelets   Date Value Ref Range Status   04/18/2025 208 140 - 450 10*3/mm3 Final       Total Cholesterol   Date Value Ref Range Status   04/18/2025 148 0 - 200 mg/dL Final     Triglycerides   Date Value Ref Range Status   04/18/2025 98 0 - 150 mg/dL Final     HDL Cholesterol   Date Value Ref Range Status   04/18/2025 39 (L) 40 - 60 mg/dL Final     LDL Cholesterol    Date Value Ref Range Status   04/18/2025 91 0 - 100 mg/dL Final     LDL Chol Calc (NIH)   Date Value Ref Range Status   10/07/2022 146 (H) 0 - 99 mg/dL Final           Assessment & Plan   Diagnoses and all orders for this visit:    1. Palpitations (Primary)  -     ECG 12 Lead  -     Holter Monitor - 72 Hour Up To 15 Days; Future    2. Essential hypertension    3. Hypercholesterolemia    4. Elevated liver enzymes        Assessment & Plan  1. Tachycardia:  - Continue metoprolol 50 mg daily.  - Recommend placing a Holter  monitor to rule out changes in arrhythmia or worsening symptoms. He wishes to wait until after weekend to have monitor placed.   - Continue to monitor and limit stimulants and caffeine consumption.  - Heart healthy diet and exercise recommended.  - Advised to seek urgent medical attention if new or worsening symptoms occur.    2. Hyperlipidemia:  - Liver enzymes remain slightly elevated, possibly due to occasional alcohol use.  - Hold statin with recheck and further recommendations pending.  - Consider abdominal ultrasound imaging if liver enzymes remain persistently elevated.  - Plan to reinitiate statin at a lower dose or consider PCSK9 inhibitors or Zetia in the future.  - Continue to monitor liver enzymes.    3. Elevated blood pressure:  - Blood pressure elevated in the clinic today; patient did not take medications this morning.  - Encouraged compliance with medicines and close monitoring of blood pressure.    Risks, benefits, and alternatives of treatment were discussed, including the potential need for Holter monitoring to assess arrhythmia changes, the importance of limiting stimulants and caffeine, heart-healthy lifestyle modifications, and the possibility of using alternative lipid-lowering therapies such as PCSK9 inhibitors or Zetia if statins are not tolerated. The patient is agreeable to the plan of care.    Follow-up:  - Recheck liver enzymes and further recommendations pending.  - Monitor blood pressure closely.    Review of medical record    Aggressive risk factor modification    Follow-up as scheduled

## 2025-05-29 ENCOUNTER — SPECIALTY PHARMACY (OUTPATIENT)
Dept: PHARMACY | Facility: HOSPITAL | Age: 45
End: 2025-05-29
Payer: COMMERCIAL

## 2025-05-29 NOTE — PROGRESS NOTES
Specialty Pharmacy Patient Management Program  Refill Outreach     David was contacted today regarding refills of their medication(s).    Refill Questions      Flowsheet Row Most Recent Value   Changes to allergies? No   Changes to medications? No   New conditions or infections since last clinic visit No   Unplanned office visit, urgent care, ED, or hospital admission in the last 4 weeks  No   How does patient/caregiver feel medication is working? Very good   Financial problems or insurance changes  No   If yes, describe changes in insurance or financial issues. na   Since the previous refill, were any specialty medication doses or scheduled injections missed or delayed?  No   If yes, please provide the amount na   Why were doses missed? na   Does this patient require a clinical escalation to a pharmacist? No            Delivery Questions      Flowsheet Row Most Recent Value   Delivery method  at Pharmacy   Number of medications in delivery 1   Medication(s) being filled and delivered metFORMIN HCl (GLUCOPHAGE-XR)   Doses left of specialty medications na   Copay verified? Yes   Copay amount 2.56$   Copay form of payment No copayment ($0)   Signature Required Yes   Do you consent to receive electronic handouts?  Yes                 Follow-up: 21 day(s)     Alda Arnett Pharmacy Technician  5/29/2025  09:58 EDT

## 2025-06-03 ENCOUNTER — TELEPHONE (OUTPATIENT)
Dept: ENDOCRINOLOGY | Facility: CLINIC | Age: 45
End: 2025-06-03
Payer: COMMERCIAL

## 2025-06-03 NOTE — TELEPHONE ENCOUNTER
Have him stop the Mounjaro and stay off of it for about 2 weeks and then we can further evaluation once his symptoms resolve.  He can make an appt for a few weeks to adjust meds.

## 2025-06-03 NOTE — TELEPHONE ENCOUNTER
Patient having issues with heart burn that never goes away, so he has stopped the mounjaro wants to know if he needs to schedule to come in and be seen. Please advise.

## 2025-06-05 ENCOUNTER — OFFICE VISIT (OUTPATIENT)
Dept: FAMILY MEDICINE CLINIC | Facility: CLINIC | Age: 45
End: 2025-06-05
Payer: COMMERCIAL

## 2025-06-05 ENCOUNTER — TELEPHONE (OUTPATIENT)
Dept: FAMILY MEDICINE CLINIC | Facility: CLINIC | Age: 45
End: 2025-06-05

## 2025-06-05 ENCOUNTER — LAB (OUTPATIENT)
Dept: FAMILY MEDICINE CLINIC | Facility: CLINIC | Age: 45
End: 2025-06-05
Payer: COMMERCIAL

## 2025-06-05 VITALS
OXYGEN SATURATION: 95 % | HEIGHT: 75 IN | SYSTOLIC BLOOD PRESSURE: 130 MMHG | BODY MASS INDEX: 24.32 KG/M2 | WEIGHT: 195.6 LBS | TEMPERATURE: 97.8 F | HEART RATE: 112 BPM | DIASTOLIC BLOOD PRESSURE: 70 MMHG

## 2025-06-05 DIAGNOSIS — R74.8 ELEVATED LIVER ENZYMES: ICD-10-CM

## 2025-06-05 DIAGNOSIS — T88.7XXA MEDICATION SIDE EFFECT: ICD-10-CM

## 2025-06-05 DIAGNOSIS — R10.13 DYSPEPSIA: ICD-10-CM

## 2025-06-05 DIAGNOSIS — K21.9 GASTROESOPHAGEAL REFLUX DISEASE WITHOUT ESOPHAGITIS: ICD-10-CM

## 2025-06-05 DIAGNOSIS — K21.9 GASTROESOPHAGEAL REFLUX DISEASE WITHOUT ESOPHAGITIS: Primary | ICD-10-CM

## 2025-06-05 DIAGNOSIS — R10.13 EPIGASTRIC ABDOMINAL PAIN: ICD-10-CM

## 2025-06-05 DIAGNOSIS — R00.2 PALPITATIONS: ICD-10-CM

## 2025-06-05 DIAGNOSIS — E11.65 TYPE 2 DIABETES MELLITUS WITH HYPERGLYCEMIA, WITHOUT LONG-TERM CURRENT USE OF INSULIN: Primary | ICD-10-CM

## 2025-06-05 LAB
CV ZIO BASELINE AVG BPM: 95
CV ZIO BASELINE BPM HIGH: 185
CV ZIO BASELINE BPM LOW: 63

## 2025-06-05 RX ORDER — FAMOTIDINE 20 MG/1
20 TABLET, FILM COATED ORAL 2 TIMES DAILY
Qty: 180 TABLET | Refills: 0 | Status: SHIPPED | OUTPATIENT
Start: 2025-06-05 | End: 2025-06-05 | Stop reason: ALTCHOICE

## 2025-06-05 RX ORDER — OMEPRAZOLE 40 MG/1
40 CAPSULE, DELAYED RELEASE ORAL DAILY
Qty: 90 CAPSULE | Refills: 0 | Status: SHIPPED | OUTPATIENT
Start: 2025-06-05

## 2025-06-05 NOTE — PROGRESS NOTES
Subjective   David Sanchez is a 44 y.o. male.   Pt presents today with CC of Heartburn      History of Present Illness   History of Present Illness  Patient is a 44-year-old male who complains of heartburn.  He feels as though it may be associated with his Mounjaro 5 mg.  Of note, he had no problem with 2.5, and he had no problem with 5 mg for over a month.  He developed dyspepsia, bloating, and abdominal pain.  His poor appetite warranted stopping Mounjaro.  He has not taken it in about 12 or 13 days.  His blood sugars and going back up, but his dyspepsia has improved.  He has also been eating a bland diet.  He was previously taking Pepcid but has run out.  Of note, he was taking Pepcid 80 mg daily for a few days.  #2 he previously had palpitations.  They have improved, he has a Holter monitor pending.       The following portions of the patient's history were reviewed and updated as appropriate: allergies, current medications, past family history, past medical history, past social history, past surgical history, and problem list.    Review of Systems   Constitutional:  Negative for chills, fever and unexpected weight loss.   HENT:  Negative for congestion and sore throat.    Eyes:  Negative for blurred vision and visual disturbance.   Respiratory:  Negative for cough and wheezing.    Cardiovascular:  Negative for chest pain and palpitations.   Gastrointestinal:  Negative for abdominal pain and diarrhea.   Endocrine: Negative for cold intolerance and heat intolerance.   Genitourinary:  Negative for dysuria.   Musculoskeletal:  Negative for arthralgias and neck stiffness.   Neurological:  Negative for dizziness, seizures and syncope.   Psychiatric/Behavioral:  Negative for self-injury, suicidal ideas and depressed mood.      Vitals:    06/05/25 0806   BP: 130/70   Pulse: 112   Temp: 97.8 °F (36.6 °C)   SpO2: 95%        Objective   Physical Exam  Vitals and nursing note reviewed.   Constitutional:       Appearance:  He is well-developed.   HENT:      Head: Normocephalic and atraumatic.      Right Ear: External ear normal.      Left Ear: External ear normal.      Nose: Nose normal.   Eyes:      Conjunctiva/sclera: Conjunctivae normal.      Pupils: Pupils are equal, round, and reactive to light.   Cardiovascular:      Rate and Rhythm: Normal rate and regular rhythm.      Heart sounds: Normal heart sounds.   Pulmonary:      Effort: Pulmonary effort is normal.      Breath sounds: Normal breath sounds.   Abdominal:      General: Bowel sounds are normal.      Palpations: Abdomen is soft.   Musculoskeletal:      Cervical back: Normal range of motion and neck supple.   Skin:     General: Skin is warm and dry.   Neurological:      Mental Status: He is alert and oriented to person, place, and time.   Psychiatric:         Behavior: Behavior normal.           Assessment & Plan   Diagnoses and all orders for this visit:    1. Type 2 diabetes mellitus with hyperglycemia, without long-term current use of insulin (Primary)  Follows with endocrinology.  His last dose of 5 mg Mounjaro was about 12 days ago.  He reports that his epigastric upset is resolving.  He has run out of Pepcid.  He states that his blood sugars did great on the 2.5 of Mounjaro.  He is going to discuss restarting Mounjaro at the lower dose.  Of note, he was taking Januvia previously and tolerated well*  2. Epigastric abdominal pain  -     Comprehensive Metabolic Panel; Future  -     Lipase; Future  -     C-reactive protein; Future  Will restart Pepcid twice daily.  I recommend taking it consistently for couple weeks, at which time he may stop it.  If at that time his symptoms return, he may benefit from EGD.  3. Elevated liver enzymes  Previous elevated liver enzymes.  Because of baseline elevated liver enzymes, and possible side effects to medications, I recommend updated blood work.  Will rule out pancreatitis.  4. Gastroesophageal reflux disease without esophagitis  -      famotidine (Pepcid) 20 MG tablet; Take 1 tablet by mouth 2 (Two) Times a Day.  Dispense: 180 tablet; Refill: 0    5. Dyspepsia  -     Comprehensive Metabolic Panel; Future  -     Lipase; Future  -     C-reactive protein; Future  Abdominal exam was normal.  No somatic dysfunction to suggest viscerosomatic reflex  6. Palpitations  Heart rate was elevated when he came in, but was 90 on exam.  Regular rate and rhythm.  Still waiting on Holter monitor.  7. Medication side effect                  BMI is within normal parameters. No other follow-up for BMI required.          This document has been electronically signed by Mitch Reed DO  June 5, 2025 08:33 EDT    Dictated Utilizing Dragon Dictation: Part of this note may be an electronic transcription/translation of spoken language to printed text using the Dragon Dictation System.

## 2025-06-05 NOTE — TELEPHONE ENCOUNTER
We need to allow his stomach a couple weeks to resolve from the symptoms that he was having and then we can try starting back on the low dose at that time.

## 2025-06-05 NOTE — TELEPHONE ENCOUNTER
Spoke with pt, was advised to stop Mounjaro for about 2 weeks and then have labs redrawn after symptoms resolve. Reminded pt that he should still be taking metformin and farxiga. Also advised pt to check sugar 1x day when fasting and then check again with biggest meal.     Pt wants to know if he can just go back down to the Mounjaro 2.5ML because he had no symptoms and was doing good with that dose?

## 2025-06-05 NOTE — TELEPHONE ENCOUNTER
Caller: Mara Sanchez    Relationship: Emergency Contact    Best call back number: [phone]     What medication are you requesting: PRILOSEC    What are your current symptoms: HEARTBURN    How long have you been experiencing symptoms: YEARS    Have you had these symptoms before:    [x] Yes  [] No    Have you been treated for these symptoms before:   [x] Yes  [] No    If a prescription is needed, what is your preferred pharmacy and phone number: [facility]      Additional notes:

## 2025-06-05 NOTE — TELEPHONE ENCOUNTER
Please call his pharmacy and discontinue Pepcid, he may not take both Pepcid and Prilosec.  90 day supply of Prilosec sent.

## 2025-06-06 RX ORDER — METOPROLOL SUCCINATE 25 MG/1
75 TABLET, EXTENDED RELEASE ORAL DAILY
Qty: 90 TABLET | Refills: 3 | Status: SHIPPED | OUTPATIENT
Start: 2025-06-06

## 2025-06-11 ENCOUNTER — SPECIALTY PHARMACY (OUTPATIENT)
Dept: PHARMACY | Facility: HOSPITAL | Age: 45
End: 2025-06-11
Payer: COMMERCIAL

## 2025-06-11 NOTE — PROGRESS NOTES
Specialty Pharmacy Patient Management Program  Refill Outreach     David was contacted today regarding refills of their medication(s).    Refill Questions      Flowsheet Row Most Recent Value   Changes to allergies? No   Changes to medications? No   New conditions or infections since last clinic visit No   Unplanned office visit, urgent care, ED, or hospital admission in the last 4 weeks  No   How does patient/caregiver feel medication is working? Very good   Financial problems or insurance changes  No   If yes, describe changes in insurance or financial issues. na   Since the previous refill, were any specialty medication doses or scheduled injections missed or delayed?  No   If yes, please provide the amount na   Why were doses missed? na   Does this patient require a clinical escalation to a pharmacist? No            Delivery Questions      Flowsheet Row Most Recent Value   Delivery method  at Pharmacy   Delivery address verified with patient/caregiver? Yes   Delivery address Prescription   Number of medications in delivery 2   Medication(s) being filled and delivered Dapagliflozin Propanediol, Rosuvastatin Calcium (CRESTOR)   Doses left of specialty medications na   Copay verified? Yes   Copay amount 0.00$   Copay form of payment No copayment ($0)   Signature Required Yes   Do you consent to receive electronic handouts?  Yes                 Follow-up: 21 day(s)     Alda Arnett, Pharmacy Technician  6/11/2025  14:32 EDT

## 2025-06-19 ENCOUNTER — LAB (OUTPATIENT)
Dept: FAMILY MEDICINE CLINIC | Facility: CLINIC | Age: 45
End: 2025-06-19
Payer: COMMERCIAL

## 2025-06-19 DIAGNOSIS — R10.13 DYSPEPSIA: ICD-10-CM

## 2025-06-19 DIAGNOSIS — R74.8 ELEVATED LIVER ENZYMES: ICD-10-CM

## 2025-06-19 DIAGNOSIS — R10.13 EPIGASTRIC ABDOMINAL PAIN: ICD-10-CM

## 2025-06-19 PROCEDURE — 80053 COMPREHEN METABOLIC PANEL: CPT | Performed by: NURSE PRACTITIONER

## 2025-06-19 PROCEDURE — 83690 ASSAY OF LIPASE: CPT | Performed by: NURSE PRACTITIONER

## 2025-06-19 PROCEDURE — 86140 C-REACTIVE PROTEIN: CPT | Performed by: NURSE PRACTITIONER

## 2025-06-20 ENCOUNTER — RESULTS FOLLOW-UP (OUTPATIENT)
Dept: FAMILY MEDICINE CLINIC | Facility: CLINIC | Age: 45
End: 2025-06-20
Payer: COMMERCIAL

## 2025-06-20 LAB
ALBUMIN SERPL-MCNC: 4.6 G/DL (ref 3.5–5.2)
ALBUMIN/GLOB SERPL: 1.7 G/DL
ALP SERPL-CCNC: 76 U/L (ref 39–117)
ALT SERPL W P-5'-P-CCNC: 69 U/L (ref 1–41)
ANION GAP SERPL CALCULATED.3IONS-SCNC: 16 MMOL/L (ref 5–15)
AST SERPL-CCNC: 34 U/L (ref 1–40)
BILIRUB SERPL-MCNC: 0.9 MG/DL (ref 0–1.2)
BUN SERPL-MCNC: 14 MG/DL (ref 6–20)
BUN/CREAT SERPL: 14.9 (ref 7–25)
CALCIUM SPEC-SCNC: 9.7 MG/DL (ref 8.6–10.5)
CHLORIDE SERPL-SCNC: 102 MMOL/L (ref 98–107)
CO2 SERPL-SCNC: 25 MMOL/L (ref 22–29)
CREAT SERPL-MCNC: 0.94 MG/DL (ref 0.76–1.27)
CRP SERPL-MCNC: <0.3 MG/DL (ref 0–0.5)
EGFRCR SERPLBLD CKD-EPI 2021: 102.5 ML/MIN/1.73
GLOBULIN UR ELPH-MCNC: 2.7 GM/DL
GLUCOSE SERPL-MCNC: 142 MG/DL (ref 65–99)
LIPASE SERPL-CCNC: 56 U/L (ref 13–60)
POTASSIUM SERPL-SCNC: 3.7 MMOL/L (ref 3.5–5.2)
PROT SERPL-MCNC: 7.3 G/DL (ref 6–8.5)
SODIUM SERPL-SCNC: 143 MMOL/L (ref 136–145)

## 2025-06-20 NOTE — PROGRESS NOTES
No problems on your blood work.  I recommend discussion of your diabetes treatment with endocrinology.

## 2025-06-24 DIAGNOSIS — R74.8 ELEVATED LIVER ENZYMES: Primary | ICD-10-CM

## 2025-06-30 ENCOUNTER — SPECIALTY PHARMACY (OUTPATIENT)
Dept: PHARMACY | Facility: HOSPITAL | Age: 45
End: 2025-06-30
Payer: COMMERCIAL

## 2025-06-30 NOTE — PROGRESS NOTES
Specialty Pharmacy Patient Management Program  Refill Outreach     David was contacted today regarding refills of their medication(s).    Refill Questions      Flowsheet Row Most Recent Value   Changes to allergies? No   Changes to medications? No   New conditions or infections since last clinic visit No   Unplanned office visit, urgent care, ED, or hospital admission in the last 4 weeks  No   How does patient/caregiver feel medication is working? Very good   Financial problems or insurance changes  No   Since the previous refill, were any specialty medication doses or scheduled injections missed or delayed?  No   Does this patient require a clinical escalation to a pharmacist? No            Delivery Questions      Flowsheet Row Most Recent Value   Delivery method  at Pharmacy   Number of medications in delivery 4   Medication(s) being filled and delivered Tirzepatide, Dapagliflozin Propanediol, metFORMIN HCl (GLUCOPHAGE-XR), Rosuvastatin Calcium (CRESTOR)   Doses left of specialty medications na   Copay verified? Yes   Copay amount 0.00$   Copay form of payment No copayment ($0)   Signature Required Yes   Do you consent to receive electronic handouts?  Yes                 Follow-up: 21 day(s)     Alda Arnett Pharmacy Technician  6/30/2025  10:01 EDT

## 2025-07-06 DIAGNOSIS — M19.09 PRIMARY OSTEOARTHRITIS OF OTHER SITE: ICD-10-CM

## 2025-07-07 RX ORDER — IBUPROFEN 800 MG/1
800 TABLET, FILM COATED ORAL EVERY 8 HOURS PRN
Qty: 60 TABLET | Refills: 0 | Status: SHIPPED | OUTPATIENT
Start: 2025-07-07

## 2025-07-08 ENCOUNTER — TELEPHONE (OUTPATIENT)
Dept: CARDIOLOGY | Facility: CLINIC | Age: 45
End: 2025-07-08
Payer: COMMERCIAL

## 2025-07-08 NOTE — TELEPHONE ENCOUNTER
Caller: Mara Sanchez    Relationship: Emergency Contact    Best call back number: [phone]     What is the best time to reach you: ANY     Who are you requesting to speak with (clinical staff, provider,  specific staff member): CLINICAL     What was the call regarding: PT WAS HAVING ALOT OF IRREGULAR HEART BEAT/ DISCOMFORT/ GAS A FEW MONTHS AGO. PT HAD A HOLTER, PROVIDER WANTED HIM TO DO THIS AGAIN. PT HAS STOPPED TAKING mounjaro STATES SYMPTOMS HAVE RESOLVED.   PT DOES NOT THINK HOLTER IS NEEDED, WONDERING WHEN PT NEEDS TO F/U FROM HERE.     Is it okay if the provider responds through MyChart: EITHER

## 2025-07-10 NOTE — TELEPHONE ENCOUNTER
Spoke with pts wife, adv of Yael message: If he is feeling better and doesn't wish to repeat holter monitor that is his choice. We can always repeat a monitor or a shorter one if new or worsening symptoms develop. I recommend to keep follow up as scheduled unless he has further concerns that require sooner attention.   ThanksShirin v/u and will relay message to patient.

## 2025-07-16 ENCOUNTER — SPECIALTY PHARMACY (OUTPATIENT)
Dept: PHARMACY | Facility: HOSPITAL | Age: 45
End: 2025-07-16
Payer: COMMERCIAL

## 2025-07-16 DIAGNOSIS — E11.9 TYPE 2 DIABETES MELLITUS WITHOUT COMPLICATION, WITHOUT LONG-TERM CURRENT USE OF INSULIN: Primary | Chronic | ICD-10-CM

## 2025-07-16 NOTE — PROGRESS NOTES
Specialty Pharmacy Patient Management Program  Endocrinology Reassessment     David Sanchez is a 44 y.o. male with Type 2 Diabetes seen by an Endocrinology Provider and enrolled in the Endocrinology Patient Management program offered by [facility]. A follow-up was conducted, including assessment of continued therapy appropriateness, medication adherence, and side effect incidence and management for Metformin, Januvia, and Farxiga.     Patient is taking metformin ER 1,000mg po BID and Farxiga 10 mg daily to control BG. His wife reports that his PCP discontinued his Mounjaro as he was experiencing GI intolerance with therapy.  His PCP instructed him to resume therapy with Januvia and he was able to tolerate well before. He has been taking since and denies any issues with therapy. He needs refills on Januvia today.      The pt denies any personal or family history of thyroid cancer, denies any issues with pancreatitis, and denies any recurrent UTI's or yeast infections.    Changes to Insurance Coverage or Financial Support  None     Relevant Past Medical History and Comorbidities  Relevant medical history and concomitant health conditions were discussed with the patient. The patient's chart has been reviewed for relevant past medical history and comorbid health conditions and updated as necessary.   Past Medical History:   Diagnosis Date    Colon polyp     Disorder associated with type 2 diabetes mellitus     Elevated cholesterol     Hypercholesterolemia     Hypertension     Rectal bleeding     Type 2 diabetes mellitus 2017     Social History     Socioeconomic History    Marital status:    Tobacco Use    Smoking status: Former     Current packs/day: 0.00     Average packs/day: 0.3 packs/day for 7.0 years (1.8 ttl pk-yrs)     Types: Electronic Cigarette, Cigarettes     Start date: 2000     Quit date: 2008     Years since quittin.5     Passive exposure: Never    Smokeless  tobacco: Never   Vaping Use    Vaping status: Some Days    Passive vaping exposure: Yes   Substance and Sexual Activity    Alcohol use: Yes     Alcohol/week: 10.0 standard drinks of alcohol     Types: 10 Cans of beer per week     Comment: occ    Drug use: Never    Sexual activity: Yes     Partners: Female     Birth control/protection: I.U.D.       Problem list reviewed by Breanna Ramirez PharmD on 7/16/2025 at 10:52 AM    Allergies  Known allergies and reactions were discussed with the patient. The patient's chart has been reviewed for allergy information and updated as necessary.   Patient has no known allergies.    Allergies reviewed by Breanna Ramirez PharmD on 7/16/2025 at 10:52 AM    Relevant Laboratory Values  A1C Last 3 Results          8/16/2024    08:26 2/17/2025    09:09 4/18/2025    10:24   HGBA1C Last 3 Results   Hemoglobin A1C 6.4  8.1  6.90      Lab Results   Component Value Date    HGBA1C 6.90 (H) 04/18/2025     Lab Results   Component Value Date    GLUCOSE 142 (H) 06/19/2025    CALCIUM 9.7 06/19/2025     06/19/2025    K 3.7 06/19/2025    CO2 25.0 06/19/2025     06/19/2025    BUN 14.0 06/19/2025    CREATININE 0.94 06/19/2025    BCR 14.9 06/19/2025    ANIONGAP 16.0 (H) 06/19/2025     Lab Results   Component Value Date    CHOL 148 04/18/2025    CHLPL 219 (H) 10/07/2022    TRIG 98 04/18/2025    HDL 39 (L) 04/18/2025    LDL 91 04/18/2025     Microalbumin          8/16/2024    08:42   Microalbumin   Microalbumin, Urine 1.4      Current Medication List  This medication list has been reviewed with the patient and evaluated for any interactions or necessary modifications/recommendations, and updated to include all prescription medications, OTC medications, and supplements the patient is currently taking.  This list reflects what is contained in the patient's profile, which has also been marked as reviewed to communicate to other providers it is the most up to date version of the patient's current  medication therapy.     Current Outpatient Medications:     aspirin 81 MG EC tablet, Take 1 tablet by mouth Daily., Disp: , Rfl:     Blood Glucose Monitoring Suppl (Contour Next One) device, Use to test glucose twice daily, Disp: 1 each, Rfl: 0    Cholecalciferol 25 MCG (1000 UT) tablet, Take 1 tablet by mouth Daily., Disp: , Rfl:     dapagliflozin Propanediol (Farxiga) 10 MG tablet, Take 1 tablet by mouth Daily., Disp: 21 tablet, Rfl: 7    glucose blood test strip, Use to test glucose twice daily, Disp: 100 each, Rfl: 5    hydrOXYzine (ATARAX) 10 MG tablet, Take 1 tablet by mouth Daily As Needed for Anxiety., Disp: 60 tablet, Rfl: 0    ibuprofen (ADVIL,MOTRIN) 800 MG tablet, Take 1 tablet by mouth Every 8 (Eight) Hours As Needed for pain., Disp: 60 tablet, Rfl: 0    Lancets (onetouch ultrasoft) lancets, Use to test glucose twice daily, Disp: 100 each, Rfl: 5    lisinopril (PRINIVIL,ZESTRIL) 5 MG tablet, Take 1 tablet by mouth Daily., Disp: 90 tablet, Rfl: 2    loratadine (CLARITIN) 10 MG tablet, Take 1 tablet by mouth Daily., Disp: 90 tablet, Rfl: 2    magnesium oxide (MAG-OX) 400 MG tablet, Take 1 tablet by mouth Daily., Disp: , Rfl:     metFORMIN ER (GLUCOPHAGE-XR) 500 MG 24 hr tablet, Take 2 tablets by mouth 2 (Two) Times a Day., Disp: 84 tablet, Rfl: 7    metoprolol succinate XL (TOPROL-XL) 25 MG 24 hr tablet, Take 3 tablets by mouth Daily., Disp: 90 tablet, Rfl: 3    omeprazole (priLOSEC) 40 MG capsule, Take 1 capsule by mouth Daily., Disp: 90 capsule, Rfl: 0    ondansetron (Zofran) 4 MG tablet, Take 1 tablet by mouth Every 8 (Eight) Hours As Needed for Nausea or Vomiting., Disp: 30 tablet, Rfl: 1    rosuvastatin (Crestor) 5 MG tablet, Take 1 tablet by mouth Daily., Disp: 90 tablet, Rfl: 2    SITagliptin (Januvia) 100 MG tablet, Take 1 tablet by mouth Daily., Disp: 90 tablet, Rfl: 1    Thiamine Mononitrate (B1) 100 MG tablet, Take 100 mg by mouth Daily. (Patient not taking: Reported on 6/5/2025), Disp: , Rfl:      Medicines reviewed by Breanna Ramirez, PharmD on 7/16/2025 at 10:52 AM    Drug Interactions with diabetic medications  Coadministration of metformin and IBU may increase the risk of acute renal failure and lactic acidosis - provider will continue to routinely monitor kidney function (he continues to report that he only uses IBU PRN)  Lisinopril may enhance the adverse/toxic effect of metformin. This includes both a risk for hypoglycemia and for lactic acidosis.  Metoprolol may mask the hypoglycemic symptoms of metformin, farxiga, and Mounjaro.  Aspirin may enhance the hypoglycemic effect of antidiabetic agents.    Adverse Drug Reactions  Adverse Reactions Experienced: None  Plan for ADR Management: Not Required    Hospitalizations and Urgent Care Since Last Assessment  Hospitalizations or Admissions: None  ED Visits: None  Urgent Office Visits: None    Adherence and Self-Administration  Adherence related patient's specialty therapy was discussed with the patient. The Adherence segment of this outreach has been reviewed and updated.     Ongoing or New Barriers to Patient Adherence and/or Self-Administration: None   Methods for Supporting Patient Adherence and/or Self-Administration: None Required     Goals of Therapy  Goals related to the patient's specialty therapy was discussed with the patient. The Patient Goals segment of this outreach has been reviewed and updated.    Goals Addressed Today        Consistently take medications as prescribed      Pt continues to report no missed doses of his diabetes meds.       HEMOGLOBIN A1C < 7      Specialty Pharmacy General Goal      A1C <7%: Pt at goal with most recent A1C of 6.9%.            Reassessment Plan & Follow-Up  Patient's diabetes is now at goal with most recent A1C of 6.9%. Discussed reported side effects with provider.   Recent Provider Medication Therapy Changes: Per verbal order from Nereida Paris APRN:  Continue Metformin ER 1,000 mg BID   Continue  Farxiga 10 mg QD  Stop Mounjaro and switch back to Januvia 100 mg QD.  Additional Plans, Therapy Recommendations, or Therapy Problems to Be Addressed:   Will send updated RX's to [facility] for pickup per verbal order from provider Nereida BARNARD  Pharmacist to perform regular reassessments no more than (6) months from the previous assessment.  Care Coordinator to set up future refill outreaches, coordinate prescription delivery, and escalate clinical questions to pharmacist.     Attestation  I attest the patient was actively involved in and has agreed to the above plan of care. If the prescribed therapy is at any point deemed not appropriate based on the current or future assessments, a consultation will be initiated with the patient's specialty care provider to determine the best course of action. The revised plan of therapy will be documented along with any required assessments and/or additional patient education provided.     Breanna Ramirez, PharmD, LISA MOTTA  Clinical Specialty Pharmacist, Endocrinology   07/16/25  10:53 EDT

## 2025-07-25 ENCOUNTER — SPECIALTY PHARMACY (OUTPATIENT)
Dept: PHARMACY | Facility: HOSPITAL | Age: 45
End: 2025-07-25
Payer: COMMERCIAL

## 2025-07-25 NOTE — PROGRESS NOTES
Specialty Pharmacy Patient Management Program  Refill Outreach     David was contacted today regarding refills of their medication(s).    Refill Questions      Flowsheet Row Most Recent Value   Changes to allergies? No   Changes to medications? No   New conditions or infections since last clinic visit No   Unplanned office visit, urgent care, ED, or hospital admission in the last 4 weeks  No   How does patient/caregiver feel medication is working? Very good   Financial problems or insurance changes  No   Since the previous refill, were any specialty medication doses or scheduled injections missed or delayed?  No   Does this patient require a clinical escalation to a pharmacist? No            Delivery Questions      Flowsheet Row Most Recent Value   Delivery method  at Pharmacy   Number of medications in delivery 2   Medication(s) being filled and delivered Dapagliflozin Propanediol, metFORMIN HCl (GLUCOPHAGE-XR)   Doses left of specialty medications na   Copay verified? Yes   Copay amount 0.00$   Copay form of payment No copayment ($0)   Signature Required Yes   Do you consent to receive electronic handouts?  Yes                 Follow-up: 30 day(s)     Alda Arnett, Pharmacy Technician  7/25/2025  16:02 EDT

## 2025-08-03 DIAGNOSIS — F41.1 GENERALIZED ANXIETY DISORDER: ICD-10-CM

## 2025-08-04 RX ORDER — HYDROXYZINE HYDROCHLORIDE 10 MG/1
10 TABLET, FILM COATED ORAL DAILY PRN
Qty: 60 TABLET | Refills: 0 | Status: SHIPPED | OUTPATIENT
Start: 2025-08-04

## 2025-08-22 DIAGNOSIS — E11.65 TYPE 2 DIABETES MELLITUS WITH HYPERGLYCEMIA, WITHOUT LONG-TERM CURRENT USE OF INSULIN: Chronic | ICD-10-CM

## 2025-08-27 RX ORDER — DAPAGLIFLOZIN 10 MG/1
10 TABLET, FILM COATED ORAL DAILY
Qty: 21 TABLET | Refills: 7 | Status: SHIPPED | OUTPATIENT
Start: 2025-08-27

## 2025-08-28 ENCOUNTER — SPECIALTY PHARMACY (OUTPATIENT)
Dept: PHARMACY | Facility: HOSPITAL | Age: 45
End: 2025-08-28
Payer: COMMERCIAL

## (undated) DEVICE — Device: Brand: DEFENDO AIR/WATER/SUCTION AND BIOPSY VALVE

## (undated) DEVICE — Device

## (undated) DEVICE — ENDOGATOR AUXILIARY WATER JET CONNECTOR: Brand: ENDOGATOR

## (undated) DEVICE — SINGLE PORT MANIFOLD: Brand: NEPTUNE 2

## (undated) DEVICE — TUBING, SUCTION, 1/4" X 20', STRAIGHT: Brand: MEDLINE INDUSTRIES, INC.

## (undated) DEVICE — CONN Y IRR DISP 1P/U